# Patient Record
Sex: FEMALE | Race: WHITE | NOT HISPANIC OR LATINO | Employment: OTHER | URBAN - METROPOLITAN AREA
[De-identification: names, ages, dates, MRNs, and addresses within clinical notes are randomized per-mention and may not be internally consistent; named-entity substitution may affect disease eponyms.]

---

## 2018-02-05 ENCOUNTER — HOSPITAL ENCOUNTER (OUTPATIENT)
Dept: MAMMOGRAPHY | Facility: CLINIC | Age: 68
Discharge: HOME | End: 2018-02-05
Attending: INTERNAL MEDICINE | Admitting: INTERNAL MEDICINE
Payer: MEDICARE

## 2018-02-05 DIAGNOSIS — Z12.31 VISIT FOR SCREENING MAMMOGRAM: ICD-10-CM

## 2018-02-05 PROCEDURE — 77067 SCR MAMMO BI INCL CAD: CPT

## 2018-04-30 NOTE — PROGRESS NOTES
SUBJECTIVE:   Paloma Fitch is a 68 year old female who presents for Preventive Visit.    The patient feels well.  She has not had any asthma issues.  She needs a follow-up colonoscopy.    She has had a lot of stress recently with her 's health issues.  In the last week or so she has had a very faint discomfort in the left breast area.  No central discomfort or shortness of breath or coughs.  No fevers.  She can work out and has done so without difficulty.    The patient's blood pressure is high as noted.  However, she checks it regularly at home and it is very good then.               Past Medical History:      Past Medical History:   Diagnosis Date     Colonic polyp 2012    adenomatous, fu 5 years     Gross hematuria 2016    ct and cysto via Dr. 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               Review of Systems:   The 10 point Review of Systems is negative other than noted in the HPI           Physical Exam:   Blood pressure (!) 156/92,  "pulse 100, temperature 98.1  F (36.7  C), temperature source Oral, height 5' 8\" (1.727 m), weight 153 lb (69.4 kg), SpO2 99 %, not currently breastfeeding.    Exam:  Constitutional: healthy appearing, alert and in no distress  Heent: Normocephalic. Head without obvious masses or lesions. PERRLDC, EOMI. Mouth exam within normal limits: tongue, mucous membranes, posterior pharynx all normal, no lesions or abnormalities seen.  Tm's and canals within normal limits bilaterally. Neck supple, no nuchal rigidity or masses. No supraclavicular, or cervical adenopathy. Thyroid symmetric, no masses.  Cardiovascular: Regular rate and rhythm, no murmer, rub or gallops.  JVP not elevated, no edema.  Carotids within normal limits bilaterally, no bruits.  Respiratory: Normal respiratory effort.  Lungs clear, normal flow, no wheezing or crackles.  Breasts: Normal bilaterally.  No masses or lesions.  Nipples within normal limits.  No axillary lesions or nodes.  My M.A. Was present during this part of the examination.  Gastrointestinal: Normal active bowel sounds.   Soft, not tender, no masses, guarding or rebound.  No hepatosplenomegaly.   Musculoskeletal: extremities normal, no gross deformities noted.  Skin: no suspicious lesions or rashes   Neurologic: Mental status within normal limits.  Speech fluent.  No gross motor abnormalities and gait intact.  Psychiatric: mentation appears normal and affect normal.         Data:   Labs sent        Assessment:   1. Normal complete physical exam  2. Colon polyp, follow up ordered  3. Asthma, no issues  4. Left breast discomfort, suspect msk, doubt cv, tumor, pulmonary embolis.  If worsens or not gone soon to call  5. hcm         Plan:   shingrx at pharmacy  Colonoscopy  Letter with labs  Exercise  Call if problems      Jonn Downing M.D.              Are you in the first 12 months of your Medicare Part B coverage?  No    Healthy Habits:    Do you get at least three servings of calcium " containing foods daily (dairy, green leafy vegetables, etc.)? yes    Amount of exercise or daily activities, outside of work: 6 day(s) per week    Problems taking medications regularly No    Medication side effects: No    Have you had an eye exam in the past two years? yes    Do you see a dentist twice per year? yes    Do you have sleep apnea, excessive snoring or daytime drowsiness?no      Ability to successfully perform activities of daily living: Yes, no assistance needed    Home safety:  none identified     Hearing impairment: No    Fall risk:           COGNITIVE SCREEN  1) Repeat 3 items (Banana, Sunrise, Chair)    2) Clock draw:   3) 3 item recall:   Results: 3 items recalled: COGNITIVE IMPAIRMENT LESS LIKELY    Mini-CogTM Copyright S Mely. Licensed by the author for use in [facility]; reprinted with permission ([email or web address]). All rights reserved.                Reviewed and updated as needed this visit by clinical staff         Reviewed and updated as needed this visit by Provider        Social History   Substance Use Topics     Smoking status: Never Smoker     Smokeless tobacco: Never Used     Alcohol use 0.0 oz/week     0 Standard drinks or equivalent per week      Comment: occ       If you drink alcohol do you typically have >3 drinks per day or >7 drinks per week? No                        Today's PHQ-2 Score:   PHQ-2 ( 1999 Pfizer) 10/13/2016 2/11/2013   Q1: Little interest or pleasure in doing things 0 0   Q2: Feeling down, depressed or hopeless 0 0   PHQ-2 Score 0 0       Do you feel safe in your environment - Yes    Do you have a Health Care Directive?: Yes: Patient states has Advance Directive and will bring in a copy to clinic.    Current providers sharing in care for this patient include:   Patient Care Team:  Jonn Downing MD as PCP - General (Internal Medicine)    The following health maintenance items are reviewed in Epic and correct as of today:  Health Maintenance  "  Topic Date Due     HEPATITIS C SCREENING  01/26/1967     LIPID SCREEN Q5 YR FEMALE (SYSTEM ASSIGNED)  01/26/1994     ADVANCE DIRECTIVE PLANNING Q5 YRS  01/26/2004     FALL RISK ASSESSMENT  01/26/2014     DEXA SCAN SCREENING (SYSTEM ASSIGNED)  01/26/2014     PNEUMOCOCCAL (1 of 2 - PCV13) 01/26/2014     ASTHMA ACTION PLAN Q1 YR  02/11/2014     ASTHMA CONTROL TEST Q6 MOS  04/13/2017     INFLUENZA VACCINE (1) 09/01/2017     TETANUS IMMUNIZATION (SYSTEM ASSIGNED)  11/01/2017     MAMMO SCREEN Q2 YR (SYSTEM ASSIGNED)  02/05/2020     COLON CANCER SCREEN (SYSTEM ASSIGNED)  11/29/2022       End of Life Planning:  Patient currently has an advanced directive: no, I rec it    COUNSELING:  Reviewed preventive health counseling, as reflected in patient instructions       Regular exercise       Healthy diet/nutrition        Estimated body mass index is 24.12 kg/(m^2) as calculated from the following:    Height as of 10/14/16: 5' 8\" (1.727 m).    Weight as of 10/14/16: 158 lb 9.6 oz (71.9 kg).       reports that she has never smoked. She has never used smokeless tobacco.      Appropriate preventive services were discussed with this patient, including applicable screening as appropriate for cardiovascular disease, diabetes, osteopenia/osteoporosis, and glaucoma.  As appropriate for age/gender, discussed screening for colorectal cancer, prostate cancer, breast cancer, and cervical cancer. Checklist reviewing preventive services available has been given to the patient.    Reviewed patients plan of care and provided an AVS. The Basic Care Plan (routine screening as documented in Health Maintenance) for Paloma meets the Care Plan requirement. This Care Plan has been established and reviewed with the Patient.    Counseling Resources:  ATP IV Guidelines  Pooled Cohorts Equation Calculator  Breast Cancer Risk Calculator  FRAX Risk Assessment  ICSI Preventive Guidelines  Dietary Guidelines for Americans, 2010  USDA's MyPlate  ASA " Prophylaxis  Lung CA Screening    Jonn Downing MD  [facility]

## 2018-05-01 ENCOUNTER — OFFICE VISIT (OUTPATIENT)
Dept: FAMILY MEDICINE | Facility: CLINIC | Age: 68
End: 2018-05-01
Payer: COMMERCIAL

## 2018-05-01 VITALS
HEART RATE: 100 BPM | BODY MASS INDEX: 23.19 KG/M2 | DIASTOLIC BLOOD PRESSURE: 92 MMHG | HEIGHT: 68 IN | TEMPERATURE: 98.1 F | OXYGEN SATURATION: 99 % | SYSTOLIC BLOOD PRESSURE: 156 MMHG | WEIGHT: 153 LBS

## 2018-05-01 DIAGNOSIS — K63.5 POLYP OF COLON, UNSPECIFIED PART OF COLON, UNSPECIFIED TYPE: ICD-10-CM

## 2018-05-01 DIAGNOSIS — J45.20 INTERMITTENT ASTHMA, UNCOMPLICATED: ICD-10-CM

## 2018-05-01 DIAGNOSIS — Z00.00 ROUTINE GENERAL MEDICAL EXAMINATION AT A HEALTH CARE FACILITY: Primary | ICD-10-CM

## 2018-05-01 LAB
ALBUMIN SERPL-MCNC: 3.8 G/DL (ref 3.4–5)
ALP SERPL-CCNC: 47 U/L (ref 40–150)
ALT SERPL W P-5'-P-CCNC: 22 U/L (ref 0–50)
ANION GAP SERPL CALCULATED.3IONS-SCNC: 8 MMOL/L (ref 3–14)
AST SERPL W P-5'-P-CCNC: 17 U/L (ref 0–45)
BILIRUB SERPL-MCNC: 0.7 MG/DL (ref 0.2–1.3)
BUN SERPL-MCNC: 14 MG/DL (ref 7–30)
CALCIUM SERPL-MCNC: 9 MG/DL (ref 8.5–10.1)
CHLORIDE SERPL-SCNC: 104 MMOL/L (ref 94–109)
CHOLEST SERPL-MCNC: 230 MG/DL
CO2 SERPL-SCNC: 27 MMOL/L (ref 20–32)
CREAT SERPL-MCNC: 0.86 MG/DL (ref 0.52–1.04)
ERYTHROCYTE [DISTWIDTH] IN BLOOD BY AUTOMATED COUNT: 13.3 % (ref 10–15)
GFR SERPL CREATININE-BSD FRML MDRD: 65 ML/MIN/1.7M2
GLUCOSE SERPL-MCNC: 98 MG/DL (ref 70–99)
HCT VFR BLD AUTO: 41.2 % (ref 35–47)
HCV AB SERPL QL IA: NONREACTIVE
HDLC SERPL-MCNC: 93 MG/DL
HGB BLD-MCNC: 13.6 G/DL (ref 11.7–15.7)
LDLC SERPL CALC-MCNC: 126 MG/DL
MCH RBC QN AUTO: 31 PG (ref 26.5–33)
MCHC RBC AUTO-ENTMCNC: 33 G/DL (ref 31.5–36.5)
MCV RBC AUTO: 94 FL (ref 78–100)
NONHDLC SERPL-MCNC: 137 MG/DL
PLATELET # BLD AUTO: 340 10E9/L (ref 150–450)
POTASSIUM SERPL-SCNC: 4.2 MMOL/L (ref 3.4–5.3)
PROT SERPL-MCNC: 7.7 G/DL (ref 6.8–8.8)
RBC # BLD AUTO: 4.39 10E12/L (ref 3.8–5.2)
SODIUM SERPL-SCNC: 139 MMOL/L (ref 133–144)
TRIGL SERPL-MCNC: 57 MG/DL
WBC # BLD AUTO: 7.1 10E9/L (ref 4–11)

## 2018-05-01 PROCEDURE — 86803 HEPATITIS C AB TEST: CPT | Performed by: INTERNAL MEDICINE

## 2018-05-01 PROCEDURE — 80053 COMPREHEN METABOLIC PANEL: CPT | Performed by: INTERNAL MEDICINE

## 2018-05-01 PROCEDURE — G0438 PPPS, INITIAL VISIT: HCPCS | Performed by: INTERNAL MEDICINE

## 2018-05-01 PROCEDURE — 36415 COLL VENOUS BLD VENIPUNCTURE: CPT | Performed by: INTERNAL MEDICINE

## 2018-05-01 PROCEDURE — 85027 COMPLETE CBC AUTOMATED: CPT | Performed by: INTERNAL MEDICINE

## 2018-05-01 PROCEDURE — 80061 LIPID PANEL: CPT | Performed by: INTERNAL MEDICINE

## 2018-05-01 NOTE — MR AVS SNAPSHOT
After Visit Summary   5/1/2018    Paloma Fitch    MRN: [id number]           Patient Information     Date Of Birth          1950        Visit Information        Provider Department      5/1/2018 10:00 AM Jonn Downing MD [facility]        Today's Diagnoses     Routine general medical examination at a health care facility    -  1    Polyp of colon, unspecified part of colon, unspecified type        Intermittent asthma, uncomplicated          Care Instructions      Preventive Health Recommendations    Female Ages 65 +    Yearly exam:     See your health care provider every year in order to  o Review health changes.   o Discuss preventive care.    o Review your medicines if your doctor has prescribed any.      You no longer need a yearly Pap test unless you've had an abnormal Pap test in the past 10 years. If you have vaginal symptoms, such as bleeding or discharge, be sure to talk with your provider about a Pap test.      Every 1 to 2 years, have a mammogram.  If you are over 69, talk with your health care provider about whether or not you want to continue having screening mammograms.      Every 10 years, have a colonoscopy. Or, have a yearly FIT test (stool test). These exams will check for colon cancer.       Have a cholesterol test every 5 years, or more often if your doctor advises it.       Have a diabetes test (fasting glucose) every three years. If you are at risk for diabetes, you should have this test more often.       At age 65, have a bone density scan (DEXA) to check for osteoporosis (brittle bone disease).    Shots:    Get a flu shot each year.    Get a tetanus shot every 10 years.    Talk to your doctor about your pneumonia vaccines. There are now two you should receive - Pneumovax (PPSV 23) and Prevnar (PCV 13).    Talk to your doctor about the shingles vaccine.    Talk to your doctor about the hepatitis B vaccine.    Nutrition:     Eat at least 5 servings  "of fruits and vegetables each day.      Eat whole-grain bread, whole-wheat pasta and brown rice instead of white grains and rice.      Talk to your provider about Calcium and Vitamin D.     Lifestyle    Exercise at least 150 minutes a week (30 minutes a day, 5 days a week). This will help you control your weight and prevent disease.      Limit alcohol to one drink per day.      No smoking.       Wear sunscreen to prevent skin cancer.       See your dentist twice a year for an exam and cleaning.      See your eye doctor every 1 to 2 years to screen for conditions such as glaucoma, macular degeneration and cataracts.          Follow-ups after your visit        Additional Services     GASTROENTEROLOGY ADULT REF PROCEDURE ONLY Tino Fernandez [phone]; Dr. AMISH Carreon                 Who to contact     If you have questions or need follow up information about today's clinic visit or your schedule please contact [facility] directly at [phone].  Normal or non-critical lab and imaging results will be communicated to you by [facility], letter or phone within 4 business days after the clinic has received the results. If you do not hear from us within 7 days, please contact the clinic through [facility] or phone. If you have a critical or abnormal lab result, we will notify you by phone as soon as possible.  Submit refill requests through [employer] or call your pharmacy and they will forward the refill request to us. Please allow 3 business days for your refill to be completed.          Additional Information About Your Visit        [employer] Information     [employer] lets you send messages to your doctor, view your test results, renew your prescriptions, schedule appointments and more. To sign up, go to [email or web address] . Click on \"Log in\" on the left side of the screen, which will take you to the Welcome page. Then click on \"Sign up Now\" on the right side of the page.     You will be asked to enter the " "access code listed below, as well as some personal information. Please follow the directions to create your username and password.     Your access code is: [email or web address]  Expires: 2018 10:25 AM     Your access code will  in 90 days. If you need help or a new code, please call your [facility] clinic or [phone].        Care EveryWhere ID     This is your Care EveryWhere ID. This could be used by other organizations to access your [facility] medical records  [id number]        Your Vitals Were     Pulse Temperature Height Pulse Oximetry Breastfeeding? BMI (Body Mass Index)    100 98.1  F (36.7  C) (Oral) 5' 8\" (1.727 m) 99% No 23.26 kg/m2       Blood Pressure from Last 3 Encounters:   18 (!) 156/92   10/14/16 168/85   10/13/16 (!) 161/96    Weight from Last 3 Encounters:   18 153 lb (69.4 kg)   10/14/16 158 lb 9.6 oz (71.9 kg)   10/13/16 160 lb (72.6 kg)              We Performed the Following     CBC with platelets     Comprehensive metabolic panel     GASTROENTEROLOGY ADULT REF PROCEDURE ONLY Tino Fernandez [phone]; Dr. AMISH Carreon     Hepatitis C antibody     Lipid panel reflex to direct LDL Non-fasting          Today's Medication Changes          These changes are accurate as of 18 10:25 AM.  If you have any questions, ask your nurse or doctor.               Stop taking these medicines if you haven't already. Please contact your care team if you have questions.     sulfamethoxazole-trimethoprim 800-160 MG per tablet   Commonly known as:  BACTRIM DS/SEPTRA DS   Stopped by:  Jonn Downing MD                    Primary Care Provider Office Phone # Fax #    Jonn Downing [phone] [address] [address]        Equal Access to Services     [facility]: Hadii aad ku hadasho Somaria e, waaxda luqadaha, qaybta kaalmada estefany, oliva wills ah. [facility] [phone].    ATENCIÓN: Si makenzie garay, " tiene a morin disposición servicios gratuitos de asistencia lingüística. Karishma crum [phone].    We comply with applicable federal civil rights laws and [place] laws. We do not discriminate on the basis of race, color, national origin, age, disability, sex, sexual orientation, or gender identity.            Thank you!     Thank you for choosing [facility]  for your care. Our goal is always to provide you with excellent care. Hearing back from our patients is one way we can continue to improve our services. Please take a few minutes to complete the written survey that you may receive in the mail after your visit with us. Thank you!             Your Updated Medication List - Protect others around you: Learn how to safely use, store and throw away your medicines at www.disposemymeds.org.      Notice  As of 5/1/2018 10:25 AM    You have not been prescribed any medications.

## 2018-05-01 NOTE — NURSING NOTE
"Chief Complaint   Patient presents with     Medicare Visit       Initial BP (!) 156/92  Pulse 100  Temp 98.1  F (36.7  C) (Oral)  Ht 5' 8\" (1.727 m)  Wt 153 lb (69.4 kg)  SpO2 99%  Breastfeeding? No  BMI 23.26 kg/m2 Estimated body mass index is 23.26 kg/(m^2) as calculated from the following:    Height as of this encounter: 5' 8\" (1.727 m).    Weight as of this encounter: 153 lb (69.4 kg).  Medication Reconciliation: complete   Arlet Barrera CMA    .     "

## 2018-09-20 ENCOUNTER — HOSPITAL ENCOUNTER (OUTPATIENT)
Facility: CLINIC | Age: 68
Discharge: HOME | End: 2018-09-20
Attending: INTERNAL MEDICINE | Admitting: INTERNAL MEDICINE
Payer: MEDICARE

## 2018-09-20 ENCOUNTER — TRANSFERRED RECORDS (OUTPATIENT)
Dept: HEALTH INFORMATION MANAGEMENT | Facility: CLINIC | Age: 68
End: 2018-09-20

## 2018-09-20 VITALS
WEIGHT: 155 LBS | OXYGEN SATURATION: 99 % | HEIGHT: 68 IN | SYSTOLIC BLOOD PRESSURE: 130 MMHG | BODY MASS INDEX: 23.49 KG/M2 | RESPIRATION RATE: 28 BRPM | DIASTOLIC BLOOD PRESSURE: 73 MMHG

## 2018-09-20 LAB — COLONOSCOPY: NORMAL

## 2018-09-20 PROCEDURE — 88305 TISSUE EXAM BY PATHOLOGIST: CPT | Performed by: INTERNAL MEDICINE

## 2018-09-20 PROCEDURE — 88305 TISSUE EXAM BY PATHOLOGIST: CPT | Mod: 26 | Performed by: INTERNAL MEDICINE

## 2018-09-20 PROCEDURE — 25000128 H RX IP 250 OP 636: Performed by: INTERNAL MEDICINE

## 2018-09-20 PROCEDURE — 45380 COLONOSCOPY AND BIOPSY: CPT | Performed by: INTERNAL MEDICINE

## 2018-09-20 PROCEDURE — G0500 MOD SEDAT ENDO SERVICE >5YRS: HCPCS | Performed by: INTERNAL MEDICINE

## 2018-09-20 RX ORDER — LIDOCAINE 40 MG/G
CREAM TOPICAL
Status: DISCONTINUED | OUTPATIENT
Start: 2018-09-20 | End: 2018-09-20 | Stop reason: HOSPADM

## 2018-09-20 RX ORDER — ONDANSETRON 2 MG/ML
4 INJECTION INTRAMUSCULAR; INTRAVENOUS
Status: DISCONTINUED | OUTPATIENT
Start: 2018-09-20 | End: 2018-09-20 | Stop reason: HOSPADM

## 2018-09-20 RX ORDER — FENTANYL CITRATE 50 UG/ML
INJECTION, SOLUTION INTRAMUSCULAR; INTRAVENOUS PRN
Status: DISCONTINUED | OUTPATIENT
Start: 2018-09-20 | End: 2018-09-20 | Stop reason: HOSPADM

## 2018-09-21 LAB — COPATH REPORT: NORMAL

## 2018-11-29 ENCOUNTER — TELEPHONE (OUTPATIENT)
Dept: FAMILY MEDICINE | Facility: CLINIC | Age: 68
End: 2018-11-29

## 2018-11-29 NOTE — TELEPHONE ENCOUNTER
Reason for Call:  Other appointment    Detailed comments: patient is having some issues with arrhythmia and would like to get in to see Dr. Downing sometime soon if possible.    Please call patient to let her know if she can be seen.    Phone Number Patient can be reached at: Home number on file [phone] (home)    Best Time: anytime    Can we leave a detailed message on this number? YES    Call taken on 11/29/2018 at 2:34 PM by Kristy Good  .

## 2018-11-30 ENCOUNTER — OFFICE VISIT (OUTPATIENT)
Dept: FAMILY MEDICINE | Facility: CLINIC | Age: 68
End: 2018-11-30
Payer: COMMERCIAL

## 2018-11-30 VITALS
DIASTOLIC BLOOD PRESSURE: 74 MMHG | HEART RATE: 113 BPM | SYSTOLIC BLOOD PRESSURE: 156 MMHG | TEMPERATURE: 97 F | OXYGEN SATURATION: 100 % | WEIGHT: 157 LBS | BODY MASS INDEX: 23.87 KG/M2

## 2018-11-30 DIAGNOSIS — R00.2 PALPITATIONS: Primary | ICD-10-CM

## 2018-11-30 DIAGNOSIS — Z91.030 BEE STING ALLERGY: ICD-10-CM

## 2018-11-30 DIAGNOSIS — I47.10 SVT (SUPRAVENTRICULAR TACHYCARDIA) (H): ICD-10-CM

## 2018-11-30 PROCEDURE — 93010 ELECTROCARDIOGRAM REPORT: CPT | Performed by: INTERNAL MEDICINE

## 2018-11-30 PROCEDURE — 80048 BASIC METABOLIC PNL TOTAL CA: CPT | Performed by: INTERNAL MEDICINE

## 2018-11-30 PROCEDURE — 36415 COLL VENOUS BLD VENIPUNCTURE: CPT | Performed by: INTERNAL MEDICINE

## 2018-11-30 PROCEDURE — 99214 OFFICE O/P EST MOD 30 MIN: CPT | Performed by: INTERNAL MEDICINE

## 2018-11-30 PROCEDURE — 84443 ASSAY THYROID STIM HORMONE: CPT | Performed by: INTERNAL MEDICINE

## 2018-11-30 RX ORDER — EPINEPHRINE 0.3 MG/.3ML
0.3 INJECTION SUBCUTANEOUS PRN
Qty: 0.6 ML | Refills: 1 | Status: SHIPPED | OUTPATIENT
Start: 2018-11-30 | End: 2021-01-29

## 2018-11-30 NOTE — MR AVS SNAPSHOT
After Visit Summary   11/30/2018    Paloma Fitch    MRN: [id number]           Patient Information     Date Of Birth          1950        Visit Information        Provider Department      11/30/2018 1:00 PM Jonn Downing MD [facility]        Today's Diagnoses     Palpitations    -  1    SVT (supraventricular tachycardia) (H)        Bee sting allergy           Follow-ups after your visit        Future tests that were ordered for you today     Open Future Orders        Priority Expected Expires Ordered    Echocardiogram Routine  11/30/2019 11/30/2018            Who to contact     If you have questions or need follow up information about today's clinic visit or your schedule please contact [facility] directly at [phone].  Normal or non-critical lab and imaging results will be communicated to you by [employer], letter or phone within 4 business days after the clinic has received the results. If you do not hear from us within 7 days, please contact the clinic through [employer] or phone. If you have a critical or abnormal lab result, we will notify you by phone as soon as possible.  Submit refill requests through [employer] or call your pharmacy and they will forward the refill request to us. Please allow 3 business days for your refill to be completed.          Additional Information About Your Visit        MyChart Information     [employer] gives you secure access to your electronic health record. If you see a primary care provider, you can also send messages to your care team and make appointments. If you have questions, please call your primary care clinic.  If you do not have a primary care provider, please call [phone] and they will assist you.        Care EveryWhere ID     This is your Care EveryWhere ID. This could be used by other organizations to access your [place] medical records  [id number]        Your Vitals Were     Pulse Temperature Pulse  Oximetry BMI (Body Mass Index)          113 97  F (36.1  C) (Oral) 100% 23.87 kg/m2         Blood Pressure from Last 3 Encounters:   11/30/18 156/74   09/20/18 130/73   05/01/18 (!) 156/92    Weight from Last 3 Encounters:   11/30/18 157 lb (71.2 kg)   09/20/18 155 lb (70.3 kg)   05/01/18 153 lb (69.4 kg)              We Performed the Following     Basic metabolic panel     EKG 12-LEAD CLINIC READ     TSH with free T4 reflex          Today's Medication Changes          These changes are accurate as of 11/30/18  1:32 PM.  If you have any questions, ask your nurse or doctor.               Start taking these medicines.        Dose/Directions    EPINEPHrine 0.3 MG/0.3ML injection 2-pack   Commonly known as:  EPIPEN/ADRENACLICK/or ANY BX GENERIC EQUIV   Used for:  Bee sting allergy   Started by:  Jonn Downing MD        Dose:  0.3 mg   Inject 0.3 mLs (0.3 mg) into the muscle as needed for anaphylaxis   Quantity:  0.6 mL   Refills:  1            Where to get your medicines      Some of these will need a paper prescription and others can be bought over the counter.  Ask your nurse if you have questions.     Bring a paper prescription for each of these medications     EPINEPHrine 0.3 MG/0.3ML injection 2-pack                Primary Care Provider Office Phone # Fax #    Jonn Downing [phone] [address]  [place] [address]        Equal Access to Services     [facility]: Hadii aad ku hadasho Soomaali, waaxda luqadaha, qaybta kaalmada adeegyada, waxay idiin haynasrin wills . So [facility] [phone].    ATENCIÓN: Si habla español, tiene a morin disposición servicios gratuitos de asistencia lingüística. Llame al [phone].    We comply with applicable federal civil rights laws and [place] laws. We do not discriminate on the basis of race, color, national origin, age, disability, sex, sexual orientation, or gender identity.            Thank you!     Thank you for  choosing [facility]  for your care. Our goal is always to provide you with excellent care. Hearing back from our patients is one way we can continue to improve our services. Please take a few minutes to complete the written survey that you may receive in the mail after your visit with us. Thank you!             Your Updated Medication List - Protect others around you: Learn how to safely use, store and throw away your medicines at www.disposemymeds.org.          This list is accurate as of 11/30/18  1:32 PM.  Always use your most recent med list.                   Brand Name Dispense Instructions for use Diagnosis    EPINEPHrine 0.3 MG/0.3ML injection 2-pack    EPIPEN/ADRENACLICK/or ANY BX GENERIC EQUIV    0.6 mL    Inject 0.3 mLs (0.3 mg) into the muscle as needed for anaphylaxis    Bee sting allergy

## 2018-11-30 NOTE — PROGRESS NOTES
The patient is here as an urgent work in for palpitations.  She has had them before but have increased in the last 6 weeks.  She also has had more stress.  Caffeine is minimal and no history of thyroid issues.  She is not having tremors or diarrhea.  No fevers, night sweats or weight loss.  No drug use.  Alcohol is 1 a day.  She does not smoke.    The patient notes palpitations that come and go.  They are not fast and seemed to just be one beat at a time.  She can do a treadmill for 45 minutes and have no problems there and it does not make the palpitations worse.  She has not been having chest pain, shortness of breath, PND.  No fevers or night sweats.  No GI symptoms.  She is otherwise been feeling fairly well.  She wants up EpiPen due to her history of bee sting allergy for her upcoming trip to [place].    Past Medical History:   Diagnosis Date     Colonic polyp 2012    adenomatous, fu 5 years; fu 9/18 one hyper polyp     Gross hematuria 2016    ct and cysto via Dr. Watt     Intermittent asthma      SVT (supraventricular tachycardia) (H) 1994    successful catheter ablation done u of m     Past Surgical History:   Procedure Laterality Date     CARDIAC SURGERY      ablation for SVT/WPW     COLONOSCOPY N/A 9/20/2018    Procedure: COMBINED COLONOSCOPY, SINGLE OR MULTIPLE BIOPSY/POLYPECTOMY BY BIOPSY;  colonoscopy;  Surgeon: Tre Carreon MD;  Location:  GI     ENT SURGERY      wisdom teeth, mandibular mass excision     wpw ablation surgery  41     Social History     Social History     Marital status:      Spouse name: N/A     Number of children: 2     Years of education: N/A     Occupational History     physical therapist, 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[email or web address]]     Social History Main Topics     Smoking status: Never Smoker     Smokeless tobacco: Never Used     Alcohol use 4.2 oz/week     7 Standard drinks or equivalent per week      Comment: occ     Drug use: No     Sexual activity: Yes     Partners:  Male     Other Topics Concern     Not on file     Social History Narrative     Current Outpatient Prescriptions   Medication Sig Dispense Refill     EPINEPHrine (EPIPEN/ADRENACLICK/OR ANY BX GENERIC EQUIV) 0.3 MG/0.3ML injection 2-pack Inject 0.3 mLs (0.3 mg) into the muscle as needed for anaphylaxis 0.6 mL 1     Allergies   Allergen Reactions     Iodine-131      Shellfish Allergy Hives     FAMILY HISTORY NOTED AND REVIEWED    REVIEW OF SYSTEMS: above    PHYSICAL EXAM    /74  Pulse 113  Temp 97  F (36.1  C) (Oral)  Wt 157 lb (71.2 kg)  SpO2 100%  BMI 23.87 kg/m2    Patient appears non toxic  Mouth - tongue midline and within normal limits, mucous membranes and posterior pharynx within normal limits, no lesions seen.  Neck - no masses, lesions or tenderness  Nodes - no supraclavicular, cervical or axially adenopathy .  Lungs - clear, normal flow  Cardiovascular - regular rate and rhythm, no murmer, rub or gallop, no jvp or edema, carotids within normal limits, no bruits.  Abdomen - normal active bowel sounds, soft, non tender, no masses, guarding or rebound, no hepatosplenomegaly      Labs ekg - nsr, rbbb, otherwise within normal limits, labs sent    ASSESSMENT:  1. Palp, suspect pac's or pavc's, none now, exam normal, doubt thyroid ischemia, tumor, pheo, etc, doubt malig rhythm or afib  2. Prior svt, ablated, doubt this  3. Bee sting allergy    PLAN:  Labs  Echo  If persist or worsens call and do ziopatch  Epi pen    Jonn Downing M.D.        Jonn Downing M.D.

## 2018-12-01 LAB
ANION GAP SERPL CALCULATED.3IONS-SCNC: 7 MMOL/L (ref 3–14)
BUN SERPL-MCNC: 17 MG/DL (ref 7–30)
CALCIUM SERPL-MCNC: 9 MG/DL (ref 8.5–10.1)
CHLORIDE SERPL-SCNC: 102 MMOL/L (ref 94–109)
CO2 SERPL-SCNC: 26 MMOL/L (ref 20–32)
CREAT SERPL-MCNC: 0.83 MG/DL (ref 0.52–1.04)
GFR SERPL CREATININE-BSD FRML MDRD: 68 ML/MIN/1.7M2
GLUCOSE SERPL-MCNC: 108 MG/DL (ref 70–99)
POTASSIUM SERPL-SCNC: 3.6 MMOL/L (ref 3.4–5.3)
SODIUM SERPL-SCNC: 135 MMOL/L (ref 133–144)
TSH SERPL DL<=0.005 MIU/L-ACNC: 2.95 MU/L (ref 0.4–4)

## 2018-12-01 NOTE — PROGRESS NOTES
It was a please seeing you.  You should be able to view your labs.    Your labs are normal.  Please get the echo test when you can.    Jonn Downing M.D.

## 2018-12-10 ENCOUNTER — HOSPITAL ENCOUNTER (OUTPATIENT)
Dept: CARDIOLOGY | Facility: CLINIC | Age: 68
Discharge: HOME | End: 2018-12-10
Attending: INTERNAL MEDICINE | Admitting: INTERNAL MEDICINE
Payer: MEDICARE

## 2018-12-10 DIAGNOSIS — R00.2 PALPITATIONS: ICD-10-CM

## 2018-12-10 PROCEDURE — 93306 TTE W/DOPPLER COMPLETE: CPT | Mod: 26 | Performed by: INTERNAL MEDICINE

## 2018-12-10 PROCEDURE — 93306 TTE W/DOPPLER COMPLETE: CPT

## 2018-12-11 NOTE — RESULT ENCOUNTER NOTE
Great news, your echo looks super.  Let me know if your palpitations persist.    Jonn Downing M.D.

## 2018-12-15 ENCOUNTER — TELEPHONE (OUTPATIENT)
Dept: FAMILY MEDICINE | Facility: CLINIC | Age: 68
End: 2018-12-15

## 2018-12-15 NOTE — TELEPHONE ENCOUNTER
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

## 2018-12-18 NOTE — TELEPHONE ENCOUNTER
Central Prior Authorization Team   Phone: [phone]      PA Initiation    Medication: Epinephrine 0.3 mg-Initiated  Insurance Company: Other (see comments)Comment:  [place] [phone]  Pharmacy Filling the Rx: [facility] [id number] [place] [address] [address] [place] [facility] [place] [facility] [address]  Filling Pharmacy Phone: [phone]  Filling Pharmacy Fax:    Start Date: 12/18/2018

## 2018-12-18 NOTE — TELEPHONE ENCOUNTER
Prior Authorization Approval    Authorization Effective Date:    Authorization Expiration Date:    Medication: Epinephrine 0.3 mg-APPROVED  Approved Dose/Quantity:   Reference #:     Insurance Company: Other (see comments)Comment:  Close [phone]  Expected CoPay:       CoPay Card Available:      Foundation Assistance Needed:    Which Pharmacy is filling the prescription (Not needed for infusion/clinic administered): [facility] [address] [place] [address] [address] [place] [facility] [address] [facility]  Pharmacy Notified: Yes  Patient Notified: No    PA is not needed since the generic is on formulary.  Called pharmacy and they did not need a PA. Patient still has a deductible to meet, and the copay is high.

## 2019-02-26 ENCOUNTER — TRANSFERRED RECORDS (OUTPATIENT)
Dept: HEALTH INFORMATION MANAGEMENT | Facility: CLINIC | Age: 69
End: 2019-02-26

## 2019-04-02 ENCOUNTER — HOSPITAL ENCOUNTER (OUTPATIENT)
Dept: MAMMOGRAPHY | Facility: CLINIC | Age: 69
Discharge: HOME | End: 2019-04-02
Attending: INTERNAL MEDICINE | Admitting: INTERNAL MEDICINE
Payer: MEDICARE

## 2019-04-02 DIAGNOSIS — Z12.31 VISIT FOR SCREENING MAMMOGRAM: ICD-10-CM

## 2019-04-02 PROCEDURE — 77063 BREAST TOMOSYNTHESIS BI: CPT

## 2019-09-28 ENCOUNTER — OFFICE VISIT (OUTPATIENT)
Dept: URGENT CARE | Facility: URGENT CARE | Age: 69
End: 2019-09-28
Payer: MEDICARE

## 2019-09-28 ENCOUNTER — NURSE TRIAGE (OUTPATIENT)
Dept: NURSING | Facility: CLINIC | Age: 69
End: 2019-09-28

## 2019-09-28 VITALS
DIASTOLIC BLOOD PRESSURE: 84 MMHG | OXYGEN SATURATION: 100 % | SYSTOLIC BLOOD PRESSURE: 174 MMHG | BODY MASS INDEX: 23.26 KG/M2 | TEMPERATURE: 97.6 F | HEART RATE: 111 BPM | WEIGHT: 153 LBS

## 2019-09-28 DIAGNOSIS — R35.0 URINE FREQUENCY: Primary | ICD-10-CM

## 2019-09-28 DIAGNOSIS — N30.01 ACUTE CYSTITIS WITH HEMATURIA: ICD-10-CM

## 2019-09-28 LAB
RBC #/AREA URNS AUTO: >100 /HPF
WBC #/AREA URNS AUTO: ABNORMAL /HPF

## 2019-09-28 PROCEDURE — 87086 URINE CULTURE/COLONY COUNT: CPT | Performed by: NURSE PRACTITIONER

## 2019-09-28 PROCEDURE — 99213 OFFICE O/P EST LOW 20 MIN: CPT

## 2019-09-28 PROCEDURE — 81015 MICROSCOPIC EXAM OF URINE: CPT | Performed by: NURSE PRACTITIONER

## 2019-09-28 RX ORDER — SULFAMETHOXAZOLE/TRIMETHOPRIM 800-160 MG
1 TABLET ORAL 2 TIMES DAILY
Qty: 14 TABLET | Refills: 0 | Status: SHIPPED | OUTPATIENT
Start: 2019-09-28 | End: 2019-10-07

## 2019-09-28 ASSESSMENT — ENCOUNTER SYMPTOMS
HEMATURIA: 1
VOMITING: 0
CHILLS: 0
DYSURIA: 1
FEVER: 0
CARDIOVASCULAR NEGATIVE: 1
ABDOMINAL PAIN: 0
FREQUENCY: 1
NAUSEA: 0
RESPIRATORY NEGATIVE: 1
FLANK PAIN: 0

## 2019-09-28 NOTE — TELEPHONE ENCOUNTER
FNA triage call :   Presenting problem :  Gross hematuria and urgency and frequent started today at 230am .  3 years ago had UTI and had  CT and followed by  Dr REYES  [facility] . Currently : no fever or injury or vomiting or flank or back pain .   Guideline used : urine - blood  in A  .   Disposition and recommendations : see provider today and will go into [facility] now .   Caller verbalizes understanding and denies further questions and will call back if further symptoms to triage or questions  . Liz Marvin RN  - [facility] Nurse Advisor     Reason for Disposition    Blood in urine  (Exception: could be normal menstrual bleeding)    Additional Information    Negative: Shock suspected (e.g., cold/pale/clammy skin, too weak to stand, low BP, rapid pulse)    Negative: Sounds like a life-threatening emergency to the triager    Negative: Urinary catheter, questions about    Negative: Recent back or abdominal injury    Negative: Recent genital injury    Negative: [1] Unable to urinate (or only a few drops) > 4 hours AND [2] bladder feels very full (e.g., palpable bladder or strong urge to urinate)    Negative: Passing pure blood or large blood clots (i.e., size > a dime) (Exception: fernanda or small strands)    Negative: Fever > 100.5 F (38.1 C)    Negative: Patient sounds very sick or weak to the triager    Negative: Known sickle cell disease    Negative: Taking Coumadin (warfarin) or other strong blood thinner, or known bleeding disorder (e.g., thrombocytopenia)    Negative: Side (flank) or back pain present    Negative: Pain or burning with passing urine    Negative: [1] Pink or red-colored urine and likely from food (beets, rhubarb, red food dye) AND [2] lasts > 24 hours after stopping food    Protocols used: URINE - BLOOD IN-A-

## 2019-09-28 NOTE — PATIENT INSTRUCTIONS
"  Patient Education     Bladder Infection, Female (Adult)    Urine is normally doesn't have any bacteria in it. But bacteria can get into the urinary tract from the skin around the rectum. Or they can travel in the blood from elsewhere in the body. Once they are in your urinary tract, they can cause infection in the urethra (urethritis), the bladder (cystitis), or the kidneys (pyelonephritis).  The most common place for an infection is in the bladder. This is called a bladder infection. This is one of the most common infections in women. Most bladder infections are easily treated. They are not serious unless the infection spreads to the kidney.  The phrases \"bladder infection,\" \"UTI,\" and \"cystitis\" are often used to describe the same thing. But they are not always the same. Cystitis is an inflammation of the bladder. The most common cause of cystitis is an infection.  Symptoms  The infection causes inflammation in the urethra and bladder. This causes many of the symptoms. The most common symptoms of a bladder infection are:    Pain or burning when urinating    Having to urinate more often than usual    Urgent need to urinate    Only a small amount of urine comes out    Blood in urine    Abdominal discomfort. This is usually in the lower abdomen above the pubic bone.    Cloudy urine    Strong- or bad-smelling urine    Unable to urinate (urinary retention)    Unable to hold urine in (urinary incontinence)    Fever    Loss of appetite    Confusion (in older adults)  Causes  Bladder infections are not contagious. You can't get one from someone else, from a toilet seat, or from sharing a bath.  The most common cause of bladder infections is bacteria from the bowels. The bacteria get onto the skin around the opening of the urethra. From there, they can get into the urine and travel up to the bladder, causing inflammation and infection. This usually happens because of:    Wiping improperly after urinating. Always wipe " from front to back.    Bowel incontinence    Pregnancy    Procedures such as having a catheter inserted    Older age    Not emptying your bladder. This can allow bacteria a chance to grow in your urine.    Dehydration    Constipation    Sex    Use of a diaphragm for birth control   Treatment  Bladder infections are diagnosed by a urine test. They are treated with antibiotics and usually clear up quickly without complications. Treatment helps prevent a more serious kidney infection.  Medicines  Medicines can help in the treatment of a bladder infection:    Take antibiotics until they are used up, even if you feel better. It is important to finish them to make sure the infection has cleared.    You can use acetaminophen or ibuprofen for pain, fever, or discomfort, unless another medicine was prescribed. If you have chronic liver or kidney disease, talk with your healthcare provider before using these medicines. Also talk with your provider if you've ever had a stomach ulcer or gastrointestinal bleeding, or are taking blood-thinner medicines.    If you are given phenazopydridine to reduce burning with urination, it will cause your urine to become a bright orange color. This can stain clothing.  Care and prevention  These self-care steps can help prevent future infections:    Drink plenty of fluids to prevent dehydration and flush out your bladder. Do this unless you must restrict fluids for other health reasons, or your doctor told you not to.    Proper cleaning after going to the bathroom is important. Wipe from front to back after using the toilet to prevent the spread of bacteria.    Urinate more often. Don't try to hold urine in for a long time.    Wear loose-fitting clothes and cotton underwear. Avoid tight-fitting pants.    Improve your diet and prevent constipation. Eat more fresh fruit and vegetables, and fiber, and less junk and fatty foods.    Avoid sex until your symptoms are gone.    Avoid caffeine,  alcohol, and spicy foods. These can irritate your bladder.    Urinate right after intercourse to flush out your bladder.    If you use birth control pills and have frequent bladder infections, discuss it with your doctor.  Follow-up care  Call your healthcare provider if all symptoms are not gone after 3 days of treatment. This is especially important if you have repeat infections.  If a culture was done, you will be told if your treatment needs to be changed. If directed, you can call to find out the results.  If X-rays were done, you will be told if the results will affect your treatment.  Call 911  Call 911 if any of the following occur:    Trouble breathing    Hard to wake up or confusion    Fainting or loss of consciousness    Rapid heart rate  When to seek medical advice  Call your healthcare provider right away if any of these occur:    Fever of 100.4 F (38.0 C) or higher, or as directed by your healthcare provider    Symptoms are not better by the third day of treatment    Back or belly (abdominal) pain that gets worse    Repeated vomiting, or unable to keep medicine down    Weakness or dizziness    Vaginal discharge    Pain, redness, or swelling in the outer vaginal area (labia)  Date Last Reviewed: 10/1/2016    1613-8437 [employer]. [address] [place] [address], [place] [address]. All rights reserved. This information is not intended as a substitute for professional medical care. Always follow your healthcare professional's instructions.

## 2019-09-28 NOTE — PROGRESS NOTES
HPI  Paloma Fitch 69 year old presents to the urgent care with 24-hour history of urgency, dysuria, and frequency.  She noted this morning that she had hematuria.  She did take Septra DS this morning as she has had urinary tract infections in the past with similar presentation and is followed by urology.  She did not have enough medication for a full course of antibiotic therapy.  She denies any nausea, vomiting, diarrhea, fever, chills, or flank pain.    Past Medical History:   Diagnosis Date     Colonic polyp 2012    adenomatous, fu 5 years; fu 9/18 one hyper polyp     Gross hematuria 2016    ct and cysto via Dr. Watt     Intermittent asthma      Palpitations 12/2018    echo nl     SVT (supraventricular tachycardia) (H) 1994    successful catheter ablation done [facility]     Patient Active Problem List   Diagnosis     Colonic polyp     SVT (supraventricular tachycardia) (H)     Intermittent asthma, uncomplicated     Past Surgical History:   Procedure Laterality Date     CARDIAC SURGERY      ablation for SVT/WPW     COLONOSCOPY N/A 9/20/2018    Procedure: COMBINED COLONOSCOPY, SINGLE OR MULTIPLE BIOPSY/POLYPECTOMY BY BIOPSY;  colonoscopy;  Surgeon: Tre Carreon MD;  Location:  GI     ENT SURGERY      wisdom teeth, mandibular mass excision     wpw ablation surgery  41     Allergies   Allergen Reactions     Iodine-131      Shellfish Allergy Hives     Current Outpatient Medications   Medication     EPINEPHrine (EPIPEN/ADRENACLICK/OR ANY BX GENERIC EQUIV) 0.3 MG/0.3ML injection 2-pack     sulfamethoxazole-trimethoprim (BACTRIM DS/SEPTRA DS) 800-160 MG tablet     No current facility-administered medications for this visit.          Review of Systems   Constitutional: Negative for chills and fever.   Respiratory: Negative.    Cardiovascular: Negative.    Gastrointestinal: Negative for abdominal pain, nausea and vomiting.   Genitourinary: Positive for dysuria, frequency, hematuria and urgency. Negative  for flank pain.   Skin: Negative.          Physical Exam  Vitals signs and nursing note reviewed.   Constitutional:       Appearance: She is not ill-appearing.      Comments: BP (!) 174/84   Pulse 111   Temp 97.6  F (36.4  C) (Oral)   Wt 69.4 kg (153 lb)   SpO2 100%   BMI 23.26 kg/m       HENT:      Head: Normocephalic.   Cardiovascular:      Rate and Rhythm: Tachycardia present.      Comments: Elevated BP noted. Patient states she took it at home this week and it was 120/80.   Pulmonary:      Effort: Pulmonary effort is normal.   Abdominal:      Tenderness: There is no right CVA tenderness or left CVA tenderness.   Genitourinary:     Vagina: No vaginal discharge.      Comments: Per patient report  Skin:     General: Skin is warm and dry.      Findings: No rash.   Neurological:      Mental Status: She is alert and oriented to person, place, and time.       UA positive for nitrates and leukocyte esterase  Large amount of blood    Assessment:  1. Urine frequency    2. Acute cystitis with hematuria        Plan:  Orders Placed This Encounter     Urine Microscopic     sulfamethoxazole-trimethoprim (BACTRIM DS/SEPTRA DS) 800-160 MG tablet x 7 days   Culture pending  Instructions regarding self-care of patient/child reviewed.   Written instructions provided in after visit summary and reviewed.  Patient instructed to see primary care provider for new or persistent symptoms.   Red flag symptoms reviewed and patient has been instructed to seek emergent care  Please contact pharmacy for medication questions.  Patient instructed to take medications as directed on package.      Sailaja Delgadillo, DNP, APRN, CNP

## 2019-09-29 LAB
BACTERIA SPEC CULT: NORMAL
SPECIMEN SOURCE: NORMAL

## 2019-10-01 ENCOUNTER — MYC MEDICAL ADVICE (OUTPATIENT)
Dept: FAMILY MEDICINE | Facility: CLINIC | Age: 69
End: 2019-10-01
Payer: MEDICARE

## 2019-10-01 DIAGNOSIS — R30.0 DYSURIA: Primary | ICD-10-CM

## 2019-10-01 NOTE — TELEPHONE ENCOUNTER
Dr Downing Only,  [facility].  Thanks,  Vidhi MCKOY RN

## 2019-10-07 ENCOUNTER — APPOINTMENT (OUTPATIENT)
Dept: LAB | Facility: CLINIC | Age: 69
End: 2019-10-07
Payer: MEDICARE

## 2019-10-07 ENCOUNTER — OFFICE VISIT (OUTPATIENT)
Dept: FAMILY MEDICINE | Facility: CLINIC | Age: 69
End: 2019-10-07
Payer: MEDICARE

## 2019-10-07 VITALS
OXYGEN SATURATION: 100 % | TEMPERATURE: 96.9 F | HEIGHT: 68 IN | WEIGHT: 151 LBS | HEART RATE: 94 BPM | BODY MASS INDEX: 22.88 KG/M2 | DIASTOLIC BLOOD PRESSURE: 95 MMHG | SYSTOLIC BLOOD PRESSURE: 162 MMHG

## 2019-10-07 DIAGNOSIS — R31.0 GROSS HEMATURIA: Primary | ICD-10-CM

## 2019-10-07 LAB
ALBUMIN UR-MCNC: NEGATIVE MG/DL
APPEARANCE UR: CLEAR
BACTERIA #/AREA URNS HPF: ABNORMAL /HPF
BILIRUB UR QL STRIP: ABNORMAL
COLOR UR AUTO: YELLOW
GLUCOSE UR STRIP-MCNC: NEGATIVE MG/DL
HGB UR QL STRIP: NEGATIVE
KETONES UR STRIP-MCNC: ABNORMAL MG/DL
LEUKOCYTE ESTERASE UR QL STRIP: NEGATIVE
NITRATE UR QL: NEGATIVE
PH UR STRIP: 6 PH (ref 5–7)
RBC #/AREA URNS AUTO: ABNORMAL /HPF
SOURCE: ABNORMAL
SP GR UR STRIP: 1.02 (ref 1–1.03)
UROBILINOGEN UR STRIP-ACNC: 0.2 EU/DL (ref 0.2–1)
WBC #/AREA URNS AUTO: ABNORMAL /HPF

## 2019-10-07 PROCEDURE — 81001 URINALYSIS AUTO W/SCOPE: CPT | Performed by: INTERNAL MEDICINE

## 2019-10-07 PROCEDURE — 87086 URINE CULTURE/COLONY COUNT: CPT | Performed by: INTERNAL MEDICINE

## 2019-10-07 PROCEDURE — 99213 OFFICE O/P EST LOW 20 MIN: CPT | Performed by: INTERNAL MEDICINE

## 2019-10-07 ASSESSMENT — MIFFLIN-ST. JEOR: SCORE: 1253.43

## 2019-10-07 NOTE — PATIENT INSTRUCTIONS
Let Dr. Watt know the recent events with the bleeding.  See if he needs to see you again.    Let me know if the symptoms are not gone in the next few days    Jonn Downing M.D.

## 2019-10-08 LAB
BACTERIA SPEC CULT: NO GROWTH
SPECIMEN SOURCE: NORMAL

## 2019-10-08 ASSESSMENT — ASTHMA QUESTIONNAIRES: ACT_TOTALSCORE: 25

## 2019-10-10 NOTE — RESULT ENCOUNTER NOTE
Your urine culture is negative.  Let me know if you have more problems and please see if urology needs to see you again.    If you have any questions please call me.    Jonn Downing M.D.

## 2019-10-29 ENCOUNTER — HEALTH MAINTENANCE LETTER (OUTPATIENT)
Age: 69
End: 2019-10-29

## 2019-12-15 ENCOUNTER — HEALTH MAINTENANCE LETTER (OUTPATIENT)
Age: 69
End: 2019-12-15

## 2020-05-07 ENCOUNTER — HOSPITAL ENCOUNTER (OUTPATIENT)
Facility: CLINIC | Age: 70
Setting detail: OBSERVATION
Discharge: HOME | End: 2020-05-08
Attending: EMERGENCY MEDICINE | Admitting: INTERNAL MEDICINE
Payer: MEDICARE

## 2020-05-07 ENCOUNTER — APPOINTMENT (OUTPATIENT)
Dept: CT IMAGING | Facility: CLINIC | Age: 70
End: 2020-05-07
Attending: EMERGENCY MEDICINE
Payer: MEDICARE

## 2020-05-07 DIAGNOSIS — R03.0 ELEVATED BLOOD PRESSURE READING WITHOUT DIAGNOSIS OF HYPERTENSION: ICD-10-CM

## 2020-05-07 DIAGNOSIS — R00.0 SINUS TACHYCARDIA: ICD-10-CM

## 2020-05-07 DIAGNOSIS — G45.9 TIA (TRANSIENT ISCHEMIC ATTACK): ICD-10-CM

## 2020-05-07 PROBLEM — R42 DIZZINESS: Status: ACTIVE | Noted: 2020-05-07

## 2020-05-07 LAB
ALBUMIN SERPL-MCNC: 3.9 G/DL (ref 3.4–5)
ALP SERPL-CCNC: 48 U/L (ref 40–150)
ALT SERPL W P-5'-P-CCNC: 26 U/L (ref 0–50)
ANION GAP SERPL CALCULATED.3IONS-SCNC: 7 MMOL/L (ref 3–14)
AST SERPL W P-5'-P-CCNC: 22 U/L (ref 0–45)
BASOPHILS # BLD AUTO: 0 10E9/L (ref 0–0.2)
BASOPHILS NFR BLD AUTO: 0.4 %
BILIRUB SERPL-MCNC: 0.4 MG/DL (ref 0.2–1.3)
BUN SERPL-MCNC: 15 MG/DL (ref 7–30)
CALCIUM SERPL-MCNC: 8.9 MG/DL (ref 8.5–10.1)
CHLORIDE SERPL-SCNC: 104 MMOL/L (ref 94–109)
CO2 SERPL-SCNC: 25 MMOL/L (ref 20–32)
CREAT SERPL-MCNC: 0.82 MG/DL (ref 0.52–1.04)
DIFFERENTIAL METHOD BLD: NORMAL
EOSINOPHIL # BLD AUTO: 0.2 10E9/L (ref 0–0.7)
EOSINOPHIL NFR BLD AUTO: 2.5 %
ERYTHROCYTE [DISTWIDTH] IN BLOOD BY AUTOMATED COUNT: 13.1 % (ref 10–15)
GFR SERPL CREATININE-BSD FRML MDRD: 72 ML/MIN/{1.73_M2}
GLUCOSE SERPL-MCNC: 134 MG/DL (ref 70–99)
HCT VFR BLD AUTO: 40.6 % (ref 35–47)
HGB BLD-MCNC: 13.8 G/DL (ref 11.7–15.7)
IMM GRANULOCYTES # BLD: 0 10E9/L (ref 0–0.4)
IMM GRANULOCYTES NFR BLD: 0.3 %
INTERPRETATION ECG - MUSE: NORMAL
LYMPHOCYTES # BLD AUTO: 2.4 10E9/L (ref 0.8–5.3)
LYMPHOCYTES NFR BLD AUTO: 31.5 %
MCH RBC QN AUTO: 31.2 PG (ref 26.5–33)
MCHC RBC AUTO-ENTMCNC: 34 G/DL (ref 31.5–36.5)
MCV RBC AUTO: 92 FL (ref 78–100)
MONOCYTES # BLD AUTO: 0.7 10E9/L (ref 0–1.3)
MONOCYTES NFR BLD AUTO: 8.5 %
NEUTROPHILS # BLD AUTO: 4.4 10E9/L (ref 1.6–8.3)
NEUTROPHILS NFR BLD AUTO: 56.8 %
NRBC # BLD AUTO: 0 10*3/UL
NRBC BLD AUTO-RTO: 0 /100
PLATELET # BLD AUTO: 379 10E9/L (ref 150–450)
POTASSIUM SERPL-SCNC: 3.1 MMOL/L (ref 3.4–5.3)
PROT SERPL-MCNC: 7.8 G/DL (ref 6.8–8.8)
RBC # BLD AUTO: 4.42 10E12/L (ref 3.8–5.2)
SODIUM SERPL-SCNC: 136 MMOL/L (ref 133–144)
T4 FREE SERPL-MCNC: 1.37 NG/DL (ref 0.76–1.46)
TROPONIN I SERPL-MCNC: <0.015 UG/L (ref 0–0.04)
TSH SERPL DL<=0.005 MIU/L-ACNC: 6.71 MU/L (ref 0.4–4)
WBC # BLD AUTO: 7.7 10E9/L (ref 4–11)

## 2020-05-07 PROCEDURE — 25800030 ZZH RX IP 258 OP 636: Performed by: EMERGENCY MEDICINE

## 2020-05-07 PROCEDURE — G0378 HOSPITAL OBSERVATION PER HR: HCPCS

## 2020-05-07 PROCEDURE — 85025 COMPLETE CBC W/AUTO DIFF WBC: CPT | Performed by: EMERGENCY MEDICINE

## 2020-05-07 PROCEDURE — 96360 HYDRATION IV INFUSION INIT: CPT

## 2020-05-07 PROCEDURE — 84443 ASSAY THYROID STIM HORMONE: CPT | Performed by: EMERGENCY MEDICINE

## 2020-05-07 PROCEDURE — 25000132 ZZH RX MED GY IP 250 OP 250 PS 637: Mod: GY | Performed by: EMERGENCY MEDICINE

## 2020-05-07 PROCEDURE — 25000128 H RX IP 250 OP 636: Performed by: INTERNAL MEDICINE

## 2020-05-07 PROCEDURE — 99220 ZZC INITIAL OBSERVATION CARE,LEVL III: CPT | Performed by: INTERNAL MEDICINE

## 2020-05-07 PROCEDURE — 80053 COMPREHEN METABOLIC PANEL: CPT | Performed by: EMERGENCY MEDICINE

## 2020-05-07 PROCEDURE — 84484 ASSAY OF TROPONIN QUANT: CPT | Performed by: EMERGENCY MEDICINE

## 2020-05-07 PROCEDURE — 99285 EMERGENCY DEPT VISIT HI MDM: CPT | Mod: 25

## 2020-05-07 PROCEDURE — 93005 ELECTROCARDIOGRAM TRACING: CPT

## 2020-05-07 PROCEDURE — 70450 CT HEAD/BRAIN W/O DYE: CPT

## 2020-05-07 PROCEDURE — 84439 ASSAY OF FREE THYROXINE: CPT | Performed by: EMERGENCY MEDICINE

## 2020-05-07 PROCEDURE — 96361 HYDRATE IV INFUSION ADD-ON: CPT

## 2020-05-07 RX ORDER — NALOXONE HYDROCHLORIDE 0.4 MG/ML
.1-.4 INJECTION, SOLUTION INTRAMUSCULAR; INTRAVENOUS; SUBCUTANEOUS
Status: DISCONTINUED | OUTPATIENT
Start: 2020-05-07 | End: 2020-05-08 | Stop reason: HOSPADM

## 2020-05-07 RX ORDER — SODIUM CHLORIDE AND POTASSIUM CHLORIDE 150; 900 MG/100ML; MG/100ML
INJECTION, SOLUTION INTRAVENOUS CONTINUOUS
Status: DISCONTINUED | OUTPATIENT
Start: 2020-05-07 | End: 2020-05-08 | Stop reason: HOSPADM

## 2020-05-07 RX ORDER — PROCHLORPERAZINE 25 MG
12.5 SUPPOSITORY, RECTAL RECTAL EVERY 12 HOURS PRN
Status: DISCONTINUED | OUTPATIENT
Start: 2020-05-07 | End: 2020-05-08 | Stop reason: HOSPADM

## 2020-05-07 RX ORDER — ASPIRIN 325 MG
325 TABLET ORAL ONCE
Status: COMPLETED | OUTPATIENT
Start: 2020-05-07 | End: 2020-05-07

## 2020-05-07 RX ORDER — POLYETHYLENE GLYCOL 3350 17 G/17G
17 POWDER, FOR SOLUTION ORAL DAILY PRN
Status: DISCONTINUED | OUTPATIENT
Start: 2020-05-07 | End: 2020-05-08 | Stop reason: HOSPADM

## 2020-05-07 RX ORDER — ACETAMINOPHEN 325 MG/1
650 TABLET ORAL EVERY 4 HOURS PRN
Status: DISCONTINUED | OUTPATIENT
Start: 2020-05-07 | End: 2020-05-08 | Stop reason: HOSPADM

## 2020-05-07 RX ORDER — LABETALOL HYDROCHLORIDE 5 MG/ML
10-40 INJECTION, SOLUTION INTRAVENOUS EVERY 10 MIN PRN
Status: DISCONTINUED | OUTPATIENT
Start: 2020-05-07 | End: 2020-05-08 | Stop reason: HOSPADM

## 2020-05-07 RX ORDER — ONDANSETRON 2 MG/ML
4 INJECTION INTRAMUSCULAR; INTRAVENOUS EVERY 6 HOURS PRN
Status: DISCONTINUED | OUTPATIENT
Start: 2020-05-07 | End: 2020-05-08 | Stop reason: HOSPADM

## 2020-05-07 RX ORDER — ASPIRIN 300 MG/1
300 SUPPOSITORY RECTAL DAILY
Status: DISCONTINUED | OUTPATIENT
Start: 2020-05-08 | End: 2020-05-08 | Stop reason: HOSPADM

## 2020-05-07 RX ORDER — ACETAMINOPHEN 650 MG/1
650 SUPPOSITORY RECTAL EVERY 4 HOURS PRN
Status: DISCONTINUED | OUTPATIENT
Start: 2020-05-07 | End: 2020-05-08 | Stop reason: HOSPADM

## 2020-05-07 RX ORDER — ONDANSETRON 4 MG/1
4 TABLET, ORALLY DISINTEGRATING ORAL EVERY 6 HOURS PRN
Status: DISCONTINUED | OUTPATIENT
Start: 2020-05-07 | End: 2020-05-08 | Stop reason: HOSPADM

## 2020-05-07 RX ORDER — AMOXICILLIN 250 MG
1 CAPSULE ORAL 2 TIMES DAILY PRN
Status: DISCONTINUED | OUTPATIENT
Start: 2020-05-07 | End: 2020-05-08 | Stop reason: HOSPADM

## 2020-05-07 RX ORDER — BISACODYL 10 MG
10 SUPPOSITORY, RECTAL RECTAL DAILY PRN
Status: DISCONTINUED | OUTPATIENT
Start: 2020-05-07 | End: 2020-05-08 | Stop reason: HOSPADM

## 2020-05-07 RX ORDER — AMOXICILLIN 250 MG
2 CAPSULE ORAL 2 TIMES DAILY PRN
Status: DISCONTINUED | OUTPATIENT
Start: 2020-05-07 | End: 2020-05-08 | Stop reason: HOSPADM

## 2020-05-07 RX ORDER — PROCHLORPERAZINE MALEATE 5 MG
5 TABLET ORAL EVERY 6 HOURS PRN
Status: DISCONTINUED | OUTPATIENT
Start: 2020-05-07 | End: 2020-05-08 | Stop reason: HOSPADM

## 2020-05-07 RX ADMIN — SODIUM CHLORIDE 1000 ML: 9 INJECTION, SOLUTION INTRAVENOUS at 21:07

## 2020-05-07 RX ADMIN — ASPIRIN 325 MG ORAL TABLET 325 MG: 325 PILL ORAL at 21:55

## 2020-05-07 RX ADMIN — POTASSIUM CHLORIDE AND SODIUM CHLORIDE: 900; 150 INJECTION, SOLUTION INTRAVENOUS at 22:15

## 2020-05-07 ASSESSMENT — ENCOUNTER SYMPTOMS
WEAKNESS: 1
DIZZINESS: 1
NUMBNESS: 0

## 2020-05-07 ASSESSMENT — MIFFLIN-ST. JEOR: SCORE: 1257.5

## 2020-05-07 NOTE — ED PROVIDER NOTES
History     Chief Complaint:  Dizziness    HPI   Paloma Fitch is a 70 year old female who presents to the emergency department for evaluation of dizziness. The patient reports she was on a walk around 1700 when she had sudden onset of dizziness and difficulty ambulating secondary to her dizziness. She further reports some accompanied generalized weakness and mild vision changes. The patient denies any numbness or focal weakness. She notes she recorded her blood pressure at home after this incident and noted it to be elevated. She presents to the ED out of concern for this. The patient denies any history of smoking, stroke, alcohol abuse. She also denies any chest pain, shortness of breath or fever.   No history of cancer, DVT/PE, hemoptysis, smoking, hormone use, lower extremity symptoms, recent immobilizations.    Allergies:  Iodine-131  Shellfish Allergy     Medications:    The patient is currently on no regular medications.    Past Medical History:    Colonic polyp  Asthma  Palpitations  Supraventricular tachycardia  WPW syndrome status post ablation      Past Surgical History:    Cardiac surgery  [place] teeth surgery, mandibular mass excision  WPW ablation surgery     Family History:    No past pertinent family history.    Social History:  Presents alone.  Never smoker.  Positive for alcohol use.   Marital Status:   [2]     Review of Systems   Eyes: Positive for visual disturbance.   Cardiovascular: Negative for chest pain.   Musculoskeletal: Positive for gait problem.   Neurological: Positive for dizziness and weakness (generalized). Negative for numbness.   All other systems reviewed and are negative.      Physical Exam     Patient Vitals for the past 24 hrs:   BP Temp Temp src Pulse Resp SpO2 Height Weight   05/07/20 1900 (!) 168/96 -- -- 101 -- 99 % -- --   05/07/20 1845 (!) 173/89 -- -- 102 -- 98 % -- --   05/07/20 1830 (!) 172/94 -- -- 97 -- 100 % -- --   05/07/20 1815 (!) 150/100 -- --  "103 -- 100 % -- --   05/07/20 1800 (!) 192/101 -- -- 105 -- 100 % -- --   05/07/20 1745 (!) 182/95 97.8  F (36.6  C) Oral 110 18 99 % 1.727 m (5' 8\") 69.4 kg (153 lb)     Physical Exam  General: Well-nourished, appears to be resting comfortably when I enter the room  Eyes: PERRL, conjunctivae pink no scleral icterus or conjunctival injection  ENT:  Moist mucus membranes, posterior oropharynx clear without erythema or exudates  Respiratory:  Lungs clear to auscultation bilaterally, no crackles/rubs/wheezes.  Good air movement  CV: Mildly tachycardic rate and regular rhythm, no murmurs/rubs/gallops  GI:  Abdomen soft and non-distended.  Normoactive BS.  No tenderness, guarding or rebound  Skin: Warm, dry.  No rashes or petechiae  Musculoskeletal: No peripheral edema or calf tenderness  Neuro: Alert and oriented to person/place/time. PERRL, EOMI no nystagmus, no aphasia/facial droop/dysarthria, tongue midline, symmetric palatal elevation, normal strength at SCM/trapezius/BUE/BLE, normal coordination to FNF at BUE, gait deferred, negative romberg, sensation intact to LT over face/BUE/BLE  Psychiatric: Normal affect      Emergency Department Course   ECG:  Time: 1817  Vent. Rate 97 bpm. SD interval 158. QRS duration 102. QT/QTc 378/480. P-R-T axis 64 91 26.  Sinus rhythm with premature atrial complexes.  Rightward axis.  Incomplete RBBB.  Nonspecific ST abnormality.  Abnormal ECG.  No significant change compared to EKG dated 11/30/18.  Read time: 1823    Imaging:  Radiology findings were communicated with the patient who voiced understanding of the findings.    CT Head without contrast:   No evidence of acute intracranial hemorrhage, mass, or  herniation.  As per radiology.    Laboratory:  Laboratory findings were communicated with the patient who voiced understanding of the findings.    CBC: WBC: 7.7, HGB: 13.8, PLT: 379  CMP: Glucose 134 (H), Potassium 3.1 (L), o/w WNL (Creatinine: 0.82)    TSH: 6.71 (H)    T4 free: " 1.37    1805 Troponin I: <0.015    Interventions:  2107 NS 1L IV Bolus    Emergency Department Course:  Past medical records, nursing notes, and vitals reviewed.    1804 I performed an exam of the patient as documented above.     EKG obtained in the ED, see results above.     IV was inserted and blood was drawn for laboratory testing, results above.    The patient was sent for a CT Head while in the emergency department, results above.     2030 I spoke with Dr. Beebe, hospitalist, who agreed to admit the patient.    2040 I rechecked the patient and discussed the results of her workup thus far.     Findings and plan explained to the Patient who consents to admission. Discussed the patient with Dr. Beebe, who will admit the patient to an obs bed for further monitoring, evaluation, and treatment.    I personally reviewed the laboratory results with the Patient and answered all related questions prior to admission.    Impression & Plan     Medical Decision Making:  Paloma Fitch is a 70 year old female comes today with symptoms of balance and gait disturbance that resolved, consistent with a TIA.  The patient was treated with an aspirin after head CT showed no hemorrhage.  Given that the symptoms were rapidly improving, treatment with thrombolytics is not indicated.  However, given an ABCD2 score of greater than or equal to 3, the patient should be admitted for further workup. The patient had a total score of 3 and received points in the following areas of the TIA Lancet scoring system:  Age > 60,  BP elevated and duration 10-59 min.  She is mildly tachycardic but EKG shows a sinus tachycardia. A TSH is pending.  I spoke with the admitting physician on the hospitalist service who graciously agreed to admit the patient to telemetry for further workup and treatment.    Diagnosis:    ICD-10-CM   1. TIA (transient ischemic attack)  G45.9   2. Elevated blood pressure reading without diagnosis of hypertension   R03.0   3. Sinus tachycardia  R00.0       Disposition:  Admitted to Dr. Beebe.    Scribe Disclosure:  I, Andrew Cameron, am serving as a scribe at 6:00 PM on 5/7/2020 to document services personally performed by Shonna Reed MD based on my observations and the provider's statements to me.      EMERGENCY DEPARTMENT     Shonna Reed MD  05/07/20 [id number]

## 2020-05-08 ENCOUNTER — APPOINTMENT (OUTPATIENT)
Dept: MRI IMAGING | Facility: CLINIC | Age: 70
End: 2020-05-08
Attending: PHYSICIAN ASSISTANT
Payer: MEDICARE

## 2020-05-08 ENCOUNTER — APPOINTMENT (OUTPATIENT)
Dept: PHYSICAL THERAPY | Facility: CLINIC | Age: 70
End: 2020-05-08
Attending: INTERNAL MEDICINE
Payer: MEDICARE

## 2020-05-08 ENCOUNTER — APPOINTMENT (OUTPATIENT)
Dept: CARDIOLOGY | Facility: CLINIC | Age: 70
End: 2020-05-08
Attending: PHYSICIAN ASSISTANT
Payer: MEDICARE

## 2020-05-08 ENCOUNTER — APPOINTMENT (OUTPATIENT)
Dept: MRI IMAGING | Facility: CLINIC | Age: 70
End: 2020-05-08
Attending: INTERNAL MEDICINE
Payer: MEDICARE

## 2020-05-08 VITALS
HEART RATE: 90 BPM | SYSTOLIC BLOOD PRESSURE: 155 MMHG | HEIGHT: 68 IN | DIASTOLIC BLOOD PRESSURE: 95 MMHG | RESPIRATION RATE: 16 BRPM | WEIGHT: 153 LBS | TEMPERATURE: 98.6 F | OXYGEN SATURATION: 98 % | BODY MASS INDEX: 23.19 KG/M2

## 2020-05-08 LAB
CHOLEST SERPL-MCNC: 200 MG/DL
GLUCOSE BLDC GLUCOMTR-MCNC: 113 MG/DL (ref 70–99)
GLUCOSE BLDC GLUCOMTR-MCNC: 154 MG/DL (ref 70–99)
HBA1C MFR BLD: 5.4 % (ref 0–5.6)
HDLC SERPL-MCNC: 103 MG/DL
LDLC SERPL CALC-MCNC: 89 MG/DL
MAGNESIUM SERPL-MCNC: 2.1 MG/DL (ref 1.6–2.3)
NONHDLC SERPL-MCNC: 97 MG/DL
POTASSIUM SERPL-SCNC: 3.8 MMOL/L (ref 3.4–5.3)
TRIGL SERPL-MCNC: 41 MG/DL
TROPONIN I SERPL-MCNC: 0.05 UG/L (ref 0–0.04)
TROPONIN I SERPL-MCNC: 0.13 UG/L (ref 0–0.04)

## 2020-05-08 PROCEDURE — 84132 ASSAY OF SERUM POTASSIUM: CPT | Performed by: INTERNAL MEDICINE

## 2020-05-08 PROCEDURE — 84484 ASSAY OF TROPONIN QUANT: CPT | Mod: 91 | Performed by: INTERNAL MEDICINE

## 2020-05-08 PROCEDURE — G0378 HOSPITAL OBSERVATION PER HR: HCPCS

## 2020-05-08 PROCEDURE — G0426 INPT/ED TELECONSULT50: HCPCS | Mod: G0 | Performed by: PHYSICIAN ASSISTANT

## 2020-05-08 PROCEDURE — 80061 LIPID PANEL: CPT | Performed by: INTERNAL MEDICINE

## 2020-05-08 PROCEDURE — 97161 PT EVAL LOW COMPLEX 20 MIN: CPT | Mod: GP

## 2020-05-08 PROCEDURE — A9585 GADOBUTROL INJECTION: HCPCS | Performed by: INTERNAL MEDICINE

## 2020-05-08 PROCEDURE — 70549 MR ANGIOGRAPH NECK W/O&W/DYE: CPT

## 2020-05-08 PROCEDURE — 25500064 ZZH RX 255 OP 636: Performed by: INTERNAL MEDICINE

## 2020-05-08 PROCEDURE — 25000132 ZZH RX MED GY IP 250 OP 250 PS 637: Mod: GY | Performed by: INTERNAL MEDICINE

## 2020-05-08 PROCEDURE — 83036 HEMOGLOBIN GLYCOSYLATED A1C: CPT | Performed by: INTERNAL MEDICINE

## 2020-05-08 PROCEDURE — 96361 HYDRATE IV INFUSION ADD-ON: CPT

## 2020-05-08 PROCEDURE — 00000146 ZZHCL STATISTIC GLUCOSE BY METER IP

## 2020-05-08 PROCEDURE — 36415 COLL VENOUS BLD VENIPUNCTURE: CPT | Performed by: INTERNAL MEDICINE

## 2020-05-08 PROCEDURE — 70544 MR ANGIOGRAPHY HEAD W/O DYE: CPT

## 2020-05-08 PROCEDURE — 83735 ASSAY OF MAGNESIUM: CPT | Performed by: INTERNAL MEDICINE

## 2020-05-08 PROCEDURE — 93306 TTE W/DOPPLER COMPLETE: CPT

## 2020-05-08 PROCEDURE — 99217 ZZC OBSERVATION CARE DISCHARGE: CPT | Performed by: HOSPITALIST

## 2020-05-08 PROCEDURE — 25000128 H RX IP 250 OP 636: Performed by: INTERNAL MEDICINE

## 2020-05-08 PROCEDURE — 70553 MRI BRAIN STEM W/O & W/DYE: CPT

## 2020-05-08 PROCEDURE — 93306 TTE W/DOPPLER COMPLETE: CPT | Mod: 26 | Performed by: INTERNAL MEDICINE

## 2020-05-08 RX ORDER — POTASSIUM CHLORIDE 29.8 MG/ML
20 INJECTION INTRAVENOUS
Status: DISCONTINUED | OUTPATIENT
Start: 2020-05-08 | End: 2020-05-08

## 2020-05-08 RX ORDER — GADOBUTROL 604.72 MG/ML
10 INJECTION INTRAVENOUS ONCE
Status: COMPLETED | OUTPATIENT
Start: 2020-05-08 | End: 2020-05-08

## 2020-05-08 RX ORDER — ASPIRIN 81 MG/1
81 TABLET, CHEWABLE ORAL DAILY
Qty: 30 TABLET | Refills: 0 | Status: SHIPPED | OUTPATIENT
Start: 2020-05-08 | End: 2021-11-27

## 2020-05-08 RX ORDER — POTASSIUM CHLORIDE 1500 MG/1
20-40 TABLET, EXTENDED RELEASE ORAL
Status: DISCONTINUED | OUTPATIENT
Start: 2020-05-08 | End: 2020-05-08 | Stop reason: HOSPADM

## 2020-05-08 RX ORDER — MAGNESIUM SULFATE HEPTAHYDRATE 40 MG/ML
4 INJECTION, SOLUTION INTRAVENOUS EVERY 4 HOURS PRN
Status: DISCONTINUED | OUTPATIENT
Start: 2020-05-08 | End: 2020-05-08 | Stop reason: HOSPADM

## 2020-05-08 RX ORDER — POTASSIUM CHLORIDE 1.5 G/1.58G
20-40 POWDER, FOR SOLUTION ORAL
Status: DISCONTINUED | OUTPATIENT
Start: 2020-05-08 | End: 2020-05-08 | Stop reason: HOSPADM

## 2020-05-08 RX ORDER — POTASSIUM CHLORIDE 7.45 MG/ML
10 INJECTION INTRAVENOUS
Status: DISCONTINUED | OUTPATIENT
Start: 2020-05-08 | End: 2020-05-08 | Stop reason: HOSPADM

## 2020-05-08 RX ORDER — POTASSIUM CL/LIDO/0.9 % NACL 10MEQ/0.1L
10 INTRAVENOUS SOLUTION, PIGGYBACK (ML) INTRAVENOUS
Status: DISCONTINUED | OUTPATIENT
Start: 2020-05-08 | End: 2020-05-08 | Stop reason: HOSPADM

## 2020-05-08 RX ADMIN — ASPIRIN 325 MG: 325 TABLET, COATED ORAL at 10:01

## 2020-05-08 RX ADMIN — GADOBUTROL 10 ML: 604.72 INJECTION INTRAVENOUS at 14:02

## 2020-05-08 RX ADMIN — POTASSIUM CHLORIDE AND SODIUM CHLORIDE: 900; 150 INJECTION, SOLUTION INTRAVENOUS at 07:29

## 2020-05-08 NOTE — H&P
Admitted:     05/07/2020      PRIMARY CARE PHYSICIAN:  Jonn Downing MD       CODE STATUS:  Full code.      CHIEF COMPLAINT:  Dizziness.      HISTORY OF PRESENT ILLNESS:  Ms. Herbie Fitch is a 70-year-old female with a past medical history significant for SVT, status post Amee-Parkinson-White ablation approximately 30 years ago, who presents to the Emergency Department with the above concern.  History is obtained through discussion with the ED physician and the patient.  The patient notes that she went out for a walk this evening and while she was trying to walk down the hill.  She noted that she felt off balance, not necessarily like she had vertigo or was dizzy, but just that she could not keep her balance.  She tried to keep walking, but eventually had to sit down with concern enough that she called her  to bring her into the ED.  She has never before had anything like this and did not have any associated nausea, shortness of breath, chest pain, palpitations.  Leading up to this, she has felt perfectly normal without any new or concerning symptoms.  She does have a history of Amee-Parkinson-White syndrome and underwent ablation in her 40s and has not had issues with this on a regular basis.  She occasionally will feel a premature beat, but otherwise has not had any palpitations recently.  She has started taking a few new over-the-counter herbal supplements but most of which she has taken in the past.  She otherwise does not take any prescription medications.      In the ED, the patient had basic labs that were unremarkable, but was noted to be tachycardic and slightly hypertensive up into the 180s.  Head CT was negative, but was unable to be completed with contrast given her IODINE CONTRAST ALLERGY.  She at this point is feeling back to normal.      PAST MEDICAL HISTORY:   1.  Supraventricular tachycardia, status post Amee-Parkinson-White ablation in her 40s.   2.  Intermittent asthma, but has not  been treated since her 50s.   3.  Adenomatous colon polyps.      MEDICATIONS:   1.  Epinephrine as needed.   2.  Fish oil.   3.  Echinaea.   4.  Vitamin C.      SOCIAL HISTORY:  The patient has roughly 1 glass of wine per day.  Does not use any tobacco.      FAMILY HISTORY:  Father had a myocardial infarction in his 40s and a CABG in his 70s and mother had a CVA in her 80s.      ALLERGIES:  IODINE CONTRAST AND SHELLFISH.      REVIEW OF SYSTEMS:  The complete review of systems reviewed and negative, save for the pertinent positives, which are recorded in the HPI.      PHYSICAL EXAMINATION:   VITAL SIGNS:  Show blood pressure of 169/102, heart rate 102, respirations 18, satting 98% on room air, temperature 97.8 degrees Fahrenheit.   GENERAL:  The patient is sitting in bed and appears comfortable.   HEENT:  Pupils equal, round, reactive to light, extraocular muscle function intact.  No scleral icterus.  Oropharynx is clear.   NECK:  No lymphadenopathy or thyromegaly.   CARDIOVASCULAR:  Mildly tachycardic without any appreciable murmur, rub or gallop.   PULMONARY:  Lungs are clear to auscultation bilaterally without wheeze or rhonchi.   GASTROINTESTINAL:  Positive bowel sounds, soft, nontender and nondistended.   SKIN:  No rashes or lesions.   LYMPHATICS:  No peripheral edema.   PSYCHIATRIC:  Alert and oriented x 3, normal affect.   NEUROLOGIC:  Cranial nerves II-XII are grossly intact without any focal deficits.  Muscle strength is intact to both upper and lower extremities.  No pronator drift.  Finger-nose-finger testing is normal.      LABORATORY DATA:  CBC, BMP, and LFTs unremarkable.  TSH is slightly elevated at 6.71 with a free T4 of 1.37.  Troponin is undetectable.  A CT of the head is negative.      I personally viewed her EKG which shows sinus rhythm with an incomplete right bundle branch block and some PVCs and possibly some minimal ST depression in lead V5.      ASSESSMENT AND PLAN:   1.  Ms. Herbie Fitch  is a 70-year-old female with a past medical history significant for SVT, status post Amee-Parkinson-White ablation who presents to the Emergency Department with dizziness, now resolved.   2.  Sudden onset of balance issues.  This is now resolved, but possibilities of this could include some sort of arrhythmia versus TIA.  She does not have any infectious symptoms and otherwise appears to be euvolemic with a slightly elevated blood sugar.  At this point, I am going to monitor on telemetry to look for arrhythmias given her history of SVT.  I am also going to obtain an MRI of the brain to look for any evidence of stroke and will continue for now down the TIA stroke protocol.  I am going to defer getting an echocardiogram given that she just had one in 2018 with a preserved EF, otherwise unremarkable.  It is unlikely that any of her supplements are causing symptoms.   3.  History of supraventricular tachycardia, status post Amee-Parkinson-White ablation:  This has not been an issue for her for multiple decades.  We will monitor on telemetry as above.   4.  Disposition:  Brought under observation status and maybe will discharge home as early tomorrow once a Neurology workup is complete.         DAVID ROSENTHAL DO             D: 2020   T: 2020   MT: BLAISE      Name:     DARIO MARTEL   MRN:      [id number]        Account:      [id number]   :      1950        Admitted:     2020                   Document: [id number]       cc: Jonn Downing MD

## 2020-05-08 NOTE — PLAN OF CARE
Discharge Planner SLP   Patient plan for discharge: Did not meet with pt  Current status: SLP: ST orders received and chart reviewed. Pt passed nursing screen. No documented change in swallow/speech/language function.  Barriers to return to prior living situation: None from SLP  Recommendations for discharge: No IP SLP services  Rationale for recommendations: Will complete orders       Entered by: Meli Rockwell 05/08/2020 10:08 AM

## 2020-05-08 NOTE — PHARMACY-ADMISSION MEDICATION HISTORY
Pharmacy Medication History  Admission medication history interview status for the 5/7/2020  admission is complete. See EPIC admission navigator for prior to admission medications     Medication history sources: Patient and Surescripts  Medication history source reliability: Good  Adherence assessment: n/a    Significant changes made to the medication list:  none      Additional medication history information:   none    Medication reconciliation completed by provider prior to medication history? No    Time spent in this activity: 5 minutes      Prior to Admission medications    Medication Sig Last Dose Taking? Auth Provider   EPINEPHrine (EPIPEN/ADRENACLICK/OR ANY BX GENERIC EQUIV) 0.3 MG/0.3ML injection 2-pack Inject 0.3 mLs (0.3 mg) into the muscle as needed for anaphylaxis nilon  Jonn Downing MD

## 2020-05-08 NOTE — PROGRESS NOTES
05/08/20 1440   Quick Adds   Type of Visit Initial PT Evaluation   Living Environment   Lives With child(cheri), adult;spouse   Living Arrangements house   Home Accessibility stairs to enter home;stairs within home   Number of Stairs, Main Entrance other (see comments)  (15)   Stair Railings, Main Entrance railings safe and in good condition   Number of Stairs, Within Home, Primary other (see comments)  (12 between floors)   Stair Railings, Within Home, Primary railings safe and in good condition   Transportation Anticipated family or friend will provide   Living Environment Comment Pt lives with spouse and daughter in a 3Sh with 15 steps to enter and a flight of stairs between each floors   Self-Care   Usual Activity Tolerance good   Current Activity Tolerance good   Regular Exercise No   Equipment Currently Used at Home none   Activity/Exercise/Self-Care Comment Pt was ind with all ADL   Functional Level Prior   Ambulation 0-->independent   Transferring 0-->independent   Toileting 0-->independent   Bathing 0-->independent   Communication 0-->understands/communicates without difficulty   Swallowing 0-->swallows foods/liquids without difficulty   Cognition 0 - no cognition issues reported   Fall history within last six months no   Which of the above functional risks had a recent onset or change? none   Prior Functional Level Comment Pt was ind with ambulation    General Information   Onset of Illness/Injury or Date of Surgery - Date 05/07/20   Referring Physician Kamron Beebe DO    Patient/Family Goals Statement Go  home   Pertinent History of Current Problem (include personal factors and/or comorbidities that impact the POC)  Pt is a 70 yr old female admitted under OBS status for dizziness. MRI negative for acute events.    Precautions/Limitations fall precautions   Weight-Bearing Status - LLE full weight-bearing   Weight-Bearing Status - RLE full weight-bearing   General Observations Pleasant and cooperative  "  General Info Comments Activity: Ambulate with assist.    Cognitive Status Examination   Orientation orientation to person, place and time   Level of Consciousness alert   Follows Commands and Answers Questions 100% of the time   Personal Safety and Judgment intact   Memory intact   Pain Assessment   Patient Currently in Pain No   Range of Motion (ROM)   ROM Comment BLE: WFL   Strength   Strength Comments BLE: grossly 4+/5   Bed Mobility   Bed Mobility Comments Supine<>sit: ind   Transfer Skills   Transfer Comments STS at independent   Gait   Gait Comments 300 ft without AD's, ind   Balance   Balance Comments Sitting: good   Sensory Examination   Sensory Perception no deficits were identified   Coordination   Coordination no deficits were identified   Muscle Tone   Muscle Tone no deficits were identified   General Therapy Interventions   Intervention Comments Pt is at baseline for fucntional mobility and no need for PT intervnetions identified.    Clinical Impression   Criteria for Skilled Therapeutic Intervention evaluation only;no problems identified which require skilled intervention   Functional limitations due to impairments None identified   Clinical Presentation Stable/Uncomplicated   Clinical Presentation Rationale clinical judgement   Clinical Decision Making (Complexity) Low complexity   Anticipated Discharge Disposition Home  (with family)   Patient, Family & other staff in agreement with plan of care Yes   Clinical Impression Comments Pt is near usual fucntional baseline and no need for skilled PT interventions are identified at this time.     [facility] AM-PAC  \"6 Clicks\" V.2 Basic Mobility Inpatient Short Form   1. Turning from your back to your side while in a flat bed without using bedrails? 4 - None   2. Moving from lying on your back to sitting on the side of a flat bed without using bedrails? 4 - None   3. Moving to and from a bed to a chair (including a wheelchair)? 4 - None   4. Standing " up from a chair using your arms (e.g., wheelchair, or bedside chair)? 4 - None   5. To walk in hospital room? 4 - None   6. Climbing 3-5 steps with a railing? 4 - None   Basic Mobility Raw Score (Score out of 24.Lower scores equate to lower levels of function) 24   Total Evaluation Time   Total Evaluation Time (Minutes) 15

## 2020-05-08 NOTE — PROGRESS NOTES
RECEIVING UNIT ED HANDOFF REVIEW    ED Nurse Handoff Report was reviewed by: Anna Florentino RN on May 7, 2020 at 9:44 PM

## 2020-05-08 NOTE — ED NOTES
[facility]  ED Nurse Handoff Report    ED Chief complaint: Dizziness and Hypertension      ED Diagnosis:   Final diagnoses:   TIA (transient ischemic attack)   Elevated blood pressure reading without diagnosis of hypertension   Sinus tachycardia       Code Status: Full Code    Allergies:   Allergies   Allergen Reactions     Iodine-131      Shellfish Allergy Hives       Patient Story: dizziness  Focused Assessment:  Paloma Fitch is a 70 year old female who presents to the emergency department for evaluation of dizziness. The patient reports she was on a walk around 1700 when she had sudden onset of dizziness and difficulty ambulating secondary to her dizziness. She further reports some accompanied generalized weakness and mild vision changes. The patient denies any numbness or focal weakness. She notes she recorded her blood pressure at home after this incident and noted it to be elevated. She presents to the ED out of concern for this. The patient denies any history of smoking, stroke, alcohol abuse. She also denies any chest pain.    Treatments and/or interventions provided: labs head CT  Patient's response to treatments and/or interventions: tolerated well. Calm and cooperative    To be done/followed up on inpatient unit:  TBD    Does this patient have any cognitive concerns?: a and o x 4    Activity level - Baseline/Home:  Independent  Activity Level - Current:   Stand with Assist    Patient's Preferred language: English   Needed?: No    Isolation: None  Infection: Not Applicable  Bariatric?: No    Vital Signs:   Vitals:    05/07/20 1815 05/07/20 1830 05/07/20 1845 05/07/20 1900   BP: (!) 150/100 (!) 172/94 (!) 173/89 (!) 168/96   Pulse: 103 97 102 101   Resp:       Temp:       TempSrc:       SpO2: 100% 100% 98% 99%   Weight:       Height:           Cardiac Rhythm:     Was the PSS-3 completed:   Yes  What interventions are required if any?               Family Comments: no  family is present, pt has updated her spouse via phone  OBS brochure/video discussed/provided to patient/family: Yes              Name of person given brochure if not patient: na              Relationship to patient: na    For the majority of the shift this patient's behavior was Green.   Behavioral interventions performed were na.    ED NURSE PHONE NUMBER: *[phone]

## 2020-05-08 NOTE — DISCHARGE SUMMARY
[facility]  Hospitalist Discharge Summary      Date of Admission:  5/7/2020  Date of Discharge:  5/8/2020  6:08 PM  Discharging Provider: Kamron Nicole MD      Discharge Diagnoses    1. Episode of dizziness, confusion, and hypertension with concern of TIA   2. History of SVT   3. Mild hypokalemia  4. Hypertension, possibly white coat / anxiety   5. Type 2 NSTEMI, likely demand ischemia    Follow-ups Needed After Discharge   Follow-up Appointments     Follow-up and recommended labs and tests       Contact your PCP next week for hospital follow up and ongoing BP   management             Unresulted Labs Ordered in the Past 30 Days of this Admission     No orders found for last 31 day(s).      These results will be followed up by NA    Discharge Disposition   Discharged to home  Condition at discharge: Stable    Hospital Course    Summary:  Dizziness and confusion episode had resolved at time of admission and did not return. She was found to have mild HTN that persisted, however. She does report some degree of anxiety. She will revisit this with her PCP and review her BP log. Neurology completed MR imaging brain and MRA head and neck which showed no abnormality. Echo unremarkable as well. Neurology and hospitalist concur that this is unlikely to represent TIA but that we cannot be certain. Patient opted to have daily baby aspirin with PCP follow up. She declined to start anti-HTN medication and will discuss with PCP.     She had mild bump in troponin likely due to HTN and/or episode of tachycardia. She never had any chest pain and was comfortable with the plan to follow up with PCP.  _________________________________________________________________________________________________________  Ms. Herbie Fitch is a 70-year-old female with a past medical history significant for SVT, status post Amee-Parkinson-White ablation who presents to the Emergency Department with dizziness, now resolved.      Sudden onset of balance issues.  This is now resolved, but possibilities of this could include some sort of arrhythmia versus TIA.  She does not have any infectious symptoms and otherwise appears to be euvolemic with a slightly elevated blood sugar.  At this point, I am going to monitor on telemetry to look for arrhythmias given her history of SVT.  I am also going to obtain an MRI of the brain to look for any evidence of stroke and will continue for now down the TIA stroke protocol.  I am going to defer getting an echocardiogram given that she just had one in 12/2018 with a preserved EF, otherwise unremarkable.  It is unlikely that any of her supplements are causing symptoms.     History of supraventricular tachycardia, status post Amee-Parkinson-White ablation:  This has not been an issue for her for multiple decades.  We will monitor on telemetry as above.     Consultations This Hospital Stay   NEUROLOGY IP CONSULT  PHYSICAL THERAPY ADULT IP CONSULT  OCCUPATIONAL THERAPY ADULT IP CONSULT  SPEECH LANGUAGE PATH ADULT IP CONSULT  SWALLOW EVAL SPEECH PATH AT BEDSIDE IP CONSULT  SMOKING CESSATION PROGRAM IP CONSULT    Code Status   Prior    Time Spent on this Encounter   I, Kamron Nicole MD, personally saw the patient today and spent greater than 30 minutes discharging this patient.       Kamron Nicole MD  [facility]  ______________________________________________________________________    Physical Exam   Vital Signs: Temp: 98.6  F (37  C) Temp src: Oral BP: (!) 155/95 Pulse: 90   Resp: 16 SpO2: 98 % O2 Device: None (Room air)    Weight: 153 lbs 0 oz    Gen: NAD, pleasant  HEENT: Normocephalic, EOMI, MMM  Resp: no crackles,  no wheezes, no increased work of resp  CV: S1S2 heard, reg rhythm, reg rate, no pedal edema  Abdo: soft, nontender, nondistended, bowel sounds present  Ext: calves nontender, well perfused  Neuro: AAOx3, CN grossly intact, no facial asymmetry         Primary Care  Physician   Jonn Downing    Discharge Orders      Reason for your hospital stay    Confusion, dizziness, HTN     Follow-up and recommended labs and tests     Contact your PCP next week for hospital follow up and ongoing BP management     Activity    Your activity upon discharge: activity as tolerated     Discharge Instructions    As you discussed with Neurology, take a baby aspirin daily for the next month.  Also, monitor your blood pressure at least once a day and record the levels in a log. Relay this info to your regular doctor.     Diet    Follow this diet upon discharge: Orders Placed This Encounter      Regular Diet Adult       Significant Results and Procedures   Most Recent 3 CBC's:  Recent Labs   Lab Test 05/07/20  1805 05/01/18  1033   WBC 7.7 7.1   HGB 13.8 13.6   MCV 92 94    340     Most Recent 3 BMP's:  Recent Labs   Lab Test 05/08/20  1111 05/07/20  1805 11/30/18  1336 05/01/18  1033   NA  --  136 135 139   POTASSIUM 3.8 3.1* 3.6 4.2   CHLORIDE  --  104 102 104   CO2  --  25 26 27   BUN  --  15 17 14   CR  --  0.82 0.83 0.86   ANIONGAP  --  7 7 8   RAFIA  --  8.9 9.0 9.0   GLC  --  134* 108* 98     Most Recent 3 Troponin's:  Recent Labs   Lab Test 05/08/20  1111 05/08/20  0415 05/07/20  1805   TROPI 0.052* 0.131* <0.015   ,   Results for orders placed or performed during the hospital encounter of 05/07/20   CT Head w/o Contrast    Narrative    CT SCAN OF THE HEAD WITHOUT CONTRAST   5/7/2020 7:54 PM     HISTORY: Off balance, resolved.    TECHNIQUE: Axial images of the head and coronal reformations without  IV contrast material. Radiation dose for this scan was reduced using  automated exposure control, adjustment of the mA and/or kV according  to patient size, or iterative reconstruction technique.    COMPARISON: None.    FINDINGS: There is no evidence of intracranial hemorrhage, mass, acute  infarct or anomaly. The ventricles are normal in size, shape and  configuration. The brain parenchyma and  subarachnoid spaces are  normal.     The visualized portions of the sinuses and mastoids appear normal. The  bony calvarium and bones of the skull base appear intact.       Impression    IMPRESSION: No evidence of acute intracranial hemorrhage, mass, or  herniation.      CHI CAAL MD   MRI Brain w & w/o contrast    Narrative    MRI BRAIN WITHOUT AND WITH CONTRAST  5/8/2020 2:01 PM     HISTORY:  Transient ischemic attack, initial exam.     TECHNIQUE:  Multiplanar, multisequence MRI of the brain without and  with 10 mL Gadavist.    COMPARISON: Head CT 5/7/2020.     FINDINGS: There is no evidence of acute infarct, hemorrhage, mass, or  herniation. The brain parenchyma, ventricles and subarachnoid spaces  appear normal.     There is no abnormal intracranial enhancement.     The facial structures appear normal.       Impression    IMPRESSION:  Unremarkable brain MRI without evidence of acute infarct,  mass, hemorrhage, or herniation.    CHI CAAL MD   MRA Brain ([place]) wo Contrast    Narrative    MR ANGIOGRAM OF THE HEAD WITHOUT CONTRAST   5/8/2020 1:25 PM     HISTORY: Stroke, follow up.    TECHNIQUE:  3D time-of-flight MR angiogram of the head without  contrast.    COMPARISON: Head CT 5/7/2020.    FINDINGS: The major intracranial arteries including the proximal  branches of the anterior cerebral, middle cerebral, and posterior  cerebral arteries appear patent without vascular cutoff. No aneurysm  identified. No significant stenosis.     The P1 segment of the right posterior cerebral artery is not  visualized and is likely hypoplastic or congenitally absent. The right  posterior cerebral artery is supplied by the patent right posterior  communicating artery.       Impression    IMPRESSION:  Normal MR angiogram of the head.    CHI CAAL MD   MRA Neck (Carotids) wo & w Contrast    Narrative    MRA NECK WITHOUT AND WITH CONTRAST  5/8/2020 2:03 PM     HISTORY: Stroke, follow up.     TECHNIQUE: 2D  time-of-flight MR angiogram of the neck without contrast  and 3D MR angiogram of the neck with  10 mL Gadavist. Estimates of  carotid stenoses are made relative to the distal internal carotid  artery diameters except as noted.     COMPARISON: None.     FINDINGS:    Normal origin of the great vessels from the aortic arch.     Right carotid artery: The right common and internal carotid arteries  are patent. No significant stenosis.     Left carotid artery: The left common and internal carotid arteries are  patent. No significant stenosis.     Vertebral arteries: Vertebral arteries appear patent without evidence  of dissection. No significant stenosis.       Impression    IMPRESSION:  Normal MR angiogram of the neck.     CHI CAAL MD   Echocardiogram Complete    Narrative    [id number]  [id number]  [id number]  [id number]^^MICHELLE^LAUREN           [facility]  Echocardiography Laboratory  [address] [place] [address]        Name: DARIO MARTEL  MRN: [id number]  : 1950  Study Date: 2020 11:27 AM  Age: 70 yrs  Gender: Female  Patient Location: [facility]  Reason For Study: CVA  Ordering Physician: MICHELLE LOYA  Referring Physician: Jonn Downing  Performed By: Grover Sequeira     BSA: 1.8 m2  Height: 68 in  Weight: 153 lb  HR: 90  BP: 152/94 mmHg  _____________________________________________________________________________  __        Procedure  Complete Portable Echo Adult.  _____________________________________________________________________________  __        Interpretation Summary     1. The left ventricle is normal in structure, function and size. The visual  ejection fraction is estimated at 60%.  2. The right ventricle is normal in structure, function and size.  3. No valve disease.     No changes from echo 2018.  _____________________________________________________________________________  __        Left Ventricle  The left ventricle is normal in structure, function  and size. There is normal  left ventricular wall thickness. The visual ejection fraction is estimated at  60%. Left ventricular diastolic function is normal. Normal left ventricular  wall motion.     Right Ventricle  The right ventricle is normal in structure, function and size.     Atria  Normal left atrial size. Right atrial size is normal. There is no atrial shunt  seen.     Mitral Valve  The mitral valve is normal in structure and function.        Tricuspid Valve  There is trace to mild tricuspid regurgitation. The right ventricular systolic  pressure is approximated at 35.3 mmHg plus the right atrial pressure.     Aortic Valve  There is mild trileaflet aortic sclerosis.     Pulmonic Valve  The pulmonic valve is normal in structure and function.     Vessels  Normal ascending, transverse (arch), and descending aorta. The inferior vena  cava was normal in size with preserved respiratory variability.     Pericardium  There is no pericardial effusion.        Rhythm  Sinus rhythm was noted.  _____________________________________________________________________________  __  MMode/2D Measurements & Calculations  IVSd: 0.80 cm     LVIDd: 4.9 cm  LVIDs: 4.1 cm  LVPWd: 1.0 cm  FS: 17.2 %  LV mass(C)d: 155.3 grams  LV mass(C)dI: 85.2 grams/m2  Ao root diam: 3.6 cm  asc Aorta Diam: 3.4 cm  LVOT diam: 2.1 cm  LVOT area: 3.4 cm2  LA Volume (BP): 61.2 ml  LA Volume Index (BP): 33.6 ml/m2  RWT: 0.42           Doppler Measurements & Calculations  MV E max agustín: 81.5 cm/sec  MV A max agustín: 100.4 cm/sec  MV E/A: 0.81  MV dec slope: 350.7 cm/sec2  MV dec time: 0.23 sec  PA acc time: 0.06 sec  TR max agustín: 297.3 cm/sec  TR max P.3 mmHg  Pulm Sys Agustín: 78.1 cm/sec  Pulm Bush Agustín: 66.9 cm/sec  Pulm S/D: 1.2  E/E' avg: 10.9  Lateral E/e': 8.3  Medial E/e': 13.4           _____________________________________________________________________________  __           Report approved by: Darryl Yao 2020 12:38 PM             Discharge Medications   Discharge Medication List as of 5/8/2020  5:42 PM      START taking these medications    Details   aspirin (ASPIRIN 81) 81 MG chewable tablet Take 1 tablet (81 mg) by mouth daily, Disp-30 tablet,R-0, E-Prescribe         CONTINUE these medications which have NOT CHANGED    Details   EPINEPHrine (EPIPEN/ADRENACLICK/OR ANY BX GENERIC EQUIV) 0.3 MG/0.3ML injection 2-pack Inject 0.3 mLs (0.3 mg) into the muscle as needed for anaphylaxis, Disp-0.6 mL, R-1, Local Print           Allergies   Allergies   Allergen Reactions     Iodine-131      Shellfish Allergy Hives

## 2020-05-08 NOTE — PROVIDER NOTIFICATION
Dr. Nicole notified regarding pt's troponin at 0415 (0.131) and low K+ (5/7).     Addendum: 0824: Order received for potassium/magnesium protocol. No new orders regarding trop ( pt has repeat trop at 12 pm. Currently has no chest pain).

## 2020-05-08 NOTE — CONSULTS
"  [facility]    Stroke Consult Note    Reason for Consult:  TIA? 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 She reports she had a normal day yesterday leading up to this, did gardening for several hours. In the afternoon she went for a walk and while walking downhill felt unsteady, wasn't sure if it was just due to the grade. However it didn't get better, and then she started to feel a fullness in her head \"like when your blood pressure is up\". She called a family member who thought she seemed mentally foggy. She took her BP at home and it was 180s systolic which is very atypical for her. She reports she has a home cuff because of her 's HTN and checks it intermittently and it's always 120-130 systolic. /95 on arrival to the ED, hanging around 160 since. She felt fairly normal once she got to the ED and feels back to normal today as well. She has never had any problem like this before and has no neurologic history    TIA Evaluation Summarized  MRI and/or Head CT: MRI brain normal  Intracranial Vascular Imaging: MRA head normal  Cervical Carotid and Vertebral Artery Vascular Imaging: MRA neck normal  Echocardiogram: echo normal  EKG/Telemetry: SR with PACs, [facility]  LDL: 89  A1c: 5.4  Other testing: Not Applicable    ABCD2 Patients Score   Age ? 60 years 1 point 1   Blood Pressure     -SBP ? 140 or DBP ?  90    1 point 1   Clinical Features    -Unilateral weakness   -Speech disturbance w/o weakness    -Other    2 points  1 point    0 points 0   Duration of symptoms    ?60 minutes    10-59 minutes    <10 minutes   2 points  1 point  0 points 1   Diabetes  1 point 0   Patient s ABCD2 Score (0-7) = 3           Impression  Episode of " "dizziness/off balance sensation with some confusion, not especially convincing for TIA given the lack of focality.    She has mild lab abnormalities including K+ of 3.1, TSH 6.71, trop 0.131    Recommendations  - Given lack of stroke risk factors and atypical presentation, would not treat as TIA. Discussed with the patient however that just because her symptoms are atypical does not rule out a cerebrovascular event entirely. She has remained hypertensive throughout her admission, and sustained auto-hypertension is seen in stroke and TIA. I gave her the option to be treated with DAPT as if this was TIA, and she prefers to take aspirin 81mg daily for a month then stop. I think this is reasonable, suspicion is low.   - Continue to check BP daily at home and keep log for PCP  - No further stroke workup needed, ok for discharge from neuro perspective  - Encouraged her to return to ER with any other symptoms    Patient Follow-up    - in the next 1 week(s) with PCP    Thank you for this consult. No further stroke evaluation is recommended, so we will sign off. Please contact us with any additional questions.    Tiara Toro PA-C  Neurology  05/08/2020 4:15 PM  To page me or covering stroke neurology team member, click here: AMCOM  Choose \"On Call\" tab at top, then search dropdown box for \"Neurology Adult\" & press Enter, look for Neuro ICU/Stroke    _____________________________________________________    Past Medical History   Past Medical History:   Diagnosis Date     Colonic polyp 2012    adenomatous, fu 5 years; fu 9/18 one hyper polyp     Gross hematuria 2016    ct and cysto via Dr. Watt     Intermittent asthma      Palpitations 12/2018    echo nl     SVT (supraventricular tachycardia) (H) 1994    successful catheter ablation done [facility]     Past Surgical History   Past Surgical History:   Procedure Laterality Date     CARDIAC SURGERY      ablation for SVT/WPW     COLONOSCOPY N/A 9/20/2018    Procedure: COMBINED " COLONOSCOPY, SINGLE OR MULTIPLE BIOPSY/POLYPECTOMY BY BIOPSY;  colonoscopy;  Surgeon: Tre Carreon MD;  Location:  GI     ENT SURGERY      wisdom teeth, mandibular mass excision     wpw ablation surgery  41     Medications   Home Meds  Prior to Admission medications    Medication Sig Start Date End Date Taking? Authorizing Provider   EPINEPHrine (EPIPEN/ADRENACLICK/OR ANY BX GENERIC EQUIV) 0.3 MG/0.3ML injection 2-pack Inject 0.3 mLs (0.3 mg) into the muscle as needed for anaphylaxis 11/30/18   Jonn Downing MD       Scheduled Meds    aspirin  325 mg Oral Daily    Or     aspirin  300 mg Rectal Daily       Infusion Meds    0.9% sodium chloride + KCl 20 mEq/L 100 mL/hr at 05/08/20 1430     - MEDICATION INSTRUCTIONS -         PRN Meds  acetaminophen, acetaminophen, bisacodyl, labetalol, magnesium sulfate, - MEDICATION INSTRUCTIONS -, melatonin, naloxone, ondansetron **OR** ondansetron, polyethylene glycol, potassium chloride, potassium chloride with lidocaine, potassium chloride, potassium chloride, prochlorperazine **OR** prochlorperazine **OR** prochlorperazine, senna-docusate **OR** senna-docusate    Allergies   Allergies   Allergen Reactions     Iodine-131      Shellfish Allergy Hives     Family History   No family history on file.  Social History   Social History     Tobacco Use     Smoking status: Never Smoker     Smokeless tobacco: Never Used   Substance Use Topics     Alcohol use: Yes     Alcohol/week: 7.0 standard drinks     Types: 7 Standard drinks or equivalent per week     Comment: occ     Drug use: No       Review of Systems   The 10 point Review of Systems is negative other than noted in the HPI or here.        PHYSICAL EXAMINATION   Temp:  [97.8  F (36.6  C)-98.7  F (37.1  C)] 98.6  F (37  C)  Pulse:  [] 90  Resp:  [16-18] 16  BP: (150-192)/() 155/95  SpO2:  [96 %-100 %] 98 %    General:  patient lying in bed without any acute distress    HEENT:   normocephalic/atraumatic  Pulmonary:  no respiratory distress    Neurologic  Mental Status:  alert, oriented x 3, follows commands, speech clear and fluent,   Cranial Nerves:  visual fields intact (tested by patient herself), EOMI with normal smooth pursuit, facial sensation intact and symmetric (tested by nurse), facial movements symmetric, hearing not formally tested but intact to conversation, no dysarthria, shoulder shrug equal bilaterally, tongue protrusion midline  Motor:  no abnormal movements, able to move all limbs antigravity spontaneously with no signs of hemiparesis observed, no pronator drift  Reflexes:  unable to test (telestroke)  Sensory:  light touch sensation intact and symmetric throughout upper and lower extremities (assessed by nurse), no extinction on double simultaneous stimulation (assessed by nurse)  Coordination:  normal finger-to-nose and heel-to-shin bilaterally without dysmetria, rapid alternating movements symmetric  Station/Gait:  unable to test due to telestroke      Dysphagia Screen  Per Nursing    Stroke Scales    NIHSS  Interval    Interval Comments     1a. Level of Consciousness 0-->Alert, keenly responsive   1b. LOC Questions 0-->Answers both questions correctly   1c. LOC Commands 0-->Performs both tasks correctly   2.   Best Gaze 0-->Normal   3.   Visual 0-->No visual loss   4.   Facial Palsy 0-->Normal symmetrical movements   5a. Motor Arm, Left 0-->No drift, limb holds 90 (or 45) degrees for full 10 secs   5b. Motor Arm, Right 0-->No drift, limb holds 90 (or 45) degrees for full 10 secs   6a. Motor Leg, Left 0-->No drift, leg holds 30 degree position for full 5 secs   6b. Motor Leg, right 0-->No drift, leg holds 30 degree position for full 5 secs   7.   Limb Ataxia 0-->Absent   8.   Sensory 0-->Normal, no sensory loss   9.   Best Language 0-->No aphasia, normal   10. Dysarthria 0-->Normal   11. Extinction and Inattention  0-->No abnormality   Total 0 (05/08/20 9882)        Imaging  I personally reviewed all imaging; relevant findings per HPI.    Labs Data   CBC  Recent Labs   Lab 05/07/20  1805   WBC 7.7   RBC 4.42   HGB 13.8   HCT 40.6        Basic Metabolic Panel   Recent Labs   Lab 05/08/20  1111 05/07/20  1805   NA  --  136   POTASSIUM 3.8 3.1*   CHLORIDE  --  104   CO2  --  25   BUN  --  15   CR  --  0.82   GLC  --  134*   RAFIA  --  8.9     Liver Panel  Recent Labs   Lab Test 05/07/20  1805 05/01/18  1033   PROTTOTAL 7.8 7.7   ALBUMIN 3.9 3.8   BILITOTAL 0.4 0.7   ALKPHOS 48 47   AST 22 17   ALT 26 22     INRNo lab results found.   Lipid Profile  Recent Labs   Lab Test 05/08/20  0415 05/01/18  1033   CHOL 200* 230*    93   LDL 89 126*   TRIG 41 57     A1C  Recent Labs   Lab Test 05/08/20  1111   A1C 5.4     Troponin I  Recent Labs   Lab 05/08/20  1111 05/08/20  0415 05/07/20  1805   TROPI 0.052* 0.131* <0.015          Stroke Code / Stroke Consult Data Data     Telestroke Service Details  (for non-emergent stroke consult with tele)  Video start time 05/08/20   1537   Video end time 05/08/20   1609   Type of service telemedicine diagnostic assessment of acute neurological changes   Reason telemedicine is appropriate patient requires assessment with a specialist for diagnosis and treatment of neurological symptoms   Mode of transmission secure interactive audio and video communication per Danielle   Originating site (patient location) [facility]    Distant site (provider location) Provider Home Office   Telemedicine used today to minimize in-person interactions and PPE use due to COVID-19.

## 2020-05-08 NOTE — PLAN OF CARE
Discharge Planner OT   Patient plan for discharge: Home  Current status: OT orders received, chart reviewed, spoke with PT who evaluated pt. PT reports pt moving well for discharge to home and has no OT needs. Family able to assist if needed but pt has been (I) with basic mobility and ADL this hospital stay.  Barriers to return to prior living situation: Defer to PT  Recommendations for discharge: Defer to PT  Rationale for recommendations: Defer to PT       Entered by: Victoria Dasilva 05/08/2020 3:31 PM

## 2020-05-08 NOTE — PLAN OF CARE
Discharge Planner PT   Patient plan for discharge: Home  Current status: PT eval completed and discharged. Pt is a 70 yr old female admitted under OBS status for dizziness. MRI negative for acute events.   Pt lives with spouse and daughter in a [address] house with 15 SONY. Pt was ind at baseline.  Pt was received supine in bed and is ind with all aspects of bed mobility, transfers and gait. Pt went up/down 15 steps using R rail support and mod I. Denied any dizziness during mobility.  At rest (before mobility) /99 HR 97 After mobility: /99    Barriers to return to prior living situation: None anticipated.   Recommendations for discharge: Home with family   Rationale for recommendations: Pt is at usual functional baseline and not need for skilled PT interventions are identified at this time. PT will discharge orders this date.        Entered by: Flakito Jessica 05/08/2020 3:02 PM

## 2020-05-09 NOTE — PLAN OF CARE
Pt A&Ox4; VSS except for elevated BP. Neuros  intact. No c/o dizziness. Denies chest pain. Tolerated PO. Up with SBA. Echo, brain imaging done. Neuro consulted. Discharge orders received/ instructions reviewed with pt. All questions answered. A copy of AVS given. Belongings returned. Pt discharged from floor via w/c accompanied by NA. Family providing transportation to home.

## 2020-05-11 ENCOUNTER — TELEPHONE (OUTPATIENT)
Dept: FAMILY MEDICINE | Facility: CLINIC | Age: 70
End: 2020-05-11

## 2020-05-11 NOTE — TELEPHONE ENCOUNTER
Chief Complaint: Tia (Transient Ischemic Attack),  FRI 08-MAY-2020  1 / 0    [phone] (home)

## 2020-05-11 NOTE — TELEPHONE ENCOUNTER
ED / Discharge Outreach Protocol    Patient Contact    Attempt # 1    Was call answered?  No.  Left message on voicemail with information to call me back.  Lexii Jefferson RN

## 2020-05-11 NOTE — TELEPHONE ENCOUNTER
"ED / Discharge Outreach Protocol    Patient Contact    Attempt # 2    Pt returned call         ED for acute condition Discharge Protocol    \"Hi, my name is Sarah Rmairez RN, a registered nurse, and I am calling from [facility].  I am calling to follow up and see how things are going for you after your recent emergency visit.\"    Tell me how you are doing now that you are home?\"     Monitoring BP   130/90    Slightly weak, but no return of symptoms.               Discharge Instructions    \"Let's review your discharge instructions.  What is/are the follow-up recommendations?  Pt. Response: F/U     \"Has an appointment with your primary care provider been scheduled?\"  Yes. (confirm and remind to bring meds)    Medications    \"Tell me what changed about your medicines when you discharged?\"    Started on baby aspirin     \"What questions do you have about your medications?\"   None        Call Summary    \"What questions or concerns do you have about your recent visit and your follow-up care?\"     none    \"If you have questions or things don't continue to improve, we encourage you contact us through the main clinic number (give number).  Even if the clinic is not open, triage nurses are available 24/7 to help you.     We would like you to know that our clinic has extended hours (provide information).  We also have urgent care (provide details on closest location and hours/contact info)\"    \"Thank you for your time and take care!\"                "

## 2020-05-14 ENCOUNTER — VIRTUAL VISIT (OUTPATIENT)
Dept: FAMILY MEDICINE | Facility: CLINIC | Age: 70
End: 2020-05-14
Payer: MEDICARE

## 2020-05-14 DIAGNOSIS — I21.4 NON-STEMI (NON-ST ELEVATED MYOCARDIAL INFARCTION) (H): ICD-10-CM

## 2020-05-14 DIAGNOSIS — R42 LIGHT HEADED: Primary | ICD-10-CM

## 2020-05-14 DIAGNOSIS — R73.9 ELEVATED BLOOD SUGAR: ICD-10-CM

## 2020-05-14 DIAGNOSIS — R79.89 ELEVATED TROPONIN: ICD-10-CM

## 2020-05-14 DIAGNOSIS — R79.89 ELEVATED TSH: ICD-10-CM

## 2020-05-14 PROCEDURE — 99214 OFFICE O/P EST MOD 30 MIN: CPT | Mod: 95 | Performed by: INTERNAL MEDICINE

## 2020-05-14 NOTE — PROGRESS NOTES
"Paloma Fitch is a 70 year old female who is being evaluated via a billable video visit.      The patient has been notified of following:     \"This video visit will be conducted via a call between you and your physician/provider. We have found that certain health care needs can be provided without the need for an in-person physical exam.  This service lets us provide the care you need with a video conversation.  If a prescription is necessary we can send it directly to your pharmacy.  If lab work is needed we can place an order for that and you can then stop by our lab to have the test done at a later time.    Video visits are billed at different rates depending on your insurance coverage.  Please reach out to your insurance provider with any questions.    If during the course of the call the physician/provider feels a video visit is not appropriate, you will not be charged for this service.\"    Patient has given verbal consent for Video visit? Yes    How would you like to obtain your [place]? [place]    Patient would like the video invitation sent by: Text to cell phone: [phone]    Will anyone else be joining your video visit? No      Subjective     Paloma Fitch is a 70 year old female who presents today via video visit for the following health issues:      Video Start Time: 11:23 AM    Follow up for hosp stay 5/7/20, out for walk and then at 40 minutes felt l.h. and did not feel steady.  Felt a bit foggy and a few skips of the heart.  Family took her to emergency room, blood pressure was high before and at the hospital.  Had been normal 2 days prior.  Family felt she was a little confused.  Hospitalized overnight    Since out has been fine and blood pressure has been fine, highest since home 136/87.  Today 122/85, then 110/76.  Prior to this event at home blood pressure checked often and has been good.  She feels almost back to normal.  ?some anxiety due to events.  She feels has had some " anxiety for long time.  Since out not confused, foggy or dizzy.  No motor or gait or speech changes.  No chest pain or shortness of breath.  No pnd edema.  No fever or coughs or colds.  No gi or genitourinary issues.    Past Medical History:   Diagnosis Date     Colonic polyp 2012    adenomatous, fu 5 years; fu 9/18 one hyper polyp     Gross hematuria 2016    ct and cysto via Dr. Watt     Intermittent asthma      Palpitations 12/2018    echo nl     SVT (supraventricular tachycardia) (H) 1994    successful catheter ablation done u of m     Past Surgical History:   Procedure Laterality Date     CARDIAC SURGERY      ablation for SVT/WPW     COLONOSCOPY N/A 9/20/2018    Procedure: COMBINED COLONOSCOPY, SINGLE OR MULTIPLE BIOPSY/POLYPECTOMY BY BIOPSY;  colonoscopy;  Surgeon: Tre Carreon MD;  Location:  GI     ENT SURGERY      wisdom teeth, mandibular mass excision     wpw ablation surgery  41     Social History     Socioeconomic History     Marital status:      Spouse name: Not on file     Number of children: 2     Years of education: Not on file     Highest education level: Not on file   Occupational History     Occupation: physical therapist, works admin     Employer: [employer]   Social Needs     Financial resource strain: Not on file     Food insecurity     Worry: Not on file     Inability: Not on file     Transportation needs     Medical: Not on file     Non-medical: Not on file   Tobacco Use     Smoking status: Never Smoker     Smokeless tobacco: Never Used   Substance and Sexual Activity     Alcohol use: Yes     Alcohol/week: 7.0 standard drinks     Types: 7 Standard drinks or equivalent per week     Comment: occ     Drug use: No     Sexual activity: Yes     Partners: Male   Lifestyle     Physical activity     Days per week: Not on file     Minutes per session: Not on file     Stress: Not on file   Relationships     Social connections     Talks on phone: Not on file     Gets together:  "Not on file     Attends [employer] service: Not on file     Active member of club or organization: Not on file     Attends meetings of clubs or organizations: Not on file     Relationship status: Not on file     Intimate partner violence     Fear of current or ex partner: Not on file     Emotionally abused: Not on file     Physically abused: Not on file     Forced sexual activity: Not on file   Other Topics Concern     Parent/sibling w/ CABG, MI or angioplasty before 65F 55M? Not Asked   Social History Narrative     Not on file     Current Outpatient Medications   Medication Sig Dispense Refill     aspirin (ASPIRIN 81) 81 MG chewable tablet Take 1 tablet (81 mg) by mouth daily 30 tablet 0     EPINEPHrine (EPIPEN/ADRENACLICK/OR ANY BX GENERIC EQUIV) 0.3 MG/0.3ML injection 2-pack Inject 0.3 mLs (0.3 mg) into the muscle as needed for anaphylaxis 0.6 mL 1     Allergies   Allergen Reactions     Iodine-131      Shellfish Allergy Hives     FAMILY HISTORY NOTED AND REVIEWED    REVIEW OF SYSTEMS: above    PHYSICAL EXAM    There were no vitals taken for this visit.    Patient appears non toxic    ASSESSMENT:  1. Episode of light headed and confusion with completely normal mri and other imaging.  I doubt tia or other neuro event, ?due to anxiety  2. Elevated trop with some subtle ekg changes but no chest pain or shortness of breath.  Will get est echo but told to go to emergency room if chest pain or shortness of breath  3. Elevated blood pressure, fine at home, suspect white coat, monitor it  4. Elevated tsh, sugar, to follow up 2 months    PLAN:  Est echo  to emergency room if chest pain or shortness of breath  Follow up 2 months  Monitor blood pressure    Jonn Downing M.D.                           Objective    There were no vitals taken for this visit.  Estimated body mass index is 23.26 kg/m  as calculated from the following:    Height as of 5/7/20: 1.727 m (5' 8\").    Weight as of 5/7/20: 69.4 kg (153 lb).  Physical Exam "     GENERAL: Healthy, alert and no distress  EYES: Eyes grossly normal to inspection.  No discharge or erythema, or obvious scleral/conjunctival abnormalities.  RESP: No audible wheeze, cough, or visible cyanosis.  No visible retractions or increased work of breathing.    SKIN: Visible skin clear. No significant rash, abnormal pigmentation or lesions.  NEURO: Cranial nerves grossly intact.  Mentation and speech appropriate for age.  PSYCH: Mentation appears normal, affect normal/bright, judgement and insight intact, normal speech and appearance well-groomed.            Video-Visit Details    Type of service:  Video Visit    Video End Time:11:41 AM    Originating Location (pt. Location): Home    Distant Location (provider location):  [facility]     Platform used for Video Visit: [facility]    No follow-ups on file.       Jonn Downing MD

## 2020-05-21 ENCOUNTER — TELEPHONE (OUTPATIENT)
Dept: CARDIOLOGY | Facility: CLINIC | Age: 70
End: 2020-05-21

## 2020-05-22 ENCOUNTER — HOSPITAL ENCOUNTER (OUTPATIENT)
Dept: CARDIOLOGY | Facility: CLINIC | Age: 70
Discharge: HOME | End: 2020-05-22
Attending: INTERNAL MEDICINE | Admitting: INTERNAL MEDICINE
Payer: MEDICARE

## 2020-05-22 DIAGNOSIS — I21.4 NON-STEMI (NON-ST ELEVATED MYOCARDIAL INFARCTION) (H): ICD-10-CM

## 2020-05-22 DIAGNOSIS — R79.89 ELEVATED TROPONIN: ICD-10-CM

## 2020-05-22 PROCEDURE — 93017 CV STRESS TEST TRACING ONLY: CPT

## 2020-05-22 PROCEDURE — 93018 CV STRESS TEST I&R ONLY: CPT | Performed by: INTERNAL MEDICINE

## 2020-05-22 PROCEDURE — 93350 STRESS TTE ONLY: CPT | Mod: 26 | Performed by: INTERNAL MEDICINE

## 2020-05-22 PROCEDURE — 93321 DOPPLER ECHO F-UP/LMTD STD: CPT | Mod: 26 | Performed by: INTERNAL MEDICINE

## 2020-05-22 PROCEDURE — 93325 DOPPLER ECHO COLOR FLOW MAPG: CPT | Mod: 26 | Performed by: INTERNAL MEDICINE

## 2020-05-22 PROCEDURE — 93016 CV STRESS TEST SUPVJ ONLY: CPT | Performed by: INTERNAL MEDICINE

## 2020-05-23 NOTE — RESULT ENCOUNTER NOTE
Your stress test is normal, great news.  Your blood pressure was high.  Please monitor this and let me know if it is not staying below 140/90.    If you have any questions please call me.    Jonn Downing M.D.

## 2020-09-15 ENCOUNTER — HOSPITAL ENCOUNTER (OUTPATIENT)
Dept: MAMMOGRAPHY | Facility: CLINIC | Age: 70
Discharge: HOME | End: 2020-09-15
Attending: INTERNAL MEDICINE | Admitting: INTERNAL MEDICINE
Payer: MEDICARE

## 2020-09-15 DIAGNOSIS — Z12.31 SCREENING MAMMOGRAM, ENCOUNTER FOR: ICD-10-CM

## 2020-09-15 PROCEDURE — 77063 BREAST TOMOSYNTHESIS BI: CPT

## 2021-01-15 ENCOUNTER — HEALTH MAINTENANCE LETTER (OUTPATIENT)
Age: 71
End: 2021-01-15

## 2021-01-29 ENCOUNTER — OFFICE VISIT (OUTPATIENT)
Dept: FAMILY MEDICINE | Facility: CLINIC | Age: 71
End: 2021-01-29
Payer: MEDICARE

## 2021-01-29 ENCOUNTER — NURSE TRIAGE (OUTPATIENT)
Dept: FAMILY MEDICINE | Facility: CLINIC | Age: 71
End: 2021-01-29

## 2021-01-29 VITALS
DIASTOLIC BLOOD PRESSURE: 100 MMHG | HEART RATE: 113 BPM | OXYGEN SATURATION: 99 % | HEIGHT: 68 IN | SYSTOLIC BLOOD PRESSURE: 172 MMHG | TEMPERATURE: 97.1 F | BODY MASS INDEX: 23.19 KG/M2 | WEIGHT: 153 LBS

## 2021-01-29 DIAGNOSIS — K62.5 RECTAL BLEEDING: Primary | ICD-10-CM

## 2021-01-29 DIAGNOSIS — Z91.030 BEE STING ALLERGY: ICD-10-CM

## 2021-01-29 DIAGNOSIS — R19.7 DIARRHEA, UNSPECIFIED TYPE: ICD-10-CM

## 2021-01-29 PROCEDURE — U0003 INFECTIOUS AGENT DETECTION BY NUCLEIC ACID (DNA OR RNA); SEVERE ACUTE RESPIRATORY SYNDROME CORONAVIRUS 2 (SARS-COV-2) (CORONAVIRUS DISEASE [COVID-19]), AMPLIFIED PROBE TECHNIQUE, MAKING USE OF HIGH THROUGHPUT TECHNOLOGIES AS DESCRIBED BY CMS-2020-01-R: HCPCS | Performed by: NURSE PRACTITIONER

## 2021-01-29 PROCEDURE — 99214 OFFICE O/P EST MOD 30 MIN: CPT | Performed by: NURSE PRACTITIONER

## 2021-01-29 PROCEDURE — U0005 INFEC AGEN DETEC AMPLI PROBE: HCPCS | Performed by: NURSE PRACTITIONER

## 2021-01-29 RX ORDER — EPINEPHRINE 0.3 MG/.3ML
0.3 INJECTION SUBCUTANEOUS PRN
Qty: 0.6 ML | Refills: 1 | Status: SHIPPED | OUTPATIENT
Start: 2021-01-29 | End: 2022-02-01

## 2021-01-29 ASSESSMENT — MIFFLIN-ST. JEOR: SCORE: 1252.5

## 2021-01-29 NOTE — TELEPHONE ENCOUNTER
Reason for call:  Patient reporting a symptom    Symptom or request: Diarrhea, Abdominal Cramping and bleeding from rectal but she thinks this maybe from Hemorrhoids   No other symptoms    Duration (how long have symptoms been present): from 2:30 this morning    Have you been treated for this before? No    Additional comments: The patient did a saliva swab this morning but no results   Patient also wants to get a nasal swab for covid-19   She does not believe she has been exposed      Phone Number patient can be reached at:  Home number on file [phone] (home)    Best Time:  anytime    Can we leave a detailed message on this number:  YES    Call taken on 1/29/2021 at 12:01 PM by Michelle Hammond

## 2021-01-29 NOTE — TELEPHONE ENCOUNTER
"TO PCP    Call from patient    Acute onset of cramping pain/diarrhea last night   Every 6 month or so has \"some GI stuff\"   Still having lower abdominal cramping today   Whenever she has a BM now is noticing just a bit of blood -thinks it's related to hemorrhoids and is a \"little mucousy\"     Had colonoscopy in past - has hemorrhoids diagnosed - father had colon cancer  Blood is separate from stools - bright red   Not really pain, more like cramping with diarrhea - comes in waves   Stools were loose until late last night   No more stools now     Had cramping 30 mins ago, previously was 1 hour and a little bit of bleeding     Per protocol, recommended ER for loose/blood stools with abdominal pain. Patient states she is not worried, does not want to go to the ER, plans to be seen if it persists/worsens     States her main concern for calling was that she wants to get a COVID19 test to rule that out as she read diarrhea could be a side effect of that. [place] - but couldn't get enough collected     Pt asking for a PCR test for COVID19 (did discuss that generally COVID request is done through an e-visit and she really should be seen for the symptoms she is experiencing but pt asks that request be forwarded to PCP for a COVID19 test)     Please advise   Franchesca WELLINGTON RN       Reason for Disposition    Bloody, black, or tarry bowel movements    Additional Information    Negative: Shock suspected (e.g., cold/pale/clammy skin, too weak to stand, low BP, rapid pulse)    Negative: Difficult to awaken or acting confused (e.g., disoriented, slurred speech)    Negative: Sounds like a life-threatening emergency to the triager    Protocols used: DIARRHEA-A-OH      "

## 2021-01-29 NOTE — TELEPHONE ENCOUNTER
Called patient     Next 5 appointments (look out 90 days)    Jan 29, 2021  4:30 PM  Office Visit with JOBY Guerrero CNP  [facility] ([facility]) [address] [facility] [id number] [phone]        She agreed to team visit this afternoon    Franchesca WELLINGTON RN

## 2021-01-29 NOTE — PROGRESS NOTES
Assessment & Plan   Problem List Items Addressed This Visit     None      Visit Diagnoses     Rectal bleeding    -  Primary    Relevant Orders    COLORECTAL SURGERY REFERRAL    Bee sting allergy        Relevant Medications    EPINEPHrine (ANY BX GENERIC EQUIV) 0.3 MG/0.3ML injection 2-pack    Diarrhea, unspecified type        Relevant Orders    Symptomatic COVID-19 Virus (Coronavirus) by PCR           Suspect rectal bleeding is internal hemorrhoid, but will send to colorectal as the bleeding seems to be a fair amt.   BP elevated today but she brought all of her reading from the past month and they've been within normal range for the most part. She should continue monitoring at home and follow-up if it remains elevated  Her anxiety has also been a little elevated. She has tried some herbs. Recommended passionflower which is great for settling the mind and nonsedating. If anxiety continues then would recommend follow-up with pcp to discuss longterm treatment and possible therapy.   Refilled epi pen       JOBY Guerrero CNP  M [facility] DOLORES Dow is a 71 year old who presents to clinic today for the following health issues     HPI     Rectal bleeding,   Had several severe episodes of diarrhea last night and had noticed the bleeding around 230 am  10pm last night had significant abd pain   Had diarrhea for several hours initially without blood   Went to bed and continues to have cramping   This morning she had blood with BM   Noticed a hemorrhoid a few days ago with scant amt of blood   Is getting a little cramping, has to run to the bathrrom then will notice blood in the toilet and a couple times in her underwear   Colonoscopy 3 years ago showed hemorrhoids and 1 polyp   Pain is so much better currently   No fever or vomiting     BP at home has been mid 130s/upper 80s. She brought her records from the last month or so.     Has angular chelitis. Derm did biopsy and she was  "started on mupirocin and desonide then added fungal cream. These didn't help. Bleach wash didn't help.      Stress/anxiety has been elevated lately. One of her closest friends was just admitted to [place]. Other things in life had added some stress as well.       Past Medical History:   Diagnosis Date     Colonic polyp 2012    adenomatous, fu 5 years; fu 9/18 one hyper polyp     Dizziness 05/2020    est echo nl     Gross hematuria 2016    ct and cysto via Dr. Watt     Intermittent asthma      Palpitations 12/2018    echo nl     SVT (supraventricular tachycardia) (H) 1994    successful catheter ablation done u of m        Review of Systems   Detailed as above         Objective    BP (!) 172/100 (BP Location: Left arm, Patient Position: Sitting, Cuff Size: Adult Regular)   Pulse 113   Temp 97.1  F (36.2  C) (Temporal)   Ht 1.727 m (5' 8\")   Wt 69.4 kg (153 lb)   SpO2 99%   BMI 23.26 kg/m    There is no height or weight on file to calculate BMI.  Physical Exam  Constitutional:       Appearance: Normal appearance.   Pulmonary:      Effort: Pulmonary effort is normal.   Neurological:      Mental Status: She is alert.   Psychiatric:         Mood and Affect: Mood normal.              "

## 2021-01-31 LAB
LABORATORY COMMENT REPORT: NORMAL
SARS-COV-2 RNA RESP QL NAA+PROBE: NEGATIVE
SARS-COV-2 RNA RESP QL NAA+PROBE: NORMAL
SPECIMEN SOURCE: NORMAL
SPECIMEN SOURCE: NORMAL

## 2021-02-10 ENCOUNTER — TRANSFERRED RECORDS (OUTPATIENT)
Dept: HEALTH INFORMATION MANAGEMENT | Facility: CLINIC | Age: 71
End: 2021-02-10

## 2021-09-04 ENCOUNTER — HEALTH MAINTENANCE LETTER (OUTPATIENT)
Age: 71
End: 2021-09-04

## 2021-10-19 ENCOUNTER — HOSPITAL ENCOUNTER (OUTPATIENT)
Dept: MAMMOGRAPHY | Facility: CLINIC | Age: 71
Discharge: HOME | End: 2021-10-19
Attending: INTERNAL MEDICINE | Admitting: INTERNAL MEDICINE
Payer: MEDICARE

## 2021-10-19 DIAGNOSIS — Z12.31 VISIT FOR SCREENING MAMMOGRAM: ICD-10-CM

## 2021-10-19 PROCEDURE — 77063 BREAST TOMOSYNTHESIS BI: CPT

## 2021-11-27 ENCOUNTER — E-VISIT (OUTPATIENT)
Dept: URGENT CARE | Facility: CLINIC | Age: 71
End: 2021-11-27
Payer: MEDICARE

## 2021-11-27 DIAGNOSIS — N39.0 ACUTE UTI (URINARY TRACT INFECTION): Primary | ICD-10-CM

## 2021-11-27 PROCEDURE — 99421 OL DIG E/M SVC 5-10 MIN: CPT | Performed by: EMERGENCY MEDICINE

## 2021-11-27 RX ORDER — NITROFURANTOIN 25; 75 MG/1; MG/1
100 CAPSULE ORAL 2 TIMES DAILY
Qty: 10 CAPSULE | Refills: 0 | Status: SHIPPED | OUTPATIENT
Start: 2021-11-27 | End: 2021-12-02

## 2021-11-27 NOTE — PATIENT INSTRUCTIONS
Dear Paloma Dow,    Because of the blood, be sure your symptoms and the bleeding stop with the antibiotics. If any further bleeding, contact your PCP. Sometimes other tests are needed.      After reviewing your responses, I've been able to diagnose you with a urinary tract infection, which is a common infection of the bladder with bacteria.  This is not a sexually transmitted infection, though urinating immediately after intercourse can help prevent infections.  Drinking lots of fluids is also helpful to clear your current infection and prevent the next one.      I have sent a prescription for antibiotics to your pharmacy to treat this infection.    It is important that you take all of your prescribed medication even if your symptoms are improving after a few doses.  Taking all of your medicine helps prevent the symptoms from returning.     If your symptoms worsen, you develop pain in your back or stomach, develop fevers, or are not improving in 5 days, please contact your primary care provider for an appointment or visit any of our convenient Walk-in or Urgent Care Centers to be seen, which can be found on our website here.    Thanks again for choosing us as your health care partner,    Miguel A Cardona MD

## 2022-02-19 ENCOUNTER — HEALTH MAINTENANCE LETTER (OUTPATIENT)
Age: 72
End: 2022-02-19

## 2022-03-14 ENCOUNTER — E-VISIT (OUTPATIENT)
Dept: URGENT CARE | Facility: URGENT CARE | Age: 72
End: 2022-03-14
Payer: MEDICARE

## 2022-03-14 DIAGNOSIS — N39.0 ACUTE UTI (URINARY TRACT INFECTION): Primary | ICD-10-CM

## 2022-03-14 PROCEDURE — 99421 OL DIG E/M SVC 5-10 MIN: CPT | Performed by: PHYSICIAN ASSISTANT

## 2022-03-14 RX ORDER — CEFDINIR 300 MG/1
300 CAPSULE ORAL 2 TIMES DAILY
Qty: 14 CAPSULE | Refills: 0 | Status: SHIPPED | OUTPATIENT
Start: 2022-03-14 | End: 2022-03-21

## 2022-03-14 NOTE — PATIENT INSTRUCTIONS
Dear Paloma Fitch    After reviewing your responses, I've been able to diagnose you with a urinary tract infection, which is a common infection of the bladder with bacteria.  This is not a sexually transmitted infection, though urinating immediately after intercourse can help prevent infections.  Drinking lots of fluids is also helpful to clear your current infection and prevent the next one.      I have sent a prescription for antibiotics to your pharmacy to treat this infection.    It is important that you take all of your prescribed medication even if your symptoms are improving after a few doses.  Taking all of your medicine helps prevent the symptoms from returning.     If your symptoms worsen, you develop pain in your back or stomach, develop fevers, or are not improving in 5 days, please contact your primary care provider for an appointment or visit any of our convenient Walk-in or Urgent Care Centers to be seen, which can be found on our website here.    Thanks again for choosing us as your health care partner,    Ольга Hurley PA-C    Urinary Tract Infections in Women  Urinary tract infections (UTIs) are most often caused by bacteria. These bacteria enter the urinary tract. The bacteria may come from inside the body. Or they may travel from the skin outside the rectum or vagina into the urethra. Female anatomy makes it easy for bacteria from the bowel to enter a woman s urinary tract, which is the most common source of UTI. This means women develop UTIs more often than men. Pain in or around the urinary tract is a common UTI symptom. But the only way to know for sure if you have a UTI for the healthcare provider to test your urine. The two tests that may be done are the urinalysis and urine culture.     Types of UTIs    Cystitis. A bladder infection (cystitis) is the most common UTI in women. You may have urgent or frequent need to pee. You may also have pain, burning when you pee, and bloody  urine.    Urethritis. This is an inflamed urethra, which is the tube that carries urine from the bladder to outside the body. You may have lower stomach or back pain. You may also have urgent or frequent need to pee.    Pyelonephritis. This is a kidney infection. If not treated, it can be serious and damage your kidneys. In severe cases, you may need to stay in the hospital. You may have a fever and lower back pain.    Medicines to treat a UTI  Most UTIs are treated with antibiotics. These kill the bacteria. The length of time you need to take them depends on the type of infection. It may be as short as 3 days. If you have repeated UTIs, you may need a low-dose antibiotic for several months. Take antibiotics exactly as directed. Don t stop taking them until all of the medicine is gone. If you stop taking the antibiotic too soon, the infection may not go away. You may also develop a resistance to the antibiotic. This can make it much harder to treat.   Lifestyle changes to treat and prevent UTIs   The lifestyle changes below will help get rid of your UTI. They may also help prevent future UTIs.     Drink plenty of fluids. This includes water, juice, or other caffeine-free drinks. Fluids help flush bacteria out of your body.    Empty your bladder. Always empty your bladder when you feel the urge to pee. And always pee before going to sleep. Urine that stays in your bladder can lead to infection. Try to pee before and after sex as well.    Practice good personal hygiene. Wipe yourself from front to back after using the toilet. This helps keep bacteria from getting into the urethra.    Use condoms during sex. These help prevent UTIs caused by sexually transmitted bacteria. Also don't use spermicides during sex. These can increase the risk for UTIs. Choose other forms of birth control instead. For women who tend to get UTIs after sex, a low-dose of a preventive antibiotic may be used. Be sure to discuss this option with  your healthcare provider.    Follow up with your healthcare provider as directed. He or she may test to make sure the infection has cleared. If needed, more treatment may be started.  [employer] last reviewed this educational content on 7/1/2019 2000-2021 [employer]. All rights reserved. This information is not intended as a substitute for professional medical care. Always follow your healthcare professional's instructions.

## 2022-05-23 ENCOUNTER — E-VISIT (OUTPATIENT)
Dept: URGENT CARE | Facility: CLINIC | Age: 72
End: 2022-05-23
Payer: MEDICARE

## 2022-05-23 DIAGNOSIS — N39.0 ACUTE UTI (URINARY TRACT INFECTION): Primary | ICD-10-CM

## 2022-05-23 PROCEDURE — 99207 PR NO BILLABLE SERVICE THIS VISIT: CPT | Performed by: FAMILY MEDICINE

## 2022-05-23 RX ORDER — SULFAMETHOXAZOLE/TRIMETHOPRIM 800-160 MG
1 TABLET ORAL 2 TIMES DAILY
Qty: 6 TABLET | Refills: 0 | Status: SHIPPED | OUTPATIENT
Start: 2022-05-23 | End: 2022-05-26

## 2022-05-23 NOTE — PATIENT INSTRUCTIONS
Dear Paloma Fitch    After reviewing your responses, I've been able to diagnose you with a urinary tract infection, which is a common infection of the bladder with bacteria.  This is not a sexually transmitted infection, though urinating immediately after intercourse can help prevent infections.  Drinking lots of fluids is also helpful to clear your current infection and prevent the next one.      I have sent a prescription for antibiotics to your pharmacy to treat this infection.    It is important that you take all of your prescribed medication even if your symptoms are improving after a few doses.  Taking all of your medicine helps prevent the symptoms from returning.     If your symptoms worsen, you develop pain in your back or stomach, develop fevers, or are not improving in 5 days, please contact your primary care provider for an appointment or visit any of our convenient Walk-in or Urgent Care Centers to be seen, which can be found on our website here.    Thanks again for choosing us as your health care partner,    Tiara Contreras MD

## 2022-07-11 ENCOUNTER — APPOINTMENT (OUTPATIENT)
Dept: GENERAL RADIOLOGY | Facility: CLINIC | Age: 72
End: 2022-07-11
Attending: EMERGENCY MEDICINE
Payer: MEDICARE

## 2022-07-11 ENCOUNTER — HOSPITAL ENCOUNTER (EMERGENCY)
Facility: CLINIC | Age: 72
Discharge: HOME | End: 2022-07-11
Attending: EMERGENCY MEDICINE | Admitting: EMERGENCY MEDICINE
Payer: MEDICARE

## 2022-07-11 ENCOUNTER — NURSE TRIAGE (OUTPATIENT)
Dept: FAMILY MEDICINE | Facility: CLINIC | Age: 72
End: 2022-07-11

## 2022-07-11 VITALS
BODY MASS INDEX: 22.73 KG/M2 | OXYGEN SATURATION: 98 % | HEIGHT: 68 IN | DIASTOLIC BLOOD PRESSURE: 83 MMHG | RESPIRATION RATE: 14 BRPM | TEMPERATURE: 98 F | HEART RATE: 97 BPM | SYSTOLIC BLOOD PRESSURE: 168 MMHG | WEIGHT: 150 LBS

## 2022-07-11 DIAGNOSIS — F41.9 ANXIETY: ICD-10-CM

## 2022-07-11 DIAGNOSIS — I10 HYPERTENSION, UNSPECIFIED TYPE: ICD-10-CM

## 2022-07-11 LAB
ANION GAP SERPL CALCULATED.3IONS-SCNC: 8 MMOL/L (ref 3–14)
ATRIAL RATE - MUSE: 94 BPM
BASOPHILS # BLD AUTO: 0 10E3/UL (ref 0–0.2)
BASOPHILS NFR BLD AUTO: 0 %
BUN SERPL-MCNC: 15 MG/DL (ref 7–30)
CALCIUM SERPL-MCNC: 8.8 MG/DL (ref 8.5–10.1)
CHLORIDE BLD-SCNC: 97 MMOL/L (ref 94–109)
CO2 SERPL-SCNC: 24 MMOL/L (ref 20–32)
CREAT SERPL-MCNC: 0.81 MG/DL (ref 0.52–1.04)
DIASTOLIC BLOOD PRESSURE - MUSE: NORMAL MMHG
EOSINOPHIL # BLD AUTO: 0 10E3/UL (ref 0–0.7)
EOSINOPHIL NFR BLD AUTO: 0 %
ERYTHROCYTE [DISTWIDTH] IN BLOOD BY AUTOMATED COUNT: 13.1 % (ref 10–15)
GFR SERPL CREATININE-BSD FRML MDRD: 76 ML/MIN/1.73M2
GLUCOSE BLD-MCNC: 116 MG/DL (ref 70–99)
HCT VFR BLD AUTO: 39.5 % (ref 35–47)
HGB BLD-MCNC: 13.2 G/DL (ref 11.7–15.7)
IMM GRANULOCYTES # BLD: 0 10E3/UL
IMM GRANULOCYTES NFR BLD: 0 %
INTERPRETATION ECG - MUSE: NORMAL
LYMPHOCYTES # BLD AUTO: 1.4 10E3/UL (ref 0.8–5.3)
LYMPHOCYTES NFR BLD AUTO: 12 %
MCH RBC QN AUTO: 30.8 PG (ref 26.5–33)
MCHC RBC AUTO-ENTMCNC: 33.4 G/DL (ref 31.5–36.5)
MCV RBC AUTO: 92 FL (ref 78–100)
MONOCYTES # BLD AUTO: 0.8 10E3/UL (ref 0–1.3)
MONOCYTES NFR BLD AUTO: 7 %
NEUTROPHILS # BLD AUTO: 9.1 10E3/UL (ref 1.6–8.3)
NEUTROPHILS NFR BLD AUTO: 81 %
NRBC # BLD AUTO: 0 10E3/UL
NRBC BLD AUTO-RTO: 0 /100
P AXIS - MUSE: 72 DEGREES
PLATELET # BLD AUTO: 352 10E3/UL (ref 150–450)
POTASSIUM BLD-SCNC: 3.1 MMOL/L (ref 3.4–5.3)
PR INTERVAL - MUSE: 150 MS
QRS DURATION - MUSE: 98 MS
QT - MUSE: 362 MS
QTC - MUSE: 452 MS
R AXIS - MUSE: 101 DEGREES
RBC # BLD AUTO: 4.29 10E6/UL (ref 3.8–5.2)
SODIUM SERPL-SCNC: 129 MMOL/L (ref 133–144)
SYSTOLIC BLOOD PRESSURE - MUSE: NORMAL MMHG
T AXIS - MUSE: 47 DEGREES
TROPONIN I SERPL HS-MCNC: 16 NG/L
VENTRICULAR RATE- MUSE: 94 BPM
WBC # BLD AUTO: 11.4 10E3/UL (ref 4–11)

## 2022-07-11 PROCEDURE — 71046 X-RAY EXAM CHEST 2 VIEWS: CPT

## 2022-07-11 PROCEDURE — 85025 COMPLETE CBC W/AUTO DIFF WBC: CPT | Performed by: EMERGENCY MEDICINE

## 2022-07-11 PROCEDURE — 84484 ASSAY OF TROPONIN QUANT: CPT | Performed by: EMERGENCY MEDICINE

## 2022-07-11 PROCEDURE — 250N000013 HC RX MED GY IP 250 OP 250 PS 637: Performed by: EMERGENCY MEDICINE

## 2022-07-11 PROCEDURE — 80048 BASIC METABOLIC PNL TOTAL CA: CPT | Performed by: EMERGENCY MEDICINE

## 2022-07-11 PROCEDURE — 99285 EMERGENCY DEPT VISIT HI MDM: CPT | Mod: 25

## 2022-07-11 PROCEDURE — 36415 COLL VENOUS BLD VENIPUNCTURE: CPT | Performed by: EMERGENCY MEDICINE

## 2022-07-11 PROCEDURE — 93005 ELECTROCARDIOGRAM TRACING: CPT

## 2022-07-11 RX ORDER — LORAZEPAM 1 MG/1
1 TABLET ORAL EVERY 8 HOURS PRN
Qty: 6 TABLET | Refills: 0 | Status: SHIPPED | OUTPATIENT
Start: 2022-07-11 | End: 2022-07-13

## 2022-07-11 RX ORDER — LORAZEPAM 0.5 MG/1
1 TABLET ORAL ONCE
Status: COMPLETED | OUTPATIENT
Start: 2022-07-11 | End: 2022-07-11

## 2022-07-11 RX ADMIN — LORAZEPAM 1 MG: 0.5 TABLET ORAL at 22:31

## 2022-07-11 ASSESSMENT — ENCOUNTER SYMPTOMS
HEADACHES: 0
COUGH: 0
PALPITATIONS: 1
APPETITE CHANGE: 0
DIARRHEA: 0
ABDOMINAL PAIN: 0

## 2022-07-11 NOTE — TELEPHONE ENCOUNTER
"Nurse Triage SBAR    Is this a 2nd Level Triage? YES, LICENSED PRACTITIONER REVIEW IS REQUIRED    Situation: Pts daughter called the clinic stating this same thing happened 2 years ago and is concerned about pts BP. Daughter stated pt had an arrhythmia 2 yrs ago.       Background: This same thing happened 2 years ago per pts daughter. Per pts daughter- she wants her to go to the ED and is trying to make pt go to the ED to be evaluated. Pt looking to know what is recommended. Daughter gave pt 2 asprins today.       Assessment: This morning BP was 138/94 pt was hot and sat down and took it a few more times and its been \"higher\". Per pt BP has gone up to 165/94 today. Pt stated her daughter is very anxious which then makes pt anxious. Per pt the last time this happened they said it could've been NSTEMI. At 1300 BP was 167/97. Then it was 147/97. BP is 180/110 and  at 1415. Around 1419 BP was 179/96 P 113. Pt took BP and it was 190/110. No CP, blurred vision, difficulty breathing. Daughter stated pt is a little confused. Pt did not appear to be confused while on the phone with RN. After assessment and protocol were complete- pt took BP at 1428- it was 190/110. Advised pt to go to ED. Pt asked message be sent to Dr. Downing and a call back with recommendations.       Protocol Recommended Disposition:   See Within 3 Days In Office - however because pts BP went up even higher later on in the phone call- advised pt to go to ED. If the most recent BP is used (190/110)- protocol disposition would be \"see in office today\". Pt stated she wanted to hear what Dr. Downing's recommendations were.     Please call pt back with Dr. Downing's recommendations.     Can we leave a detailed message on this number? YES  Phone number patient can be reached at: [phone]       No C2C on file for pts daughter- informed daughter that I couldn't give any info out.     Nuha Armenta, RN  [facility] Triage    Routed to " "provider    Does the patient meet one of the following criteria for ADS visit consideration? 16+ years old, with an [facility] PCP     TIP  Providers, please consider if this condition is appropriate for management at one of our Acute and Diagnostic Services sites.     If patient is a good candidate, please use dotphrase <dot>triageresponse and select Refer to ADS to document.      Talked with pt- pt stated she has been monitoring her BP for 2 years. If it was been 140/90 consistently to call back. Pt stated it hasn't been. Its been 135/83-85. On 4/17//85 4/24/22 140/85. 6/27//88.     This morning 138/94 and pt was hot and sat down and took it a few more times and its been \"higher\". Per pt BP has gone up to 165/94 today. Pt stated her daughter is very anxious which then makes pt anxious. Per pt the last time this happened they said it could've been NSTEMI.       180/110 .- now @ 1412    Pt is wondering if she can continue to monitor or if she needs to go in?   1. BLOOD PRESSURE: \"What is the blood pressure?\" \"Did you take at least two measurements 5 minutes apart?\"      Most current is 157/97 and 147/97   2. ONSET: \"When did you take your blood pressure?\"      Around 1230  3. HOW: \"How did you obtain the blood pressure?\" (e.g., visiting nurse, automatic home BP monitor)      Electronic   4. HISTORY: \"Do you have a history of high blood pressure?\"      Yes  5. MEDICATIONS: \"Are you taking any medications for blood pressure?\" \"Have you missed any doses recently?\"      No  6. OTHER SYMPTOMS: \"Do you have any symptoms?\" (e.g., headache, chest pain, blurred vision, difficulty breathing, weakness)      A little weakness, unsteady gait (pt can walk) a little confused.       Reason for Disposition    Systolic BP >= 160 OR Diastolic >= 100    Additional Information    Negative: Sounds like a life-threatening emergency to the triager    Negative: Pregnant > 20 weeks or postpartum (< 6 weeks after delivery) and " new hand or face swelling    Negative: Pregnant > 20 weeks and BP > 140/90    Negative: Systolic BP >= 160 OR Diastolic >= 100, and any cardiac or neurologic symptoms (e.g., chest pain, difficulty breathing, unsteady gait, blurred vision)    Negative: Patient sounds very sick or weak to the triager    Negative: BP Systolic BP >= 140 OR Diastolic >= 90 and postpartum (from 0 to 6 weeks after delivery)    Negative: Systolic BP >= 180 OR Diastolic >= 110, and missed most recent dose of blood pressure medication    Negative: Systolic BP >= 180 OR Diastolic >= 110    Negative: Patient wants to be seen    Negative: Ran out of BP medications    Negative: Taking BP medications and feels is having side effects (e.g., impotence, cough, dizziness)    Protocols used: HIGH BLOOD PRESSURE-A-OH

## 2022-07-11 NOTE — TELEPHONE ENCOUNTER
I sent a team's message to Ranulfo asking that she call the patient and have her go right to the emergency room.  Please let me know if the patient refuses.    Thanks    Jonn Downing M.D.

## 2022-07-11 NOTE — TELEPHONE ENCOUNTER
"Called pt to relay Dr. Younger messaged of \"go the ED now\". Called pt. Pt stated \"can you stay on the phone with me while I check my BP one more time... I feel fine\". RN stayed on the phone with her and informed her again that Dr. Downing is recommending she go to the ED now. Pt stated she went up 2 flights of stairs and showered since she took it last. When BP was done reading, RN asked pt what the reading was. Pt responded with \"I dont want to tell you- its high\". Informed pt that we are suggesting she go to the ED now. Pt responded with \"ok\" and was appreciative of the follow up.   "

## 2022-07-11 NOTE — ED TRIAGE NOTES
HTN, slightly dizzy     Triage Assessment     Row Name 07/11/22 0025       Triage Assessment (Adult)    Airway WDL WDL       Respiratory WDL    Respiratory WDL WDL       Skin Circulation/Temperature WDL    Skin Circulation/Temperature WDL WDL       Cardiac WDL    Cardiac Rhythm tachycardic       Peripheral/Neurovascular WDL    Peripheral Neurovascular WDL WDL       Cognitive/Neuro/Behavioral WDL    Cognitive/Neuro/Behavioral WDL WDL

## 2022-07-12 ENCOUNTER — PATIENT OUTREACH (OUTPATIENT)
Dept: CARE COORDINATION | Facility: CLINIC | Age: 72
End: 2022-07-12

## 2022-07-12 ENCOUNTER — TELEPHONE (OUTPATIENT)
Dept: FAMILY MEDICINE | Facility: CLINIC | Age: 72
End: 2022-07-12

## 2022-07-12 DIAGNOSIS — F41.9 ANXIETY: Primary | ICD-10-CM

## 2022-07-12 DIAGNOSIS — Z71.89 OTHER SPECIFIED COUNSELING: ICD-10-CM

## 2022-07-12 NOTE — ED PROVIDER NOTES
History   Chief Complaint:  Hypertension       The history is provided by the patient.      Paloma Fitch is a 72 year old female who presents with hypertension concern. Earlier today, the patient walked up her stairs and she began experiencing heart palpitations. She has a history of PAC's and states her palpitations felt similar and describes it as having skipped beats. She does not currently follow with cardiology. She checks her blood pressure periodically at home and it is usually 130-140 systolic. Today after walking up the stairs, she checked her blood pressure and found it to be elevated. After speaking with her PCP, they advised to come to the ED for further evaluation. While in the ED, her symptoms have mostly resolved. The patient also endorses that she consumes 6-7 16 oz bottles of straight water per day. Additionally, she does note she has been having increased life stressors recently. She denies any increased caffeine use, supplement use, or recent travel. She also denies recent appetite change, headache, chest pain, abdominal pain, or diarrhea. The patient also denies any cough and is fully vaccinated against COVID.       Review of Systems   Constitutional: Negative for appetite change.   Respiratory: Negative for cough.    Cardiovascular: Positive for palpitations (resolved). Negative for chest pain.   Gastrointestinal: Negative for abdominal pain and diarrhea.   Neurological: Negative for headaches.   All other systems reviewed and are negative.      Allergies:  Iodine-131  Shellfish Allergy    Medications:  The patient is currently on no regular medications.    Past Medical History:     Colonic polyp  SVT  Intermittent asthma  PAC's     Past Surgical History:    Ablation for SVT/WPW  Colonoscopy  Snowflake teeth extraction   Mandibular mass excision      Family History:    The patient has no known family history.    Social History:  The patient presents to the ED with her daughter.   PCP:  "Dr. Downing    Physical Exam     Patient Vitals for the past 24 hrs:   BP Temp Temp src Pulse Resp SpO2 Height Weight   07/11/22 2330 (!) 168/83 -- -- 97 -- 98 % -- --   07/11/22 2315 (!) 160/97 -- -- 99 -- 97 % -- --   07/11/22 2300 (!) 181/92 -- -- 95 -- 98 % -- --   07/11/22 2245 (!) 181/88 -- -- 85 14 99 % -- --   07/11/22 2230 (!) 171/86 -- -- 96 15 100 % -- --   07/11/22 2215 (!) 176/95 -- -- 92 15 100 % -- --   07/11/22 2212 (!) 184/101 -- -- 98 18 99 % -- --   07/11/22 2210 (!) 184/101 -- -- 105 18 100 % -- --   07/11/22 2205 (!) 186/103 -- -- -- -- -- -- --   07/11/22 1602 (!) 186/77 98  F (36.7  C) -- 104 16 -- -- --   07/11/22 1554 (!) 186/77 97.3  F (36.3  C) Temporal 95 14 97 % 1.727 m (5' 8\") 68 kg (150 lb)       Physical Exam    Physical Exam   Constitutional:  Patient is oriented to person, place, and time. They appear well-developed and well-nourished. Mild distress secondary to anxiety   HENT:   Mouth/Throat:   Oropharynx is clear and moist.   Eyes:    Conjunctivae normal and EOM are normal. Pupils are equal, round, and reactive to light.   Neck:    Normal range of motion.   Cardiovascular: Normal rate, regular rhythm and normal heart sounds.  Exam reveals no gallop and no friction rub.  No murmur heard. Occasional extra beat.   Pulmonary/Chest:  Effort normal and breath sounds normal. Patient has no wheezes. Patient has no rales.   Abdominal:   Soft. Bowel sounds are normal. Patient exhibits no mass. There is no tenderness. There is no rebound and no guarding.   Musculoskeletal:  Normal range of motion. Patient exhibits no edema.   Neurological:   Patient is alert and oriented to person, place, and time. Patient has normal strength. No cranial nerve deficit or sensory deficit. GCS 15  Skin:   Skin is warm and dry. No rash noted. No erythema.   Psychiatric:   Mildly anxious.       Emergency Department Course   ECG  ECG results from 07/11/22   EKG 12-lead, tracing only     Value    Systolic Blood " Pressure     Diastolic Blood Pressure     Ventricular Rate 94    Atrial Rate 94    VA Interval 150    QRS Duration 98        QTc 452    P Axis 72    R AXIS 101    T Axis 47    Interpretation ECG      Sinus rhythm with Premature atrial complexes  Rightward axis  Incomplete right bundle branch block  Nonspecific ST abnormality  Abnormal ECG  When compared with ECG of 07-MAY-2020 18:17,  No significant change was found  Confirmed by GENERATED REPORT, COMPUTER ([id number]),  Aasen, Bradley ([id number]) on 7/11/2022 4:37:57 PM         Imaging:  XR Chest 2 Views   Final Result   IMPRESSION: Mild hyperinflation both lungs. Slight thoracolumbar curvature with mild hypertrophic changes. No acute cardiopulmonary abnormalities.        Report per radiology    Laboratory:  Labs Ordered and Resulted from Time of ED Arrival to Time of ED Departure   BASIC METABOLIC PANEL - Abnormal       Result Value    Sodium 129 (*)     Potassium 3.1 (*)     Chloride 97      Carbon Dioxide (CO2) 24      Anion Gap 8      Urea Nitrogen 15      Creatinine 0.81      Calcium 8.8      Glucose 116 (*)     GFR Estimate 76     CBC WITH PLATELETS AND DIFFERENTIAL - Abnormal    WBC Count 11.4 (*)     RBC Count 4.29      Hemoglobin 13.2      Hematocrit 39.5      MCV 92      MCH 30.8      MCHC 33.4      RDW 13.1      Platelet Count 352      % Neutrophils 81      % Lymphocytes 12      % Monocytes 7      % Eosinophils 0      % Basophils 0      % Immature Granulocytes 0      NRBCs per 100 WBC 0      Absolute Neutrophils 9.1 (*)     Absolute Lymphocytes 1.4      Absolute Monocytes 0.8      Absolute Eosinophils 0.0      Absolute Basophils 0.0      Absolute Immature Granulocytes 0.0      Absolute NRBCs 0.0     TROPONIN I - Normal    Troponin I High Sensitivity 16            Emergency Department Course:             Reviewed:  I reviewed nursing notes, vitals and past medical history    Assessments:  2155 I obtained history and examined the patient as noted above.    2330 I rechecked the patient and explained findings.     Interventions:  2231 Ativan 1 mg PO    Disposition:  The patient was discharged to home.     Impression & Plan     CMS Diagnoses: None      Medical Decision Making:  Paloma Fitch is a 72-year-old female presenting to the emergency department with concerns for hypertension.  She states her normal blood pressure is fairly well controlled.  She has had increasing anxiety recently due to family issues.  At the time that I saw her she had no focal neurologic deficits, no headache or chest pain or shortness of breath.  EKG was performed in triage which shows occasional PACs which is what she is familiar with.  Basic blood work shows a normal troponin and renal function.  She does have some lower sodium and she does drink quite a bit of water a day so I will have her decrease her water and to have this follow-up with her primary.  Chest x-ray shows no pleural effusion.  Repeat blood pressure did show improvement.  She does have a lot of stress and anxiety going on right now so certainly this could be contributing, she also states she has an element of whitecoat syndrome.  I gave her Ativan here and I did agree to write her for a few as she requested something to help her anxiety for the next couple of days.  I think given her work-up today the improvement of the blood pressure that she is safe for discharge.  There is no signs of malignant hypertension or endorgan damage.  She will track her blood pressures daily and provide this data for her primary care doctor to further assess whether she truly has essential hypertension.  This certainly could be a high level of chronic stress and anxiety due to family issues and if this is the case then this also can be treated with her primary care doctor with antianxiety medications.  Patient feels comfortable with this plan.    Diagnosis:    ICD-10-CM    1. Hypertension, unspecified type  I10    2. Anxiety  F41.9         Discharge Medications:  Discharge Medication List as of 7/11/2022 11:47 PM      START taking these medications    Details   LORazepam (ATIVAN) 1 MG tablet Take 1 tablet (1 mg) by mouth every 8 hours as needed for anxiety, Disp-6 tablet, R-0, Local Print             Scribe Disclosure:  I, Kasey Weems, am serving as a scribe at 9:48 PM on 7/11/2022 to document services personally performed by Zuly Rea MD based on my observations and the provider's statements to me.              Zuly Rea MD  07/12/22 0022

## 2022-07-12 NOTE — PROGRESS NOTES
Clinic Care Coordination Contact  [facility]/Voicemail       Clinical Data: Care Coordinator Outreach  Outreach attempted x 1.  Left message on patient's voicemail with call back information and requested return call.  Plan: Care Coordinator will try to reach patient again in 1-2 business days.    Chaya Ch  Care Transitions Assistant  [facility]

## 2022-07-12 NOTE — TELEPHONE ENCOUNTER
Patient has upcoming post ED visit with you. This medication was prescribed in the ED.     Alaina Hoskins RN  [facility]

## 2022-07-13 RX ORDER — LORAZEPAM 1 MG/1
1 TABLET ORAL EVERY 8 HOURS PRN
Qty: 6 TABLET | Refills: 0 | Status: SHIPPED | OUTPATIENT
Start: 2022-07-13 | End: 2022-07-26

## 2022-07-13 NOTE — PROGRESS NOTES
Clinic Care Coordination Contact  [facility]/Voicemail    Clinical Data: Care Coordinator Outreach  Outreach attempted x 2. Left message on patient's voicemail with call back information and requested return call.    Plan:Care Coordinator will do no further outreaches at this time.    KATY Betancourt  [phone]  [facility] Resource [facility]

## 2022-07-26 ENCOUNTER — VIRTUAL VISIT (OUTPATIENT)
Dept: FAMILY MEDICINE | Facility: CLINIC | Age: 72
End: 2022-07-26
Payer: MEDICARE

## 2022-07-26 DIAGNOSIS — I47.10 SVT (SUPRAVENTRICULAR TACHYCARDIA) (H): ICD-10-CM

## 2022-07-26 DIAGNOSIS — F41.9 ANXIETY: ICD-10-CM

## 2022-07-26 DIAGNOSIS — R03.0 ELEVATED BP WITHOUT DIAGNOSIS OF HYPERTENSION: Primary | ICD-10-CM

## 2022-07-26 DIAGNOSIS — E87.6 HYPOKALEMIA: ICD-10-CM

## 2022-07-26 DIAGNOSIS — R79.89 LOW SERUM SODIUM: ICD-10-CM

## 2022-07-26 DIAGNOSIS — R73.9 ELEVATED BLOOD SUGAR: ICD-10-CM

## 2022-07-26 PROCEDURE — 99214 OFFICE O/P EST MOD 30 MIN: CPT | Mod: 95 | Performed by: INTERNAL MEDICINE

## 2022-07-26 RX ORDER — LORAZEPAM 1 MG/1
1 TABLET ORAL EVERY 8 HOURS PRN
Qty: 6 TABLET | Refills: 0 | Status: SHIPPED | OUTPATIENT
Start: 2022-07-26 | End: 2023-01-24

## 2022-07-26 NOTE — PROGRESS NOTES
"Paloma is a 72 year old who is being evaluated via a billable video visit.      How would you like to obtain your AVS? MyChart  If the video visit is dropped, the invitation should be resent by: Text to cell phone: [phone]  Will anyone else be joining your video visit? No  {If patient encounters technical issues they should call [phone] :052175}        {PROVIDER CHARTING PREFERENCE:451488}    Subjective   Paloma is a 72 year old{ACCOMPANIED BY STATEMENT (Optional):550620}, presenting for the following health issues:  No chief complaint on file.      HPI       Hospital Follow-up Visit:    Hospital/Nursing Home/IP Rehab Facility: {INPT AND SNF DISCHARGE:731727}  Date of Admission: ***  Date of Discharge: ***  Reason(s) for Admission: ***    Was your hospitalization related to COVID-19? {Yes add questions_No:475022}  Problems taking medications regularly:  {NONE DEFAULTED:958994::\"None\"}  Medication changes since discharge: {NONE DEFAULTED:859407::\"None\"}  Problems adhering to non-medication therapy:  {NONE DEFAULTED:900585::\"None\"}    Summary of hospitalization:  {HOSPITAL DISCHARGE SUMMARY INFO:700951::\"[facility] hospital discharge summary reviewed\"}  Diagnostic Tests/Treatments reviewed.  Follow up needed: {NONE DEFAULTED:469239::\"none\"}  Other Healthcare Providers Involved in Patient s Care:         {those currently involved after discharge:201272::\"None\"}  Update since discharge: {IMPROVED DEFAULT:648612::\"improved.\"} {TIP  Include information from family/caregivers, SNF, Care Coordination :315659}      Post Medication Reconciliation Status:        Plan of care communicated with {Communicate Plan to:892748::\"patient\"}     {Reference  Coding guidelines- Moderate Complexity F2F/Video within 7 - 14 days of discharge [id number], High Complexity F2F/Video within 7 days [id number] or [id number] days [id number] :890416}      {additonal problems for provider to add (Optional):885066}    Review of Systems   {ROS " "COMP (Optional):709135}      Objective           Vitals:  No vitals were obtained today due to virtual visit.    Physical Exam   {video visit exam brief selected:982214::\"GENERAL: Healthy, alert and no distress\",\"EYES: Eyes grossly normal to inspection.  No discharge or erythema, or obvious scleral/conjunctival abnormalities.\",\"RESP: No audible wheeze, cough, or visible cyanosis.  No visible retractions or increased work of breathing.  \",\"SKIN: Visible skin clear. No significant rash, abnormal pigmentation or lesions.\",\"NEURO: Cranial nerves grossly intact.  Mentation and speech appropriate for age.\",\"PSYCH: Mentation appears normal, affect normal/bright, judgement and insight intact, normal speech and appearance well-groomed.\"}    {Diagnostic Test Results (Optional):512421}    {AMBULATORY ATTESTATION (Optional):656749}        Video-Visit Details    Video Start Time: {video visit start/end time for provider to select:830554}    Type of service:  Video Visit    Video End Time:{video visit start/end time for provider to select:797109}    Originating Location (pt. Location): {video visit patient location:780302::\"Home\"}    Distant Location (provider location):  [facility]     Platform used for Video Visit: {Virtual Visit Platforms:054880::\"[employer]\"}    .  ..  "

## 2022-07-26 NOTE — PROGRESS NOTES
Paloma is a 72 year old who is being evaluated via a billable video visit.      How would you like to obtain your AVS? MyChart  If the video visit is dropped, the invitation should be resent by: Text to cell phone: l  Will anyone else be joining your video visit? No            Subjective   Paloma is a 72 year old, presenting for the following health issues:  No chief complaint on file.      This is a very pleasant 72-year-old who I am seeing in emergency room follow-up for her hypertension, low potassium, low sodium, and anxiety.    The patient on 7/11/22 went to the ER when she developed palpitations and then checked her blood pressure and it was elevated and she came to the ER for further evaluation.  Her symptoms had resolved in the emergency room.  It was noted by the ER physician that she drinks 6-7 bottles of straight water a day.  In the emergency room her blood pressure was elevated.  I personally reviewed her EKG which showed sinus rhythm with premature atrial contractions with an incomplete right bundle branch block and nonspecific ST-T wave changes.  It was noted by the ER doctor that she was having a fair amount of stress recently.  Prior blood pressure readings have been normal.    The patient does not have a history of hypertension.  She does have a history of SVT which was ablated in the past.  Does not smoke or drink excessively, use salt, or NSAIDs.  No known family history of hypertension.    I last saw the patient approximately 2 years ago by virtual visit.  It was noted that she was slightly hypertensive at that time and she was told to monitor it.  She also because of lightheadedness and a stress echo which was negative for myocardial ischemia with resting hypertension and hypertensive response to exercise.    The patient has had significant stressors recently.  Her daughter is a  doing video and there is been a fair amount of stress related to some of that.    As noted her sugar was  also slightly high in the emergency room          Was your hospitalization related to COVID-19? No   Problems taking medications regularly:  None  Medication changes since discharge: None  Problems adhering to non-medication therapy:  None            Vitals:  No vitals were obtained today due to virtual visit.    Physical Exam   GENERAL: Healthy, alert and no distress  EYES: Eyes grossly normal to inspection.  No discharge or erythema, or obvious scleral/conjunctival abnormalities.  RESP: No audible wheeze, cough, or visible cyanosis.  No visible retractions or increased work of breathing.    SKIN: Visible skin clear. No significant rash, abnormal pigmentation or lesions.  NEURO: Cranial nerves grossly intact.  Mentation and speech appropriate for age.  PSYCH: Mentation appears normal, affect normal/bright, judgement and insight intact, normal speech and appearance well-groomed.    ASSESSMENT:  1. Hypertension, ?real, follow up readings as noted by her [employer] message yesterday very good.  Certainly there is no rush to treat.  She has had a fair amount of stress and anxiety recently and I suspect that this is contributing.  I doubt a secondary cause of elevated blood pressure.  2.  Anxiety, related to stress from her daughter.  We discussed use of medication, an SSRI daily versus a as needed.  She did use lorazepam from the ER and it worked great and she does not feel she needs a daily medication.  I think at this point is reasonable to just continue lorazepam and monitor it but she will call if it worsens.  3.  Hypokalemia, question significant, will repeat it.  4.  Hyponatremia, question significant.  I doubt SIADH, no signs of heart failure, I doubt thyroid, I doubt adrenal insufficiency.  Suspect it could be delusional from her water use.  5. Svt, doubt significant, no more palp, follow.    PLAN:  Monitor her blood pressure and send me the readings in 4 weeks.  She will use Ativan as needed, refill sent in.  She  will come back for follow-up labs.  I will also check her sugar as it was slightly high.  She will schedule wellness visit in 3 to 4 months.      Jonn Downing M.D.  31 minutes on the day of the encounter doing chart review, history and exam, documentation and further activities as noted above.              Video-Visit Details    Video Start Time: 1:20 PM    Type of service:  Video Visit    Video End Time:1:37    Originating Location (pt. Location): Home    Distant Location (provider location):  [facility]     Platform used for Video Visit: [employer]    .  ..

## 2022-07-26 NOTE — PATIENT INSTRUCTIONS
Monitor your blood pressure 3x a week after sitting for 5 to 10 minutes and send me those in 4 weeks  Follow up for a fasting lab in the next 2 weeks.  [facility]  Let's do your wellness visit in 3 to 4 months  Use the lorazepam as needed but let me know if the anxiety worsens.        Jonn Downing M.D.

## 2022-08-02 ENCOUNTER — LAB (OUTPATIENT)
Dept: LAB | Facility: CLINIC | Age: 72
End: 2022-08-02
Payer: MEDICARE

## 2022-08-02 DIAGNOSIS — R03.0 ELEVATED BP WITHOUT DIAGNOSIS OF HYPERTENSION: ICD-10-CM

## 2022-08-02 DIAGNOSIS — E87.6 HYPOKALEMIA: ICD-10-CM

## 2022-08-02 DIAGNOSIS — R79.89 LOW SERUM SODIUM: ICD-10-CM

## 2022-08-02 DIAGNOSIS — R73.9 ELEVATED BLOOD SUGAR: ICD-10-CM

## 2022-08-02 LAB — HBA1C MFR BLD: 5.3 % (ref 0–5.6)

## 2022-08-02 PROCEDURE — 83036 HEMOGLOBIN GLYCOSYLATED A1C: CPT

## 2022-08-02 PROCEDURE — 80048 BASIC METABOLIC PNL TOTAL CA: CPT

## 2022-08-02 PROCEDURE — 36415 COLL VENOUS BLD VENIPUNCTURE: CPT

## 2022-08-03 LAB
ANION GAP SERPL CALCULATED.3IONS-SCNC: 11 MMOL/L (ref 3–14)
BUN SERPL-MCNC: 15 MG/DL (ref 7–30)
CALCIUM SERPL-MCNC: 9.1 MG/DL (ref 8.5–10.1)
CHLORIDE BLD-SCNC: 104 MMOL/L (ref 94–109)
CO2 SERPL-SCNC: 21 MMOL/L (ref 20–32)
CREAT SERPL-MCNC: 0.88 MG/DL (ref 0.52–1.04)
GFR SERPL CREATININE-BSD FRML MDRD: 69 ML/MIN/1.73M2
GLUCOSE BLD-MCNC: 117 MG/DL (ref 70–99)
POTASSIUM BLD-SCNC: 4.5 MMOL/L (ref 3.4–5.3)
SODIUM SERPL-SCNC: 136 MMOL/L (ref 133–144)

## 2022-08-03 NOTE — RESULT ENCOUNTER NOTE
Good news, the sodium and potassium are back to normal.  Your sugar is slightly high but the 3 month one, the hemoglobin a1c is fine so no worries.    Jonn Downing M.D.

## 2022-10-16 ENCOUNTER — HEALTH MAINTENANCE LETTER (OUTPATIENT)
Age: 72
End: 2022-10-16

## 2022-11-10 ENCOUNTER — HOSPITAL ENCOUNTER (OUTPATIENT)
Dept: MAMMOGRAPHY | Facility: CLINIC | Age: 72
Discharge: HOME | End: 2022-11-10
Attending: INTERNAL MEDICINE | Admitting: INTERNAL MEDICINE
Payer: MEDICARE

## 2022-11-10 DIAGNOSIS — Z12.31 VISIT FOR SCREENING MAMMOGRAM: ICD-10-CM

## 2022-11-10 PROCEDURE — 77067 SCR MAMMO BI INCL CAD: CPT

## 2022-11-15 ENCOUNTER — OFFICE VISIT (OUTPATIENT)
Dept: ALLERGY | Facility: CLINIC | Age: 72
End: 2022-11-15
Payer: MEDICARE

## 2022-11-15 ENCOUNTER — LAB (OUTPATIENT)
Dept: LAB | Facility: CLINIC | Age: 72
End: 2022-11-15
Payer: MEDICARE

## 2022-11-15 VITALS — BODY MASS INDEX: 23.49 KG/M2 | WEIGHT: 154.5 LBS | TEMPERATURE: 98.8 F | HEART RATE: 112 BPM

## 2022-11-15 DIAGNOSIS — T63.481A ALLERGIC REACTION TO HYMENOPTERA VENOM: ICD-10-CM

## 2022-11-15 DIAGNOSIS — L50.9 URTICARIA: Primary | ICD-10-CM

## 2022-11-15 DIAGNOSIS — T61.781A ALLERGIC REACTION TO SHELLFISH: ICD-10-CM

## 2022-11-15 DIAGNOSIS — L50.9 URTICARIA: ICD-10-CM

## 2022-11-15 PROCEDURE — 86003 ALLG SPEC IGE CRUDE XTRC EA: CPT | Mod: 59

## 2022-11-15 PROCEDURE — 36415 COLL VENOUS BLD VENIPUNCTURE: CPT

## 2022-11-15 PROCEDURE — 95004 PERQ TESTS W/ALRGNC XTRCS: CPT | Performed by: INTERNAL MEDICINE

## 2022-11-15 PROCEDURE — 86003 ALLG SPEC IGE CRUDE XTRC EA: CPT

## 2022-11-15 PROCEDURE — 99204 OFFICE O/P NEW MOD 45 MIN: CPT | Mod: 25 | Performed by: INTERNAL MEDICINE

## 2022-11-15 ASSESSMENT — ENCOUNTER SYMPTOMS
EYE REDNESS: 0
DIARRHEA: 0
CHEST TIGHTNESS: 0
HEADACHES: 0
JOINT SWELLING: 0
SINUS PRESSURE: 0
VOMITING: 0
NAUSEA: 0
RHINORRHEA: 0
SHORTNESS OF BREATH: 0
WHEEZING: 0
FACIAL SWELLING: 0
ACTIVITY CHANGE: 0
MYALGIAS: 0
ARTHRALGIAS: 0
EYE DISCHARGE: 0
CHILLS: 0
FEVER: 0
ADENOPATHY: 0
EYE ITCHING: 0
COUGH: 0

## 2022-11-15 NOTE — LETTER
11/15/2022         RE: Paloma Fitch  [address]  [facility] [address]        Dear Colleague,    Thank you for referring your patient, Paloma Fitch, to the [facility] [place]. Please see a copy of my visit note below.    Paloma Fitch was seen in the Allergy Clinic at [facility].    Paloma Fitch is a 72 year old female being seen today for evaluation of shellfish allergy.  30 years ago she describes how she had a reaction eating shrimp.  She was in [place] ate shrimp and then went on a run and then developed hives.  She avoids shellfish at this time but can tolerate clams and Octopus.  She describes how she received allergy shots to shellfish 30 years ago which is an atypical therapy.  She is planning to travel to [place] and [place] soon and would like to find out if she is truly allergic.  She does have an EpiPen.    Past Medical History:   Diagnosis Date     Colonic polyp 2012    adenomatous, fu 5 years; fu 9/18 one hyper polyp     Dizziness 05/2020    est echo nl     Gross hematuria 2016    ct and cysto via Dr. Watt     Intermittent asthma      Palpitations 12/2018    echo nl     SVT (supraventricular tachycardia) (H) 1994    successful catheter ablation done [facility]     No family history on file.  Past Surgical History:   Procedure Laterality Date     CARDIAC SURGERY      ablation for SVT/WPW     COLONOSCOPY N/A 9/20/2018    Procedure: COMBINED COLONOSCOPY, SINGLE OR MULTIPLE BIOPSY/POLYPECTOMY BY BIOPSY;  colonoscopy;  Surgeon: Tre Carreon MD;  Location:  GI     ENT SURGERY      wisdom teeth, mandibular mass excision     wpw ablation surgery  41       ENVIRONMENTAL HISTORY:   Pets inside the house include None.  Do you smoke cigarettes or other recreational drugs? No There is/are 0 smokers living in the house. The house does not have a damp basement.     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      Review of Systems   Constitutional: Negative for activity change, chills and fever.   HENT: Negative for congestion, dental problem, ear pain, facial swelling, nosebleeds, postnasal drip, rhinorrhea, sinus pressure and sneezing.    Eyes: Negative for discharge, redness and itching.   Respiratory: Negative for cough, chest tightness, shortness of breath and wheezing.    Cardiovascular: Negative for chest pain.   Gastrointestinal: Negative for diarrhea, nausea and vomiting.   Musculoskeletal: Negative for arthralgias, joint swelling and myalgias.   Skin: Negative for rash.   Neurological: Negative for headaches.   Hematological: Negative for adenopathy.   Psychiatric/Behavioral: Negative for behavioral problems and self-injury.         Current Outpatient Medications:      EPINEPHrine (ANY BX GENERIC EQUIV) 0.3 MG/0.3ML injection 2-pack, INJECT 0.3 MLS(0.3 MG) INTO THE MUSCLE AS NEEDED FOR ANAPHYLAXIS, Disp: 0.6 mL, Rfl: 0     LORazepam (ATIVAN) 1 MG tablet, Take 1 tablet (1 mg) by mouth every 8 hours as needed for anxiety, Disp: 6 tablet, Rfl: 0  Allergies   Allergen Reactions     Iodine-131      Shellfish Allergy Hives         EXAM:   Pulse 112   Temp 98.8  F (37.1  C)   Wt 70.1 kg (154 lb 8 oz)   BMI 23.49 kg/m      Physical Exam    Constitutional:       General: She is not in acute distress.     Appearance: Normal appearance. She is not ill-appearing.   HENT:      Head: Normocephalic and atraumatic.        Mouth/Throat:      Mouth: Mucous membranes are moist.      Pharynx: Oropharynx is clear. No posterior oropharyngeal erythema.   Eyes:      General:         Right eye: No discharge.         Left eye: No discharge.   Cardiovascular:      Rate and Rhythm: Normal rate and regular rhythm.      Heart sounds: Normal heart sounds.   Pulmonary:      Effort: Pulmonary effort is normal.      Breath sounds: Normal breath sounds. No wheezing or rhonchi.    Skin:     General: Skin is warm.      Findings: No erythema or rash.   Neurological:      General: No focal deficit present.      Mental Status: She is alert. Mental status is at baseline.   Psychiatric:         Mood and Affect: Mood normal.         Behavior: Behavior normal.     WORKUP: Skin testing with mild positive results to clam and crab    NUTS/SHELLFISH ALLERGEN PERCUTANEOUS SKIN TESTING  [employer] nuts & shellfish 11/15/2022   Consent Y   Ordering Physician Dr. Ac   Interpreting Physician Dr. Ac   Testing Technician Konrad GALE   Location Arm   Time start:  2:28 PM   Time End:  2:43 PM   Positive Control: Histatrol*ALK 1 mg/ml 5/6   Negative Control: 50% Glycerin** [place] Nicki 0   Selection: Shellfish   Shrimp 1:20 (W/F in millimeters) 0   Lobster 1:20 (W/F in millimeters) 0   Crab 1:20 (W/F in millimeters) 3/3   Clam 1:20 (W/F in millimeters) 4/4   Oyster 1:20 (W/F in millimeters) 0   Scallops 1:20 (W/F in millimeters) 0      Skin testing verbal consent was obtained  ASSESSMENT/PLAN:  Paloma Fitch is a 72 year old female seen today for shellfish allergy evaluation.  Her reaction was over 30 years ago and developed hives after exercising after eating shrimp.  Today skin testing was slightly positive to clam which she tolerates.  Minimally reactive to crab.  We will assess results with blood testing also.  Likely will be able to tolerate all forms of shellfish.    She also has a history of urticaria to a yellowjacket.  We will see if she still allergic to venoms by blood testing.    1. Will check blood tests for venoms and shellfish  2. Carry epi until results are back    Follow-up will be determined based on blood testing    Thank you for allowing me to participate in the care of Paloma Fitch.      I spent 30 minutes on the date of the encounter doing chart review, history and exam, documentation and further coordination as noted above exclusive of separately reported  interpretations    Edwardo Ac MD  Allergy/Immunology  [facility]        Again, thank you for allowing me to participate in the care of your patient.        Sincerely,        Edwardo Ac MD

## 2022-11-15 NOTE — PROGRESS NOTES
Paloma Fitch was seen in the Allergy Clinic at [facility].    Paloma Fitch is a 72 year old female being seen today for evaluation of shellfish allergy.  30 years ago she describes how she had a reaction eating shrimp.  She was in [place] ate shrimp and then went on a run and then developed hives.  She avoids shellfish at this time but can tolerate clams and Octopus.  She describes how she received allergy shots to shellfish 30 years ago which is an atypical therapy.  She is planning to travel to [place] and [place] soon and would like to find out if she is truly allergic.  She does have an EpiPen.    Past Medical History:   Diagnosis Date     Colonic polyp 2012    adenomatous, fu 5 years; fu 9/18 one hyper polyp     Dizziness 05/2020    est echo nl     Gross hematuria 2016    ct and cysto via Dr. Watt     Intermittent asthma      Palpitations 12/2018    echo nl     SVT (supraventricular tachycardia) (H) 1994    successful catheter ablation done [facility]     No family history on file.  Past Surgical History:   Procedure Laterality Date     CARDIAC SURGERY      ablation for SVT/WPW     COLONOSCOPY N/A 9/20/2018    Procedure: COMBINED COLONOSCOPY, SINGLE OR MULTIPLE BIOPSY/POLYPECTOMY BY BIOPSY;  colonoscopy;  Surgeon: Tre Carreon MD;  Location:  GI     ENT SURGERY      wisdom teeth, mandibular mass excision     wpw ablation surgery  41       ENVIRONMENTAL HISTORY:   Pets inside the house include None.  Do you smoke cigarettes or other recreational drugs? No There is/are 0 smokers living in the house. The house does not have a damp basement.     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      Review of Systems   Constitutional: Negative for activity change, chills and fever.   HENT: Negative for congestion, dental problem, ear pain, facial swelling, nosebleeds, postnasal drip, rhinorrhea, sinus pressure  and sneezing.    Eyes: Negative for discharge, redness and itching.   Respiratory: Negative for cough, chest tightness, shortness of breath and wheezing.    Cardiovascular: Negative for chest pain.   Gastrointestinal: Negative for diarrhea, nausea and vomiting.   Musculoskeletal: Negative for arthralgias, joint swelling and myalgias.   Skin: Negative for rash.   Neurological: Negative for headaches.   Hematological: Negative for adenopathy.   Psychiatric/Behavioral: Negative for behavioral problems and self-injury.         Current Outpatient Medications:      EPINEPHrine (ANY BX GENERIC EQUIV) 0.3 MG/0.3ML injection 2-pack, INJECT 0.3 MLS(0.3 MG) INTO THE MUSCLE AS NEEDED FOR ANAPHYLAXIS, Disp: 0.6 mL, Rfl: 0     LORazepam (ATIVAN) 1 MG tablet, Take 1 tablet (1 mg) by mouth every 8 hours as needed for anxiety, Disp: 6 tablet, Rfl: 0  Allergies   Allergen Reactions     Iodine-131      Shellfish Allergy Hives         EXAM:   Pulse 112   Temp 98.8  F (37.1  C)   Wt 70.1 kg (154 lb 8 oz)   BMI 23.49 kg/m      Physical Exam    Constitutional:       General: She is not in acute distress.     Appearance: Normal appearance. She is not ill-appearing.   HENT:      Head: Normocephalic and atraumatic.        Mouth/Throat:      Mouth: Mucous membranes are moist.      Pharynx: Oropharynx is clear. No posterior oropharyngeal erythema.   Eyes:      General:         Right eye: No discharge.         Left eye: No discharge.   Cardiovascular:      Rate and Rhythm: Normal rate and regular rhythm.      Heart sounds: Normal heart sounds.   Pulmonary:      Effort: Pulmonary effort is normal.      Breath sounds: Normal breath sounds. No wheezing or rhonchi.   Skin:     General: Skin is warm.      Findings: No erythema or rash.   Neurological:      General: No focal deficit present.      Mental Status: She is alert. Mental status is at baseline.   Psychiatric:         Mood and Affect: Mood normal.         Behavior: Behavior normal.      WORKUP: Skin testing with mild positive results to clam and crab    NUTS/SHELLFISH ALLERGEN PERCUTANEOUS SKIN TESTING  [employer] nuts & shellfish 11/15/2022   Consent Y   Ordering Physician Dr. Ac   Interpreting Physician Dr. Ac   Testing Technician Konrad GALE   Location Arm   Time start:  2:28 PM   Time End:  2:43 PM   Positive Control: Histatrol*ALK 1 mg/ml 5/6   Negative Control: 50% Glycerin** [place] Nicki 0   Selection: Shellfish   Shrimp 1:20 (W/F in millimeters) 0   Lobster 1:20 (W/F in millimeters) 0   Crab 1:20 (W/F in millimeters) 3/3   Clam 1:20 (W/F in millimeters) 4/4   Oyster 1:20 (W/F in millimeters) 0   Scallops 1:20 (W/F in millimeters) 0      Skin testing verbal consent was obtained  ASSESSMENT/PLAN:  Paloma Fitch is a 72 year old female seen today for shellfish allergy evaluation.  Her reaction was over 30 years ago and developed hives after exercising after eating shrimp.  Today skin testing was slightly positive to clam which she tolerates.  Minimally reactive to crab.  We will assess results with blood testing also.  Likely will be able to tolerate all forms of shellfish.    She also has a history of urticaria to a yellowjacket.  We will see if she still allergic to venoms by blood testing.    1. Will check blood tests for venoms and shellfish  2. Carry epi until results are back    Follow-up will be determined based on blood testing    Thank you for allowing me to participate in the care of Paloma Fitch.      I spent 30 minutes on the date of the encounter doing chart review, history and exam, documentation and further coordination as noted above exclusive of separately reported interpretations    Edwardo Ac MD  Allergy/Immunology  [facility]

## 2022-11-15 NOTE — PATIENT INSTRUCTIONS
Will check blood tests for venoms and shellfish  Carry epi until results are back      FOOD ALLERGEN PERCUTANEOUS SKIN TESTING  [employer]  11/15/2022   Shrimp 1:20 (W/F in millimeters) 0   Lobster 1:20 (W/F in millimeters) 0   Crab 1:20 (W/F in millimeters) 3/3   Clam 1:20 (W/F in millimeters) 4/4   Oyster 1:20 (W/F in millimeters) 0   Scallops 1:20 (W/F in millimeters) 0

## 2022-11-17 LAB
CLAM IGE QN: 0.6 KU(A)/L
CRAB IGE QN: 1.91 KU(A)/L
HONEY BEE IGE QN: <0.1 KU(A)/L
LOBSTER IGE QN: 0.45 KU(A)/L
OYSTER IGE QN: <0.1 KU(A)/L
PAPER WASP IGE QN: 3.78 KU(A)/L
SCALLOP IGE QN: 0.46 KU(A)/L
SHRIMP IGE QN: 11 KU(A)/L
WHITEFACED HORNET IGE QN: 4.68 KU(A)/L
YELLOW HORNET IGE QN: 7.18 KU(A)/L
YELLOW JACKET IGE QN: 2.8 KU(A)/L

## 2022-11-18 ENCOUNTER — MYC MEDICAL ADVICE (OUTPATIENT)
Dept: ALLERGY | Facility: CLINIC | Age: 72
End: 2022-11-18

## 2022-12-15 ENCOUNTER — OFFICE VISIT (OUTPATIENT)
Dept: ALLERGY | Facility: CLINIC | Age: 72
End: 2022-12-15
Payer: MEDICARE

## 2022-12-15 VITALS
BODY MASS INDEX: 23.5 KG/M2 | DIASTOLIC BLOOD PRESSURE: 85 MMHG | OXYGEN SATURATION: 100 % | WEIGHT: 154.54 LBS | RESPIRATION RATE: 18 BRPM | SYSTOLIC BLOOD PRESSURE: 125 MMHG | HEART RATE: 80 BPM

## 2022-12-15 DIAGNOSIS — T63.481A ALLERGIC REACTION TO HYMENOPTERA VENOM: ICD-10-CM

## 2022-12-15 DIAGNOSIS — T61.781A ALLERGIC REACTION TO SHELLFISH: Primary | ICD-10-CM

## 2022-12-15 PROCEDURE — 95076 INGEST CHALLENGE INI 120 MIN: CPT | Performed by: INTERNAL MEDICINE

## 2022-12-15 PROCEDURE — 99213 OFFICE O/P EST LOW 20 MIN: CPT | Mod: 25 | Performed by: INTERNAL MEDICINE

## 2022-12-15 NOTE — PROGRESS NOTES
12/15/22 1324   Cycle 1   Time #1 1324   Dose #1 1g  (Lobster)   Comments Start   Cycle 2   Time #2 1339   Dose #2 5g  (lobster)   Symptoms/Reaction None   Comments VS at 1355: pulse: 89, /80 SpO2 99%   Cycle 3   Time #3 1400   Dose #3 10g  (Lobster)   Symptoms/Reaction None   Comments Test continued   Cycle 4   Time #4 1415   Dose #4 20g  (Lobster)   Symptoms/Reaction None   Comments VS at 1430: pulse 78, 130/80, SpO2 98%

## 2022-12-15 NOTE — LETTER
12/15/2022         RE: Paloma Fitch  [address]  [facility] [address]        Dear Colleague,    Thank you for referring your patient, Paloma Fitch, to the [facility] SPECIALTY [facility]. Please see a copy of my visit note below.    Paloma Fitch was seen in the Allergy Clinic at [facility].    Paloma Fitch is a 72 year old female being seen today for ongoing evaluation of shellfish allergy.  30 years ago she ate shrimp and then went on a run and developed hives.  She can eat clams and Octopus.    Skin testing was only partially positive to crab and clam with small results.  Shrimp skin testing was negative.  Blood testing then took place and was positive to most shellfish.  Results pasted below.  Small reaction to lobster but more elevated to shrimp.  Also elevated to venoms.    Since the last visit the patient has been feeling fine.  She is here for a food challenge to lobster.     Latest Reference Range & Units 11/15/22 16:06   Allergen Crab <0.10 KU(A)/L 1.91 (H)   Allergen Lobster <0.10 KU(A)/L 0.45 (H)   Allergen paper wasp IgE <0.10 KU(A)/L 3.78 (H)   Allergen Scallop <0.10 KU(A)/L 0.46 (H)   Allergen Shrimp <0.10 KU(A)/L 11.00 (H)   Allergen Clam <0.10 KU(A)/L 0.60 (H)   Allergen, Common Wasp (Yellow Jacket) <0.10 KU(A)/L 2.80 (H)   Allergen, Honey Bee <0.10 KU(A)/L <0.10   Allergen Oyster <0.10 KU(A)/L <0.10   Allergen, White Faced Hornet <0.10 KU(A)/L 4.68 (H)   Allergen, Yellow Hornet <0.10 KU(A)/L 7.18 (H)   (H): Data is abnormally high    Past Medical History:   Diagnosis Date     Colonic polyp 2012    adenomatous, fu 5 years; fu 9/18 one hyper polyp     Dizziness 05/2020    est echo nl     Gross hematuria 2016    ct and cysto via Dr. Alysa     Intermittent asthma      Palpitations 12/2018    echo nl     SVT (supraventricular tachycardia) (H) 1994    successful catheter ablation done [facility] of [facility]     History reviewed. No  pertinent family history.  Past Surgical History:   Procedure Laterality Date     CARDIAC SURGERY      ablation for SVT/WPW     COLONOSCOPY N/A 9/20/2018    Procedure: COMBINED COLONOSCOPY, SINGLE OR MULTIPLE BIOPSY/POLYPECTOMY BY BIOPSY;  colonoscopy;  Surgeon: Tre Carreon MD;  Location:  GI     ENT SURGERY      wisdom teeth, mandibular mass excision     wpw ablation surgery  41         Current Outpatient Medications:      LORazepam (ATIVAN) 1 MG tablet, Take 1 tablet (1 mg) by mouth every 8 hours as needed for anxiety, Disp: 6 tablet, Rfl: 0     EPINEPHrine (ANY BX GENERIC EQUIV) 0.3 MG/0.3ML injection 2-pack, INJECT 0.3 MLS(0.3 MG) INTO THE MUSCLE AS NEEDED FOR ANAPHYLAXIS, Disp: 0.6 mL, Rfl: 0  Allergies   Allergen Reactions     Iodine-131      Shellfish Allergy Hives         EXAM:   BP (!) 168/92   Pulse 116   Wt 70.1 kg (154 lb 8.7 oz)   SpO2 100%   BMI 23.50 kg/m      Constitutional:       General: She is not in acute distress.     Appearance: Normal appearance. She is not ill-appearing.   HENT:      Head: Normocephalic and atraumatic.      Mouth/Throat:      Mouth: Mucous membranes are moist.      Pharynx: Oropharynx is clear. No posterior oropharyngeal erythema.   Eyes:      General:         Right eye: No discharge.         Left eye: No discharge.   Cardiovascular:      Rate and Rhythm: Normal rate and regular rhythm.      Heart sounds: Normal heart sounds.   Pulmonary:      Effort: Pulmonary effort is normal.      Breath sounds: Normal breath sounds. No wheezing or rhonchi.   Skin:     General: Skin is warm.      Findings: No erythema or rash.   Neurological:      General: No focal deficit present.      Mental Status: She is alert. Mental status is at baseline.   Psychiatric:         Mood and Affect: Mood normal.         Behavior: Behavior normal.     WORKUP:  Food challenge to lobster. Refer to flowsheet.  Labs was given and a 1 g, 5 g, 10 g and then 20 g amount.  Vital signs were  assessed and stable throughout.  Exam findings were stable with no changes.  Total duration of the food challenge was 120 minutes.  Negative food challenge.      ASSESSMENT/PLAN:  Paloma Fitch is a 72 year old female seen today for ongoing evaluation of shellfish allergy.  Also has a venom allergy.  Blood testing results are pasted in the HPI above.  Skin testing was only mildly positive to crab and clam.  She is here for the lobster challenge.  Considering a scallop challenge in the future also.    Recommend continued avoidance of shellfish except lobster.  Recommend to continue to carry an EpiPen due to the venom allergy and shellfish allergy.    She is comfortable trying scallops at home.  Recommended to do crab in the office if interested.    Will continue to avoid venoms as much as possible.    Follow-up in 1 month      Thank you for allowing me to participate in the care of Paloma Fitch.      I spent 25 minutes on the date of the encounter doing chart review, history and exam, documentation and further coordination as noted above exclusive of separately reported interpretations    Edwardo Ac MD  Allergy/Immunology  [facility]       12/15/22 1324   Cycle 1   Time #1 1324   Dose #1 1g  (Lobster)   Comments Start   Cycle 2   Time #2 1339   Dose #2 5g  (lobster)   Symptoms/Reaction None   Comments VS at 1355: pulse: 89, /80 SpO2 99%   Cycle 3   Time #3 1400   Dose #3 10g  (Lobster)   Symptoms/Reaction None   Comments Test continued   Cycle 4   Time #4 1415   Dose #4 20g  (Lobster)   Symptoms/Reaction None   Comments VS at 1430: pulse 78, 130/80, SpO2 98%       Again, thank you for allowing me to participate in the care of your patient.        Sincerely,        Edwardo Ac MD

## 2022-12-15 NOTE — PATIENT INSTRUCTIONS
Allergy Staff Appt Hours Shot Hours Location         Physician   Edwardo Ac MD      Support Staff   ELAINA Pacheco, RN   Kadeem KHALIL, CARLIE PICKARD LPN          Mondays  Not available until January/2023 Wednesdays  Close    Tuesdays Thursdays and Fridays:  [place] 7-5                    [place]     Tuesdays: 7:40-3:20      Fridays: 7:40-4:20                [facility]  [address]  [place] [address]  Appt Line: [phone]    Pulmonary Function Scheduling:  [place]: [phone]         Questions about cost of your care?  For questions about your cost of your visit, procedure, lab or imaging contact: [employer] Price Line [phone] or visit: [email or web address]    Important Scheduling Information  Appointments for skin testing: Appointment will last approximately 45 minutes.  Please call the appointment line for your clinic to schedule.  Discontinue oral antihistamines 7 days prior to the appointment.  Discontinue nasal and ocular antihistamines 4 days prior to appointment.    Appointments for challenges (oral food challenge, penicillin testing, aspirin desensitization) If your provider instructs you to that this additional testing is indicated, it will need to be scheduled directly through the allergy department.  Please contact them via [employer] or by calling the clinic and asking to speak with the allergy team.  They will provide additional information and instructions for the appointment.  Discontinue oral antihistamines 7 days prior to the appointment.  Discontinue nasal and ocular antihistamines 4 days prior to the appointment.    Thank you for trusting us with your care. Please feel free to contact us with any questions or concerns you may have.

## 2022-12-15 NOTE — PROGRESS NOTES
Paloma Fitch was seen in the Allergy Clinic at [facility].    Paloma Fitch is a 72 year old female being seen today for ongoing evaluation of shellfish allergy.  30 years ago she ate shrimp and then went on a run and developed hives.  She can eat clams and Octopus.    Skin testing was only partially positive to crab and clam with small results.  Shrimp skin testing was negative.  Blood testing then took place and was positive to most shellfish.  Results pasted below.  Small reaction to lobster but more elevated to shrimp.  Also elevated to venoms.    Since the last visit the patient has been feeling fine.  She is here for a food challenge to lobster.     Latest Reference Range & Units 11/15/22 16:06   Allergen Crab <0.10 KU(A)/L 1.91 (H)   Allergen Lobster <0.10 KU(A)/L 0.45 (H)   Allergen paper wasp IgE <0.10 KU(A)/L 3.78 (H)   Allergen Scallop <0.10 KU(A)/L 0.46 (H)   Allergen Shrimp <0.10 KU(A)/L 11.00 (H)   Allergen Clam <0.10 KU(A)/L 0.60 (H)   Allergen, Common Wasp (Yellow Jacket) <0.10 KU(A)/L 2.80 (H)   Allergen, Honey Bee <0.10 KU(A)/L <0.10   Allergen Oyster <0.10 KU(A)/L <0.10   Allergen, White Faced Hornet <0.10 KU(A)/L 4.68 (H)   Allergen, Yellow Hornet <0.10 KU(A)/L 7.18 (H)   (H): Data is abnormally high    Past Medical History:   Diagnosis Date     Colonic polyp 2012    adenomatous, fu 5 years; fu 9/18 one hyper polyp     Dizziness 05/2020    est echo nl     Gross hematuria 2016    ct and cysto via Dr. Watt     Intermittent asthma      Palpitations 12/2018    echo nl     SVT (supraventricular tachycardia) (H) 1994    successful catheter ablation done u of m     History reviewed. No pertinent family history.  Past Surgical History:   Procedure Laterality Date     CARDIAC SURGERY      ablation for SVT/WPW     COLONOSCOPY N/A 9/20/2018    Procedure: COMBINED COLONOSCOPY, SINGLE OR MULTIPLE BIOPSY/POLYPECTOMY BY BIOPSY;  colonoscopy;  Surgeon: Tre Carreon  MD Earline;  Location:  GI     ENT SURGERY      wisdom teeth, mandibular mass excision     wpw ablation surgery  41         Current Outpatient Medications:      LORazepam (ATIVAN) 1 MG tablet, Take 1 tablet (1 mg) by mouth every 8 hours as needed for anxiety, Disp: 6 tablet, Rfl: 0     EPINEPHrine (ANY BX GENERIC EQUIV) 0.3 MG/0.3ML injection 2-pack, INJECT 0.3 MLS(0.3 MG) INTO THE MUSCLE AS NEEDED FOR ANAPHYLAXIS, Disp: 0.6 mL, Rfl: 0  Allergies   Allergen Reactions     Iodine-131      Shellfish Allergy Hives         EXAM:   BP (!) 168/92   Pulse 116   Wt 70.1 kg (154 lb 8.7 oz)   SpO2 100%   BMI 23.50 kg/m      Constitutional:       General: She is not in acute distress.     Appearance: Normal appearance. She is not ill-appearing.   HENT:      Head: Normocephalic and atraumatic.      Mouth/Throat:      Mouth: Mucous membranes are moist.      Pharynx: Oropharynx is clear. No posterior oropharyngeal erythema.   Eyes:      General:         Right eye: No discharge.         Left eye: No discharge.   Cardiovascular:      Rate and Rhythm: Normal rate and regular rhythm.      Heart sounds: Normal heart sounds.   Pulmonary:      Effort: Pulmonary effort is normal.      Breath sounds: Normal breath sounds. No wheezing or rhonchi.   Skin:     General: Skin is warm.      Findings: No erythema or rash.   Neurological:      General: No focal deficit present.      Mental Status: She is alert. Mental status is at baseline.   Psychiatric:         Mood and Affect: Mood normal.         Behavior: Behavior normal.     WORKUP:  Food challenge to lobster. Refer to flowsheet.  Labs was given and a 1 g, 5 g, 10 g and then 20 g amount.  Vital signs were assessed and stable throughout.  Exam findings were stable with no changes.  Total duration of the food challenge was 120 minutes.  Negative food challenge.      ASSESSMENT/PLAN:  Paloma Fitch is a 72 year old female seen today for ongoing evaluation of shellfish allergy.   Also has a venom allergy.  Blood testing results are pasted in the HPI above.  Skin testing was only mildly positive to crab and clam.  She is here for the lobster challenge.  Considering a scallop challenge in the future also.    Recommend continued avoidance of shellfish except lobster.  Recommend to continue to carry an EpiPen due to the venom allergy and shellfish allergy.    She is comfortable trying scallops at home.  Recommended to do crab in the office if interested.    Will continue to avoid venoms as much as possible.    Follow-up in 1 month      Thank you for allowing me to participate in the care of Paloma DESTINEY Fitch.      I spent 25 minutes on the date of the encounter doing chart review, history and exam, documentation and further coordination as noted above exclusive of separately reported interpretations    Edwardo Ac MD  Allergy/Immunology  [facility]

## 2023-01-05 ENCOUNTER — TELEPHONE (OUTPATIENT)
Dept: FAMILY MEDICINE | Facility: CLINIC | Age: 73
End: 2023-01-05

## 2023-01-05 DIAGNOSIS — U07.1 INFECTION DUE TO 2019 NOVEL CORONAVIRUS: Primary | ICD-10-CM

## 2023-01-05 NOTE — TELEPHONE ENCOUNTER
RN COVID TREATMENT VISIT  01/05/23    Paloma Fitch  72 year old  Current weight? 150 lbs    Has the patient been seen by a primary care provider at an [facility] or [facility] within the past two years? Yes.   Have you been in close proximity to/do you have a known exposure to a person with a confirmed case of influenza? No.     Date of positive COVID test (PCR or at home)?  01/05/2023    Current COVID symptoms: cough, fatigue, headache and congestion or runny nose    Date COVID symptoms began:     Do you have any of the following conditions that place you at risk of being very sick from COVID-19? 65 years or older and chronic lung disease    Is patient eligible to continue? Yes, established patient, 12 years or older weighing at least 88.2 lbs, who has COVID symptoms that started in the past 5 days and is at risk for being very sick from COVID-19.       Have you received monoclonal antibodies or oral antiviral medications since testing positive to COVID-19? No    Are you currently hospitalized for COVID-19? No    Do you have a history of hepatitis? No    Are you currently pregnant or nursing? No    Do you have a clinically significant hypersensitivity to nirmatrelvir, ritonavir, or molnupiravir? No    Do you have any history of severe renal impairment (eGFR < 30mL/min)? No    Do you have any history of hepatic impairment or abnormalities (e.g. hepatic panel, ALT, AST, ALK Phos, bilirubin)? No    Have you had a coronary stent placed in the previous 6 months? No    Is patient eligible to continue?   Yes, patient meets all eligibility requirements for the RN COVID treatment (as denoted by all no responses above).     Current Outpatient Medications   Medication Sig Dispense Refill     EPINEPHrine (ANY BX GENERIC EQUIV) 0.3 MG/0.3ML injection 2-pack INJECT 0.3 MLS(0.3 MG) INTO THE MUSCLE AS NEEDED FOR ANAPHYLAXIS 0.6 mL 0     LORazepam (ATIVAN) 1 MG tablet Take 1 tablet (1 mg) by  mouth every 8 hours as needed for anxiety 6 tablet 0       Medications from List 1 of the standing order (on medications that exclude the use of Paxlovid) that patient is taking: NONE. Is patient taking Meg's Wort? No  Is patient taking Meg's Wort or any meds from List 1? No.   Medications from List 2 of the standing order (on meds that provider needs to adjust) that patient is taking: NONE. Is patient on any of the meds from List 2? No.   Medications from List 3 of standing order (on meds that a RN needs to adjust) that patient is taking: NONE. Is patient on any meds from List 3? No.   In order of efficacy, Paxlovid has an approximate 90% reduction in hospitalization. Paxlovid can possibly cause altered sense of taste, diarrhea (loose, watery stools), high blood pressure, muscle aches.  The other option is molnupiravir which has an approximate 30% reduction in hospitalization. Molnupirarivr can possibly cause diarrhea (loose, watery stools), nausea (feeling sick to your stomach), dizziness, headaches.    Which treatment option does the patient prefer?   Paxlovid.   Lab Results   Component Value Date    GFRESTIMATED 69 08/02/2022       Was last eGFR reduced? No, eGFR 60 or greater/ No Result on record. Patient can receive the normal renal function dose. Paxlovid Rx sent to [place] [facility]       Temporary change to home medications: None    All medication adjustments (holds, etc) were discussed with the patient and patient was asked to repeat back (teachback) their med adjustment.  Did patient understand med adjustment? No medication adjustments needed.          Reviewed the following instructions with the patient:    Paxlovid (nimatrelvir and ritonavir)    How it works  Two medicines (nirmatrelvir and ritonavir) are taken together. They stop the virus from growing. Less amount of virus is easier for your body to fight.    How to take    Medicine comes in a daily container with both medicine tablets. Take  by mouth twice daily (once in the morning, once at night) for 5 days.    The number of tablets to take varies by patient.    Don't chew or break capsules. Swallow whole.    When to take  Take as soon as possible after positive COVID-19 test result, and within 5 days of your first symptoms.    Possible side effects  Can cause altered sense of taste, diarrhea (loose, watery stools), high blood pressure, muscle aches.    Amador Varma RN

## 2023-01-05 NOTE — TELEPHONE ENCOUNTER
Pt reported positive COVID-19 result today with onset of symptoms 01/03/2023. Pt recently returned from [place].     Pt states she only takes a multivitamin. No other medications at this time.    Rx for Paxlovid  approved per COVID-19 RN protocol.     Pt had to rescheduled physical appt with doctor Downing. She scheduled OV  01/19 at 10:30 for medication follow up but wants to know if it can be changed to a wellness exam.    Ok to change med check appt to wellness exam 01/09 ?    Pt would need a call back with providers response.

## 2023-01-05 NOTE — PATIENT INSTRUCTIONS
COVID-19 Outpatient Treatments  Your care team can help you find the best treatments for COVID-19. Talk to a health care provider or refer to the FDA medicine fact sheets below.    Important: You can't have Paxlovid or molnupiravir if you're starting the medicine more than 5 days after your symptoms have started.  Paxlovid: https://www.fda.gov/media/395619/download  Molnupiravir (Lagevrio): https://www.fda.gov/media/982621/download  Paxlovid (nimatrelvir and ritonavir)  How it works  Two medicines (nirmatrelvir and ritonavir) are taken together. They stop the virus from growing. Less amount of virus is easier for your body to fight.  Benefits  Lowers risk of a hospital stay or death from COVID-19.  How to take    Medicine comes in a daily container with both medicine tablets. Take by mouth twice daily (once in the morning, once at night) for 5 days.    The number of tablets to take varies by patient.    Don't chew or break capsules. Swallow whole.  When to take  Take as soon as possible after positive COVID-19 test result, and within 5 days of your first symptoms.  Who can take it  Patients must be 12 years or older, weigh at least 88 pounds, and have tested positive for COVID-19. Paxlovid is the preferred treatment for pregnant patients.  Possible side effects  Can cause altered sense of taste, diarrhea (loose, watery stools), high blood pressure, muscle aches.  Medicine conflicts    Some medicines may conflict with Paxlovid and may cause serious side effects.    Tell your care team about all the medicines you take, including prescription and over-the-counter medicines, vitamins, and herbal supplements.    Your care team will review your medicines to make sure that you can safely take Paxlovid.  Cautions    Paxlovid is not advised for patients with severe kidney or liver disease. If you have kidney or liver problems, the dose may need to be changed.    If you're pregnant or breastfeeding, talk to your care team  about your options.    If you take hormonal birth control (such as the Pill), then you or your partner should also use a non-hormonal form of birth control (such as a condom). Keep doing this for 1 menstrual cycle after your last dose of Paxlovid.  Molnupiravir (Lagevrio)  How it works  Stops the virus from growing. Less amount of virus is easier for your body to fight.  Benefits  Lowers risk of a hospital stay or death from COVID-19.  How to take  Take 4 capsules by mouth every 12 hours (4 in the morning and 4 at night) for 5 days. Don't chew or break capsules. Swallow whole.  When to take  Take as soon as possible after positive COVID-19 test result, and within 5 days of your first symptoms.  Who can take it  Patients must be 18 years or older and have tested positive for COVID-19.  Possible side effects  Diarrhea (loose, watery stools), nausea (feeling sick to your stomach), dizziness, headaches.  Medicine conflicts  Right now, there are no known conflicts with other drugs. But tell your care team about all medicines you take.  Cautions    This medicine is not advised for patients who are pregnant.    If you are someone who could become pregnant, use trusted birth control until 4 days after your last dose of molnupiravir.    If your partner could become pregnant, you should use trusted birth control until 3 months after your last dose of molnupiravir.  For informational purposes only. Not to replace the advice of your health care provider. Copyright   2022 [facility]. All rights reserved. Clinically reviewed by Lorena Roa, PharmD, BCACP. [facility] [id number] - REV 12/22.

## 2023-01-06 NOTE — TELEPHONE ENCOUNTER
Writer changed patient's upcoming appointment to wellness exam with PCP approval, called patient to notify of the change being approved.    Patient is appreciative of follow up call.    No further questions or concerns at this time.    Signing encounter.    Janie Jean RN  [facility]

## 2023-01-12 ENCOUNTER — TELEPHONE (OUTPATIENT)
Dept: ALLERGY | Facility: CLINIC | Age: 73
End: 2023-01-12

## 2023-01-12 NOTE — TELEPHONE ENCOUNTER
Called and left voicemail with patient to callback regarding reschedule of her oral challenge.     Pao Mehta, CANDYN, RN

## 2023-01-12 NOTE — TELEPHONE ENCOUNTER
Received callback from patient to discuss her need for reschedule. Patient was able to get rescheduled and had no additional questions at this time.     Pao Mehta, BSN, RN

## 2023-01-12 NOTE — TELEPHONE ENCOUNTER
[facility] Call Center    Phone Message    May a detailed message be left on voicemail: yes     Reason for Call: Other: Pt needs to move her Crab Oral Challenge on 1/19 to the week of 1/23 or in March. Okay to leave a message.     Action Taken: Message routed to:  Other: [facility] All[facility]    Travel Screening: Not Applicable

## 2023-01-16 ENCOUNTER — TELEPHONE (OUTPATIENT)
Dept: FAMILY MEDICINE | Facility: CLINIC | Age: 73
End: 2023-01-16

## 2023-01-16 NOTE — TELEPHONE ENCOUNTER
I do not have any other recommendations except to call if she worsens.    With respect to her visit we really should not have her come and for 10 days.  Please reschedule sometime in January, okay to use an 8 AM spot for virtual visit spot for in person.    Thank you

## 2023-01-16 NOTE — TELEPHONE ENCOUNTER
Patient Contact    Attempt # 1    Was call answered?  Yes. Writer relayed PCP's message below, patient expressed verbal understanding.    Writer made appointment, patient requested no 8am appointments:  1/24/2023 10:00 AM (Arrive by 9:40 AM) Jonn Downing MD [facility]       Griselda Stiles RN  -[facility]

## 2023-01-16 NOTE — TELEPHONE ENCOUNTER
"Patient calling with concerns of COVID symptoms again.     Symptoms initially began on 1/3/2023  COVID positive on 1/5/2023  Completed Paxlovid on 1/10/2023  Tested negative on 1/11/2023  Reocurring \"cold symptoms\" and tested positive on 1/15/2023    Patient states:  \"I really don't feel terrible.\"   \"It's alright. I'm not that sick.\"     Patient reports runny nose and cough.   Denies fever, trouble breathing, chest pain.     Denies influenza exposure.     O2 sat 99% on room air       This RN provided patient with isolation guidelines per CDC. Also reviewed red flag signs/symptoms and when to seek care. Advised rest and maintain fluid intake.   Patient verbalized understanding.       Patient wants to know if provider has any other recommendations.     Also, patient has her annual wellness visit scheduled for 1/19/2023.   Please advise if change to virtual or re-schedule? If re-schedule, please advise when patient can be rescheduled to.       Amie Kline, RN BSN MSN  [facility]        "

## 2023-01-24 ENCOUNTER — OFFICE VISIT (OUTPATIENT)
Dept: FAMILY MEDICINE | Facility: CLINIC | Age: 73
End: 2023-01-24
Payer: MEDICARE

## 2023-01-24 VITALS
HEART RATE: 113 BPM | BODY MASS INDEX: 22.88 KG/M2 | RESPIRATION RATE: 16 BRPM | HEIGHT: 68 IN | WEIGHT: 151 LBS | DIASTOLIC BLOOD PRESSURE: 78 MMHG | OXYGEN SATURATION: 97 % | TEMPERATURE: 97.7 F | SYSTOLIC BLOOD PRESSURE: 144 MMHG

## 2023-01-24 DIAGNOSIS — I47.10 SVT (SUPRAVENTRICULAR TACHYCARDIA) (H): ICD-10-CM

## 2023-01-24 DIAGNOSIS — Z00.00 MEDICARE ANNUAL WELLNESS VISIT, SUBSEQUENT: Primary | ICD-10-CM

## 2023-01-24 DIAGNOSIS — F41.9 ANXIETY: ICD-10-CM

## 2023-01-24 DIAGNOSIS — J45.20 INTERMITTENT ASTHMA, UNCOMPLICATED: ICD-10-CM

## 2023-01-24 DIAGNOSIS — K63.5 POLYP OF COLON, UNSPECIFIED PART OF COLON, UNSPECIFIED TYPE: ICD-10-CM

## 2023-01-24 DIAGNOSIS — R13.10 DYSPHAGIA, UNSPECIFIED TYPE: ICD-10-CM

## 2023-01-24 DIAGNOSIS — Z00.00 ENCOUNTER FOR MEDICARE ANNUAL WELLNESS EXAM: ICD-10-CM

## 2023-01-24 LAB
ALBUMIN SERPL BCG-MCNC: 4.2 G/DL (ref 3.5–5.2)
ALP SERPL-CCNC: 59 U/L (ref 35–104)
ALT SERPL W P-5'-P-CCNC: 16 U/L (ref 10–35)
ANION GAP SERPL CALCULATED.3IONS-SCNC: 13 MMOL/L (ref 7–15)
AST SERPL W P-5'-P-CCNC: 28 U/L (ref 10–35)
BILIRUB SERPL-MCNC: 0.4 MG/DL
BUN SERPL-MCNC: 13.9 MG/DL (ref 8–23)
CALCIUM SERPL-MCNC: 9.3 MG/DL (ref 8.8–10.2)
CHLORIDE SERPL-SCNC: 102 MMOL/L (ref 98–107)
CHOLEST SERPL-MCNC: 238 MG/DL
CREAT SERPL-MCNC: 0.78 MG/DL (ref 0.51–0.95)
DEPRECATED HCO3 PLAS-SCNC: 23 MMOL/L (ref 22–29)
ERYTHROCYTE [DISTWIDTH] IN BLOOD BY AUTOMATED COUNT: 12.9 % (ref 10–15)
GFR SERPL CREATININE-BSD FRML MDRD: 80 ML/MIN/1.73M2
GLUCOSE SERPL-MCNC: 117 MG/DL (ref 70–99)
HCT VFR BLD AUTO: 40 % (ref 35–47)
HDLC SERPL-MCNC: 80 MG/DL
HGB BLD-MCNC: 13.2 G/DL (ref 11.7–15.7)
LDLC SERPL CALC-MCNC: 135 MG/DL
MCH RBC QN AUTO: 29.6 PG (ref 26.5–33)
MCHC RBC AUTO-ENTMCNC: 33 G/DL (ref 31.5–36.5)
MCV RBC AUTO: 90 FL (ref 78–100)
NONHDLC SERPL-MCNC: 158 MG/DL
PLATELET # BLD AUTO: 365 10E3/UL (ref 150–450)
POTASSIUM SERPL-SCNC: 4 MMOL/L (ref 3.4–5.3)
PROT SERPL-MCNC: 7.4 G/DL (ref 6.4–8.3)
RBC # BLD AUTO: 4.46 10E6/UL (ref 3.8–5.2)
SODIUM SERPL-SCNC: 138 MMOL/L (ref 136–145)
TRIGL SERPL-MCNC: 116 MG/DL
WBC # BLD AUTO: 8.7 10E3/UL (ref 4–11)

## 2023-01-24 PROCEDURE — 36415 COLL VENOUS BLD VENIPUNCTURE: CPT | Performed by: INTERNAL MEDICINE

## 2023-01-24 PROCEDURE — 80061 LIPID PANEL: CPT | Performed by: INTERNAL MEDICINE

## 2023-01-24 PROCEDURE — 80053 COMPREHEN METABOLIC PANEL: CPT | Performed by: INTERNAL MEDICINE

## 2023-01-24 PROCEDURE — 99213 OFFICE O/P EST LOW 20 MIN: CPT | Mod: 25 | Performed by: INTERNAL MEDICINE

## 2023-01-24 PROCEDURE — 85027 COMPLETE CBC AUTOMATED: CPT | Performed by: INTERNAL MEDICINE

## 2023-01-24 PROCEDURE — G0439 PPPS, SUBSEQ VISIT: HCPCS | Performed by: INTERNAL MEDICINE

## 2023-01-24 RX ORDER — LORAZEPAM 1 MG/1
1 TABLET ORAL EVERY 8 HOURS PRN
Qty: 6 TABLET | Refills: 0 | Status: SHIPPED | OUTPATIENT
Start: 2023-01-24 | End: 2023-05-09

## 2023-01-24 ASSESSMENT — PAIN SCALES - GENERAL: PAINLEVEL: NO PAIN (0)

## 2023-01-24 ASSESSMENT — ASTHMA QUESTIONNAIRES
QUESTION_5 LAST FOUR WEEKS HOW WOULD YOU RATE YOUR ASTHMA CONTROL: COMPLETELY CONTROLLED
QUESTION_2 LAST FOUR WEEKS HOW OFTEN HAVE YOU HAD SHORTNESS OF BREATH: NOT AT ALL
ACT_TOTALSCORE: 25
QUESTION_4 LAST FOUR WEEKS HOW OFTEN HAVE YOU USED YOUR RESCUE INHALER OR NEBULIZER MEDICATION (SUCH AS ALBUTEROL): NOT AT ALL
QUESTION_1 LAST FOUR WEEKS HOW MUCH OF THE TIME DID YOUR ASTHMA KEEP YOU FROM GETTING AS MUCH DONE AT WORK, SCHOOL OR AT HOME: NONE OF THE TIME
QUESTION_3 LAST FOUR WEEKS HOW OFTEN DID YOUR ASTHMA SYMPTOMS (WHEEZING, COUGHING, SHORTNESS OF BREATH, CHEST TIGHTNESS OR PAIN) WAKE YOU UP AT NIGHT OR EARLIER THAN USUAL IN THE MORNING: NOT AT ALL
ACT_TOTALSCORE: 25

## 2023-01-24 ASSESSMENT — ACTIVITIES OF DAILY LIVING (ADL): CURRENT_FUNCTION: NO ASSISTANCE NEEDED

## 2023-01-24 NOTE — PROGRESS NOTES
SUBJECTIVE:   Paloma is a 72 year old who presents for Preventive Visit.    Overall she is doing well but has a couple of issues.  Her blood pressure at home is very good but here its been high.  She does workout regularly.    Sore sometimes she notes that when she drinks milk it can stick in her throat.  It does not do it with food or other liquids.  She had this many years ago and an upper endoscopy was done and something was stretched with resolution of her symptoms.  No abdominal pain or nausea or unexplained weight loss.    She does have some anxiety.  Its mostly related to her daughter.  She requests a refill on lorazepam which certainly she has not been abusing.    She is due for a colon exam soon.  No issues with palpitations.  No asthma issues.  She does not see gynecology.               Past Medical History:      Past Medical History:   Diagnosis Date     Colonic polyp 2012    adenomatous, fu 5 years; fu 9/18 one hyper polyp     Dizziness 05/2020    est echo nl     Gross hematuria 2016    ct and cysto via Dr. Watt     Intermittent asthma      Palpitations 12/2018    echo nl     SVT (supraventricular tachycardia) (H) 1994    successful catheter ablation done u of [facility]             Past Surgical History:      Past Surgical History:   Procedure Laterality Date     CARDIAC SURGERY      ablation for SVT/WPW     COLONOSCOPY N/A 9/20/2018    Procedure: COMBINED COLONOSCOPY, SINGLE OR MULTIPLE BIOPSY/POLYPECTOMY BY BIOPSY;  colonoscopy;  Surgeon: Tre Carreon MD;  Location:  GI     ENT SURGERY      wisdom teeth, mandibular mass excision     wpw ablation surgery  41             Social History:     Social History     Socioeconomic History     Marital status:      Spouse name: Not on file     Number of children: 2     Years of education: Not on file     Highest education level: Not on file   Occupational History     Occupation: physical therapist, works admin     Employer: [employer]     Comment:  "retired   Tobacco Use     Smoking status: Never     Smokeless tobacco: Never   Substance and Sexual Activity     Alcohol use: Yes     Alcohol/week: 7.0 standard drinks     Types: 7 Standard drinks or equivalent per week     Comment: one wine a night     Drug use: No     Sexual activity: Yes     Partners: Male   Other Topics Concern     Parent/sibling w/ CABG, MI or angioplasty before 65F 55M? Not Asked   Social History Narrative     Not on file     Social Determinants of Health     Financial Resource Strain: Not on file   Food Insecurity: Not on file   Transportation Needs: Not on file   Physical Activity: Not on file   Stress: Not on file   Social Connections: Not on file   Intimate Partner Violence: Not on file   Housing Stability: Not on file             Family History:   reviewed         Allergies:     Allergies   Allergen Reactions     Iodine-131      Shellfish Allergy Hives             Medications:     Current Outpatient Medications   Medication Sig Dispense Refill     EPINEPHrine (ANY BX GENERIC EQUIV) 0.3 MG/0.3ML injection 2-pack INJECT 0.3 MLS(0.3 MG) INTO THE MUSCLE AS NEEDED FOR ANAPHYLAXIS 0.6 mL 0     LORazepam (ATIVAN) 1 MG tablet Take 1 tablet (1 mg) by mouth every 8 hours as needed for anxiety 6 tablet 0               Review of Systems:   The 10 point Review of Systems is negative other than noted in the HPI           Physical Exam:   Blood pressure (!) 144/78, pulse 113, temperature 97.7  F (36.5  C), temperature source Temporal, resp. rate 16, height 1.727 m (5' 8\"), weight 68.5 kg (151 lb), SpO2 97 %, not currently breastfeeding.    Exam:  Constitutional: healthy appearing, alert and in no distress  Heent: Normocephalic. Head without obvious masses or lesions. PERRLDC, EOMI. Mouth exam within normal limits: tongue, mucous membranes, posterior pharynx all normal, no lesions or abnormalities seen.  Tm's and canals within normal limits bilaterally. Neck supple, no nuchal rigidity or masses. No " supraclavicular, or cervical adenopathy. Thyroid symmetric, no masses.  Cardiovascular: Regular rate and rhythm, no murmer, rub or gallops.  JVP not elevated, no edema.  Carotids within normal limits bilaterally, no bruits.  Respiratory: Normal respiratory effort.  Lungs clear, normal flow, no wheezing or crackles.  Breasts: Normal bilaterally.  No masses or lesions.  Nipples within normal limits.  No axillary lesions or nodes.  My M.A. Was present during this part of the examination.  Gastrointestinal: Normal active bowel sounds.   Soft, not tender, no masses, guarding or rebound.  No hepatosplenomegaly.   Musculoskeletal: extremities normal, no gross deformities noted.  Skin: no suspicious lesions or rashes   Neurologic: Mental status within normal limits.  Speech fluent.  No gross motor abnormalities and gait intact.  Psychiatric: mentation appears normal and affect normal.         Data:   Labs sent        Assessment:   1. Normal complete physical exam  2. Svt, no issues  3. Dysphagia, doubt malig cause but needs eval  4. Colon polyp, follow up this fall  5. Anxiety, controlled for most part  6. Asthma, no issues  7. hcm         Plan:   Letter with labs  Upper gi endoscopy  Colonoscopy  Exercise, diet  Up to date mammogram      Jonn Downing M.D.              Are you in the first 12 months of your Medicare coverage?  No    History of Present Illness       Reason for visit:  Wellness visit    She eats 2-3 servings of fruits and vegetables daily.She consumes 0 sweetened beverage(s) daily.She exercises with enough effort to increase her heart rate 30 to 60 minutes per day.  She exercises with enough effort to increase her heart rate 5 days per week.   She is taking medications regularly.  She is not taking prescribed medications regularly due to Not applicable.  Healthy Habits:     In general, how would you rate your overall health?  Excellent    Frequency of exercise:  4-5 days/week    Duration of exercise:  30-45  "minutes    Do you usually eat at least 4 servings of fruit and vegetables a day, include whole grains    & fiber and avoid regularly eating high fat or \"junk\" foods?  No    Taking medications regularly:  0    Barriers to taking medications:  Not applicable    Medication side effects:  None    Ability to successfully perform activities of daily living:  No assistance needed    Home Safety:  No safety concerns identified    Hearing Impairment:  No hearing concerns    In the past 6 months, have you been bothered by leaking of urine?  No    In general, how would you rate your overall mental or emotional health?  Very good      PHQ-2 Total Score: 0    Additional concerns today:  No      Have you ever done Advance Care Planning? (For example, a Health Directive, POLST, or a discussion with a medical provider or your loved ones about your wishes): No, advance care planning information given to patient to review.  Patient plans to discuss their wishes with loved ones or provider.         Fall risk  Fallen 2 or more times in the past year?: No  Any fall with injury in the past year?: No    Cognitive Screening   1) Repeat 3 items (Leader, Season, Table)    2) Clock draw:   3) 3 item recall:   Results:     Mini-CogTM Copyright JOE Boone. Licensed by the author for use in [facility]; reprinted with permission ([email or web address]). All rights reserved.      Do you have sleep apnea, excessive snoring or daytime drowsiness?: no    Reviewed and updated as needed this visit by clinical staff                  Reviewed and updated as needed this visit by Provider                 Social History     Tobacco Use     Smoking status: Never     Smokeless tobacco: Never   Substance Use Topics     Alcohol use: Yes     Alcohol/week: 7.0 standard drinks     Types: 7 Standard drinks or equivalent per week     Comment: occ         No flowsheet data found.            Current providers sharing in care for this patient include:   Patient Care " "Team:  Jonn Downing MD as PCP - General (Internal Medicine)  Jonn Downing MD as Assigned PCP  Edwardo Ac MD as MD (Allergy & Immunology)  Edwardo Ac MD as Assigned Allergy Provider    The following health maintenance items are reviewed in Epic and correct as of today:  Health Maintenance   Topic Date Due     ANNUAL REVIEW OF HM ORDERS  Never done     ADVANCE CARE PLANNING  Never done     ASTHMA ACTION PLAN  02/11/2014     MEDICARE ANNUAL WELLNESS VISIT  05/01/2019     DEXA  12/20/2020     ASTHMA CONTROL TEST  07/24/2023     FALL RISK ASSESSMENT  01/24/2024     MAMMO SCREENING  11/10/2024     LIPID  05/08/2025     DTAP/TDAP/TD IMMUNIZATION (5 - Td or Tdap) 05/03/2026     COLORECTAL CANCER SCREENING  09/20/2028     HEPATITIS C SCREENING  Completed     PHQ-2 (once per calendar year)  Completed     INFLUENZA VACCINE  Completed     Pneumococcal Vaccine: 65+ Years  Completed     ZOSTER IMMUNIZATION  Completed     COVID-19 Vaccine  Completed     IPV IMMUNIZATION  Aged Out     MENINGITIS IMMUNIZATION  Aged Out               Review of Systems      OBJECTIVE:   There were no vitals taken for this visit. Estimated body mass index is 23.5 kg/m  as calculated from the following:    Height as of 7/11/22: 1.727 m (5' 8\").    Weight as of 12/15/22: 70.1 kg (154 lb 8.7 oz).  Physical Exam          ASSESSMENT / PLAN:             COUNSELING:  Reviewed preventive health counseling, as reflected in patient instructions       Regular exercise       Healthy diet/nutrition        She reports that she has never smoked. She has never used smokeless tobacco.      Appropriate preventive services were discussed with this patient, including applicable screening as appropriate for cardiovascular disease, diabetes, osteopenia/osteoporosis, and glaucoma.  As appropriate for age/gender, discussed screening for colorectal cancer, prostate cancer, breast cancer, and cervical cancer. Checklist reviewing " preventive services available has been given to the patient.    Reviewed patients plan of care and provided an AVS. The Basic Care Plan (routine screening as documented in Health Maintenance) for Paloma meets the Care Plan requirement. This Care Plan has been established and reviewed with the Patient.          Jonn Downing MD  [facility]    Identified Health Risks:

## 2023-01-24 NOTE — PROGRESS NOTES
"  SUBJECTIVE:   Paloma Fitch is a 72 year old female who presents for Preventive Visit.    {Split Bill scripting  The purpose of this visit is to discuss your medical history and prevent health problems before you are sick. You may be responsible for a co-pay, coinsurance, or deductible if your visit today includes services such as checking on a sore throat, having an x-ray or lab test, or treating and evaluating a new or existing condition :437697}  {Patient advised of split billing (Optional):586762}  Are you in the first 12 months of your Medicare Part B coverage?  { :414686::\"No\"}    Physical Health:    In general, how would you rate your overall physical health? { :109992}    Outside of work, how many days during the week do you exercise? { :999638}    Outside of work, approximately how many minutes a day do you exercise?{ :949659}    If you drink alcohol do you typically have >3 drinks per day or >7 drinks per week? { :714689}    Do you usually eat at least 4 servings of fruit and vegetables a day, include whole grains & fiber and avoid regularly eating high fat or \"junk\" foods? { :479872::\"Yes\"}    Do you have any problems taking medications regularly?  { :585497::\"No\"}    Do you have any side effects from medications? { :068842}    Needs assistance for the following daily activities: { :067989}    Which of the following safety concerns are present in your home?  { :272308::\"none identified\"}     Hearing impairment: { :765916}    In the past 6 months, have you been bothered by leaking of urine? { :393759}    Mental Health:    In general, how would you rate your overall mental or emotional health? { :729244}  PHQ-2 Score: 0    Do you feel safe in your environment? { :561506}    Have you ever done Advance Care Planning? (For example, a Health Directive, POLST, or a discussion with a medical provider or your loved ones about your wishes): { :464402}    Additional concerns to address?  {If YES, notify " "patient they may be responsible for a copay, coinsurance or deductible if the visit today includes services such as checking on a sore throat, having an x-ray or lab test, or treating and evaluating a new or existing condition :469436::\"No\"}    Fall risk:  { :585464}  {If any of the above assessments are answered yes, consider ordering appropriate referrals (Optional):001768::\"click delete button to remove this line now\"}  Cognitive Screening: { :494973}    {Do you have sleep apnea, excessive snoring or daytime drowsiness? (Optional):478755}    {Outside tests to abstract? :257548}    {additional problems to add (Optional):618770}    Reviewed and updated as needed this visit by clinical staff    Allergies  Meds  Problems   Surg Hx           Reviewed and updated as needed this visit by Provider    Allergies  Meds  Problems   Surg Hx          Social History     Tobacco Use     Smoking status: Never     Smokeless tobacco: Never   Substance Use Topics     Alcohol use: Yes     Alcohol/week: 7.0 standard drinks     Types: 7 Standard drinks or equivalent per week     Comment: occ                           Current providers sharing in care for this patient include: {Rooming staff:  Please update Care Team in Rooming Activity, refresh this note and then delete this statement}  Patient Care Team:  Jonn Downing MD as PCP - General (Internal Medicine)  Jonn Downing MD as Assigned PCP  Edwardo Ac MD as MD (Allergy & Immunology)  Edwardo Ac MD as Assigned Allergy Provider    The following health maintenance items are reviewed in Epic and correct as of today:  Health Maintenance   Topic Date Due     ANNUAL REVIEW OF HM ORDERS  Never done     ADVANCE CARE PLANNING  Never done     ASTHMA ACTION PLAN  02/11/2014     DEXA  12/20/2020     ASTHMA CONTROL TEST  07/24/2023     MEDICARE ANNUAL WELLNESS VISIT  01/24/2024     FALL RISK ASSESSMENT  01/24/2024     MAMMO SCREENING  11/10/2024     " "LIPID  2025     DTAP/TDAP/TD IMMUNIZATION (5 - Td or Tdap) 2026     COLORECTAL CANCER SCREENING  2028     HEPATITIS C SCREENING  Completed     PHQ-2 (once per calendar year)  Completed     INFLUENZA VACCINE  Completed     Pneumococcal Vaccine: 65+ Years  Completed     ZOSTER IMMUNIZATION  Completed     COVID-19 Vaccine  Completed     IPV IMMUNIZATION  Aged Out     MENINGITIS IMMUNIZATION  Aged Out     {Chronicprobdata (Optional):761040}  {Decision Support (Optional):180136}    ROS:  {ROS COMP:349356}    OBJECTIVE:   BP (!) 175/96 (BP Location: Right arm, Patient Position: Sitting, Cuff Size: Adult Regular)   Pulse 113   Temp 97.7  F (36.5  C) (Temporal)   Resp 16   Ht 1.727 m (5' 8\")   Wt 68.5 kg (151 lb)   SpO2 97%   BMI 22.96 kg/m   Estimated body mass index is 22.96 kg/m  as calculated from the following:    Height as of this encounter: 1.727 m (5' 8\").    Weight as of this encounter: 68.5 kg (151 lb).  EXAM:   {Exam :716393}    {Diagnostic Test Results (Optional):126042::\"Diagnostic Test Results:\",\"Labs reviewed in Epic\"}    ASSESSMENT / PLAN:   {Dia Picklist:382205}    {Patient advised of split billing (Optional):971839}    COUNSELING:  {Medicare Counselin}    Estimated body mass index is 22.96 kg/m  as calculated from the following:    Height as of this encounter: 1.727 m (5' 8\").    Weight as of this encounter: 68.5 kg (151 lb).    {Weight Management Plan (ACO) Complete if BMI is abnormal-  Ages 18-64  BMI >24.9.  Age 65+ with BMI <23 or >30 (Optional):131558}    She reports that she has never smoked. She has never used smokeless tobacco.    Appropriate preventive services were discussed with this patient, including applicable screening as appropriate for cardiovascular disease, diabetes, osteopenia/osteoporosis, and glaucoma.  As appropriate for age/gender, discussed screening for colorectal cancer, prostate cancer, breast cancer, and cervical cancer. Checklist reviewing " preventive services available has been given to the patient.    Reviewed patients plan of care and provided an AVS. The {CarePlan:555707} for Paloma meets the Care Plan requirement. This Care Plan has been established and reviewed with the {PATIENT, FAMILY MEMBER, CAREGIVER:290905}.    Counseling Resources:  ATP IV Guidelines  Pooled Cohorts Equation Calculator  Breast Cancer Risk Calculator  BRCA-Related Cancer Risk Assessment: FHS-7 Tool  FRAX Risk Assessment  ICSI Preventive Guidelines  Dietary Guidelines for Americans, 2010  USDA's MyPlate  ASA Prophylaxis  Lung CA Screening    Jonn Downing MD  [facility]

## 2023-01-24 NOTE — PATIENT INSTRUCTIONS
Patient Education   Personalized Prevention Plan  You are due for the preventive services outlined below.  Your care team is available to assist you in scheduling these services.  If you have already completed any of these items, please share that information with your care team to update in your medical record.  Health Maintenance Due   Topic Date Due     ANNUAL REVIEW OF HM ORDERS  Never done     Discuss Advance Care Planning  Never done     Asthma Action Plan - yearly  02/11/2014     Osteoporosis Screening  12/20/2020

## 2023-01-24 NOTE — RESULT ENCOUNTER NOTE
It was a pleasure seeing you for your physical examination.  I wanted to get back to you with your test results.  I have enclosed a copy for your review.     I am happy to report that your cbc or complete blood count is normal with no signs of anemia, leukemia or platelet abnormalities. Your chemistry panel shows no signs of diabetes.  Your blood salts, kidney tests, liver tests, and proteins are all fine.    Your total cholesterol is 238 with the normal range being below 200.  Your HDL or good cholesterol is wonderful at 80 with the normal range being above 50.  Your LDL or bad cholesterol is 135 with the normal range being below 130.  Given the large amount of good cholesterol overall the numbers look good.    I am happy to bring you this excellent report.  Please let me know if you have questions.    Jonn Downing M.D.

## 2023-03-16 ENCOUNTER — OFFICE VISIT (OUTPATIENT)
Dept: ALLERGY | Facility: CLINIC | Age: 73
End: 2023-03-16
Payer: MEDICARE

## 2023-03-16 VITALS
BODY MASS INDEX: 22.96 KG/M2 | SYSTOLIC BLOOD PRESSURE: 135 MMHG | DIASTOLIC BLOOD PRESSURE: 80 MMHG | HEART RATE: 88 BPM | RESPIRATION RATE: 18 BRPM | OXYGEN SATURATION: 100 % | WEIGHT: 151.01 LBS

## 2023-03-16 DIAGNOSIS — T63.481A ALLERGIC REACTION TO HYMENOPTERA VENOM: ICD-10-CM

## 2023-03-16 DIAGNOSIS — T61.781A ALLERGIC REACTION TO SHELLFISH: Primary | ICD-10-CM

## 2023-03-16 DIAGNOSIS — L50.9 URTICARIA: ICD-10-CM

## 2023-03-16 PROCEDURE — 95076 INGEST CHALLENGE INI 120 MIN: CPT | Performed by: INTERNAL MEDICINE

## 2023-03-16 PROCEDURE — 99214 OFFICE O/P EST MOD 30 MIN: CPT | Mod: 25 | Performed by: INTERNAL MEDICINE

## 2023-03-16 NOTE — PATIENT INSTRUCTIONS
Continue to have an EpiPen available.  Venom blood testing was positive.  May continue to eat lobster.  Call if any concerns on future ingestion.  Crab challenge was          Allergy Staff Appt Hours Shot Hours Location       Physician   Edwardo Ac MD      Support Staff   ELAINA Pacheco, RN   Kadeem KHALIL, MA   Kaiden PICKARD, LPN      Mondays Tuesdays Thursdays and Fridays:  Trena 7-5 Wednesdays  Close                Mondays, Tuesdays and Fridays:  7:40 - 3:20              [facility]  [address]  [place] [address]  Appt Line: [phone]    Pulmonary Function Scheduling:  [place]: [phone]           Questions about cost of your care  For questions about your cost of your visit, procedure, lab or imaging contact:  [employer] [address] Consumer Price Line [phone] or visit:  [email or web address]

## 2023-03-16 NOTE — LETTER
3/16/2023         RE: Paloma Fitch  [address]  [facility] [address]        Dear Colleague,    Thank you for referring your patient, Paloma Fitch, to the [facility] [place]. Please see a copy of my visit note below.    Paloma Fitch was seen in the Allergy Clinic at [facility].    Paloma Fitch is a 73 year old female being seen today for a food challenge     Since the last visit the patient has been doing well.     At the last appointment a lobster challenge was performed and was negative.  Lobster IgE level 0.45.  At her initial appointment on November 15 skin testing was negative to shrimp and lobster and mildly positive to crab and clam.  Blood test to crab was 1.9.  Blood test to shrimp was 11.0.  Also positive to the venoms.    After the last appointment she had lobster on a couple different occasions in [place] without any problems.  Then while back in [place] she went to the [facility] and and had lobster which showed a as well as lobster and lobster ravioli.  This was at the end of January.  She also had wine and Advil and happened to be post COVID infection.  1 hour after eating she developed itching and erythema of her her torso.  She did not have any obvious hives.  She took Benadryl and the rash resolved.  She did not have any shortness of breath or vomiting.  She has had Advil since without any problems.  She has not had lobster since.    Scallop IgE level on blood testing was 0.46    Past Medical History:   Diagnosis Date     Colonic polyp 2012    adenomatous, fu 5 years; fu 9/18 one hyper polyp     Dizziness 05/2020    est echo nl     Gross hematuria 2016    ct and cysto via Dr. Watt     Intermittent asthma      Palpitations 12/2018    echo nl     SVT (supraventricular tachycardia) (H) 1994    successful catheter ablation done [facility]     History reviewed. No pertinent family history.  Past Surgical History:    Procedure Laterality Date     CARDIAC SURGERY      ablation for SVT/WPW     COLONOSCOPY N/A 9/20/2018    Procedure: COMBINED COLONOSCOPY, SINGLE OR MULTIPLE BIOPSY/POLYPECTOMY BY BIOPSY;  colonoscopy;  Surgeon: Tre Carreon MD;  Location:  GI     ENT SURGERY      wisdom teeth, mandibular mass excision     wpw ablation surgery  42         Current Outpatient Medications:      EPINEPHrine (ANY BX GENERIC EQUIV) 0.3 MG/0.3ML injection 2-pack, INJECT 0.3 MLS(0.3 MG) INTO THE MUSCLE AS NEEDED FOR ANAPHYLAXIS, Disp: 0.6 mL, Rfl: 0     LORazepam (ATIVAN) 1 MG tablet, Take 1 tablet (1 mg) by mouth every 8 hours as needed for anxiety, Disp: 6 tablet, Rfl: 0  Allergies   Allergen Reactions     Iodine-131      Shellfish Allergy Hives         EXAM:   BP (!) 172/94   Pulse 113   Wt 68.5 kg (151 lb 0.2 oz)   SpO2 99%   BMI 22.96 kg/m      Constitutional:       General: She is not in acute distress.     Appearance: Normal appearance. She is not ill-appearing.   HENT:      Head: Normocephalic and atraumatic.        Mouth: Mucous membranes are moist.      Pharynx: Oropharynx is clear. No posterior oropharyngeal erythema.   Eyes:      General:         Right eye: No discharge.         Left eye: No discharge.   Cardiovascular:      Rate and Rhythm: Normal rate and regular rhythm.      Heart sounds: Normal heart sounds.   Pulmonary:      Effort: Pulmonary effort is normal.      Breath sounds: Normal breath sounds. No wheezing or rhonchi.   Skin:     General: Skin is warm.      Findings: No erythema or rash.   Neurological:      General: No focal deficit present.      Mental Status: She is alert. Mental status is at baseline.   Psychiatric:         Mood and Affect: Mood normal.         Behavior: Behavior normal.       WORKUP: Food challenge to crab was negative.  After the 20 g dose with we did have her finish off the rest of the crab which was approximately another 20 g.  She completed the oral challenge at 3:30  PM.    Total observation time was 130 minutes    ASSESSMENT/PLAN:  Paloma Fitch is a 73 year old female seen today for a food challenge to crab.  Lobster challenge was negative at the last appointment.  She was able to tolerate lobster on a few different occasions since that appointment however when back in [place] she did have some erythema and itching 1 hour after eating lobster.  Unknown it was related to the lobster or due to variables such as being post COVID, having wine, or Advil.  Based on her history I do NOT recommend avoiding lobster at this point.    1. Continue to have an EpiPen available.  Venom blood testing was positive and still consider you to be shrimp allergic  2. May continue to eat lobster.  Call if any concerns on future ingestion.  3. Crab challenge was negative and may continue to eat crab at home.  4. Recommend continued avoidance of shrimp based on the IgE level of a Leven.  5. May eat scallop at home since the IgE level was 0.4.  Skin testing was also negative to scallop.    Follow-up as needed      Thank you for allowing me to participate in the care of Paloma Fitch.      I spent 30 minutes on the date of the encounter doing chart review, history and exam, documentation and further coordination as noted above exclusive of separately reported interpretations    Edwardo Ac MD  Allergy/Immunology  [facility]      Again, thank you for allowing me to participate in the care of your patient.        Sincerely,        Edwardo Ac MD

## 2023-03-16 NOTE — PROGRESS NOTES
Paloma Fitch was seen in the Allergy Clinic at [facility].    Paloma Fitch is a 73 year old female being seen today for a food challenge     Since the last visit the patient has been doing well.     At the last appointment a lobster challenge was performed and was negative.  Lobster IgE level 0.45.  At her initial appointment on November 15 skin testing was negative to shrimp and lobster and mildly positive to crab and clam.  Blood test to crab was 1.9.  Blood test to shrimp was 11.0.  Also positive to the venoms.    After the last appointment she had lobster on a couple different occasions in [place] without any problems.  Then while back in [place] she went to the [facility] and and had lobster which showed a as well as lobster and lobster ravioli.  This was at the end of January.  She also had wine and Advil and happened to be post COVID infection.  1 hour after eating she developed itching and erythema of her her torso.  She did not have any obvious hives.  She took Benadryl and the rash resolved.  She did not have any shortness of breath or vomiting.  She has had Advil since without any problems.  She has not had lobster since.    Scallop IgE level on blood testing was 0.46    Past Medical History:   Diagnosis Date     Colonic polyp 2012    adenomatous, fu 5 years; fu 9/18 one hyper polyp     Dizziness 05/2020    est echo nl     Gross hematuria 2016    ct and cysto via Dr. Watt     Intermittent asthma      Palpitations 12/2018    echo nl     SVT (supraventricular tachycardia) (H) 1994    successful catheter ablation done [facility]     History reviewed. No pertinent family history.  Past Surgical History:   Procedure Laterality Date     CARDIAC SURGERY      ablation for SVT/WPW     COLONOSCOPY N/A 9/20/2018    Procedure: COMBINED COLONOSCOPY, SINGLE OR MULTIPLE BIOPSY/POLYPECTOMY BY BIOPSY;  colonoscopy;  Surgeon: Tre Carreon MD;  Location:  GI     ENT SURGERY       wisdom teeth, mandibular mass excision     wpw ablation surgery  41         Current Outpatient Medications:      EPINEPHrine (ANY BX GENERIC EQUIV) 0.3 MG/0.3ML injection 2-pack, INJECT 0.3 MLS(0.3 MG) INTO THE MUSCLE AS NEEDED FOR ANAPHYLAXIS, Disp: 0.6 mL, Rfl: 0     LORazepam (ATIVAN) 1 MG tablet, Take 1 tablet (1 mg) by mouth every 8 hours as needed for anxiety, Disp: 6 tablet, Rfl: 0  Allergies   Allergen Reactions     Iodine-131      Shellfish Allergy Hives         EXAM:   BP (!) 172/94   Pulse 113   Wt 68.5 kg (151 lb 0.2 oz)   SpO2 99%   BMI 22.96 kg/m      Constitutional:       General: She is not in acute distress.     Appearance: Normal appearance. She is not ill-appearing.   HENT:      Head: Normocephalic and atraumatic.        Mouth: Mucous membranes are moist.      Pharynx: Oropharynx is clear. No posterior oropharyngeal erythema.   Eyes:      General:         Right eye: No discharge.         Left eye: No discharge.   Cardiovascular:      Rate and Rhythm: Normal rate and regular rhythm.      Heart sounds: Normal heart sounds.   Pulmonary:      Effort: Pulmonary effort is normal.      Breath sounds: Normal breath sounds. No wheezing or rhonchi.   Skin:     General: Skin is warm.      Findings: No erythema or rash.   Neurological:      General: No focal deficit present.      Mental Status: She is alert. Mental status is at baseline.   Psychiatric:         Mood and Affect: Mood normal.         Behavior: Behavior normal.       WORKUP: Food challenge to crab was negative.  After the 20 g dose with we did have her finish off the rest of the crab which was approximately another 20 g.  She completed the oral challenge at 3:30 PM.    Total observation time was 130 minutes    ASSESSMENT/PLAN:  Paloma Fitch is a 73 year old female seen today for a food challenge to crab.  Lobster challenge was negative at the last appointment.  She was able to tolerate lobster on a few different occasions since that  appointment however when back in [place] she did have some erythema and itching 1 hour after eating lobster.  Unknown it was related to the lobster or due to variables such as being post COVID, having wine, or Advil.  Based on her history I do NOT recommend avoiding lobster at this point.    1. Continue to have an EpiPen available.  Venom blood testing was positive and still consider you to be shrimp allergic  2. May continue to eat lobster.  Call if any concerns on future ingestion.  3. Crab challenge was negative and may continue to eat crab at home.  4. Recommend continued avoidance of shrimp based on the IgE level of nba Conteh.  5. May eat scallop at home since the IgE level was 0.4.  Skin testing was also negative to scallop.    Follow-up as needed      Thank you for allowing me to participate in the care of Paloma Fitch.      I spent 30 minutes on the date of the encounter doing chart review, history and exam, documentation and further coordination as noted above exclusive of separately reported interpretations    Edwardo Ac MD  Allergy/Immunology  [facility]

## 2023-05-04 ENCOUNTER — MYC MEDICAL ADVICE (OUTPATIENT)
Dept: FAMILY MEDICINE | Facility: CLINIC | Age: 73
End: 2023-05-04
Payer: MEDICARE

## 2023-05-04 NOTE — TELEPHONE ENCOUNTER
Patient called and will follow your lead can she see Lita HOPSON, as she has seen her in the past.

## 2023-05-08 NOTE — TELEPHONE ENCOUNTER
Patient Contact    Attempt # 1    Was call answered?  No.  Left message on voicemail with information to back to clinic to schedule virtual appt with Dr. Downing on Tuesday 5/9/23 at 1:45 - routing to team to please follow up with patient and ensure appointment is scheduled - thank you!    Janie Jean RN  [facility]

## 2023-05-09 ENCOUNTER — VIRTUAL VISIT (OUTPATIENT)
Dept: FAMILY MEDICINE | Facility: CLINIC | Age: 73
End: 2023-05-09
Payer: MEDICARE

## 2023-05-09 DIAGNOSIS — F41.9 ANXIETY: Primary | ICD-10-CM

## 2023-05-09 PROCEDURE — 99213 OFFICE O/P EST LOW 20 MIN: CPT | Mod: VID | Performed by: INTERNAL MEDICINE

## 2023-05-09 RX ORDER — LORAZEPAM 1 MG/1
1 TABLET ORAL EVERY 8 HOURS PRN
Qty: 10 TABLET | Refills: 0 | Status: SHIPPED | OUTPATIENT
Start: 2023-05-09 | End: 2023-06-12

## 2023-05-09 ASSESSMENT — ASTHMA QUESTIONNAIRES: ACT_TOTALSCORE: 25

## 2023-05-09 ASSESSMENT — ANXIETY QUESTIONNAIRES
4. TROUBLE RELAXING: SEVERAL DAYS
6. BECOMING EASILY ANNOYED OR IRRITABLE: NOT AT ALL
5. BEING SO RESTLESS THAT IT IS HARD TO SIT STILL: SEVERAL DAYS
7. FEELING AFRAID AS IF SOMETHING AWFUL MIGHT HAPPEN: NEARLY EVERY DAY
IF YOU CHECKED OFF ANY PROBLEMS ON THIS QUESTIONNAIRE, HOW DIFFICULT HAVE THESE PROBLEMS MADE IT FOR YOU TO DO YOUR WORK, TAKE CARE OF THINGS AT HOME, OR GET ALONG WITH OTHER PEOPLE: SOMEWHAT DIFFICULT
GAD7 TOTAL SCORE: 14
GAD7 TOTAL SCORE: 14
2. NOT BEING ABLE TO STOP OR CONTROL WORRYING: NEARLY EVERY DAY
7. FEELING AFRAID AS IF SOMETHING AWFUL MIGHT HAPPEN: NEARLY EVERY DAY
8. IF YOU CHECKED OFF ANY PROBLEMS, HOW DIFFICULT HAVE THESE MADE IT FOR YOU TO DO YOUR WORK, TAKE CARE OF THINGS AT HOME, OR GET ALONG WITH OTHER PEOPLE?: SOMEWHAT DIFFICULT
GAD7 TOTAL SCORE: 14
3. WORRYING TOO MUCH ABOUT DIFFERENT THINGS: NEARLY EVERY DAY
1. FEELING NERVOUS, ANXIOUS, OR ON EDGE: NEARLY EVERY DAY

## 2023-05-09 NOTE — PROGRESS NOTES
Paloma is a 73 year old who is being evaluated via a billable video visit.      How would you like to obtain your AVS? MyChart  If the video visit is dropped, the invitation should be resent by: Text to cell phone: [phone]  Will anyone else be joining your video visit? No              Subjective   Paloma is a 73 year old, presenting for the following health issues:  No chief complaint on file.         View : No data to display.              This is a very pleasant 73-year-old here for anxiety issues.  Is been going on for quite some time.  Some of it relates to not working and some of it relates to COVID and some of it relates to the fact that she worries.  Part of it relates to her daughter who has had health issues.  She was given lorazepam as noted during her last visit and this does help but she does not take it very often.  She has never been on treatment for this before.  She does not have depression.  She just worries a lot and thinks about things a lot.  She does exercise and eat healthy.  She is open to seeing a counselor as well.  She is not a smoker or heavy drinker.    Vitals:  No vitals were obtained today due to virtual visit.    Physical Exam   GENERAL: Healthy, alert and no distress  EYES: Eyes grossly normal to inspection.  No discharge or erythema, or obvious scleral/conjunctival abnormalities.  RESP: No audible wheeze, cough, or visible cyanosis.  No visible retractions or increased work of breathing.    SKIN: Visible skin clear. No significant rash, abnormal pigmentation or lesions.  NEURO: Cranial nerves grossly intact.  Mentation and speech appropriate for age.  PSYCH: Mentation appears normal, affect normal/bright, judgement and insight intact, normal speech and appearance well-groomed.      ASSESSMENT:  Anxiety    PLAN:  1.  Continue exercise  2.  Counseling  3.  Zoloft starting at 25 mg for 4 days then 50 mg.  She will call if she worsens or has side effects.  She will send me an update  in about 5 weeks.    Jonn Downing M.D.            Video-Visit Details    Type of service:  Video Visit   Video Start Time: 2:02 PM  Video End Time:2:15 PM    Originating Location (pt. Location): Home    Distant Location (provider location):  On-site  Platform used for Video Visit: Lily

## 2023-06-12 DIAGNOSIS — F41.9 ANXIETY: ICD-10-CM

## 2023-06-12 RX ORDER — LORAZEPAM 1 MG/1
TABLET ORAL
Qty: 10 TABLET | Refills: 0 | Status: SHIPPED | OUTPATIENT
Start: 2023-06-12 | End: 2023-07-06

## 2023-06-21 ENCOUNTER — LAB (OUTPATIENT)
Dept: LAB | Facility: CLINIC | Age: 73
End: 2023-06-21
Payer: MEDICARE

## 2023-06-21 ENCOUNTER — DOCUMENTATION ONLY (OUTPATIENT)
Dept: LAB | Facility: CLINIC | Age: 73
End: 2023-06-21

## 2023-06-21 DIAGNOSIS — R25.3 FASCICULATIONS: Primary | ICD-10-CM

## 2023-06-21 LAB
ANION GAP SERPL CALCULATED.3IONS-SCNC: 13 MMOL/L (ref 7–15)
BUN SERPL-MCNC: 16 MG/DL (ref 8–23)
CALCIUM SERPL-MCNC: 9.7 MG/DL (ref 8.8–10.2)
CHLORIDE SERPL-SCNC: 97 MMOL/L (ref 98–107)
CREAT SERPL-MCNC: 0.96 MG/DL (ref 0.51–0.95)
DEPRECATED HCO3 PLAS-SCNC: 23 MMOL/L (ref 22–29)
ERYTHROCYTE [DISTWIDTH] IN BLOOD BY AUTOMATED COUNT: 13.6 % (ref 10–15)
GFR SERPL CREATININE-BSD FRML MDRD: 62 ML/MIN/1.73M2
GLUCOSE SERPL-MCNC: 130 MG/DL (ref 70–99)
HCT VFR BLD AUTO: 43.6 % (ref 35–47)
HGB BLD-MCNC: 14.4 G/DL (ref 11.7–15.7)
HOLD SPECIMEN: NORMAL
MAGNESIUM SERPL-MCNC: 2.3 MG/DL (ref 1.7–2.3)
MCH RBC QN AUTO: 30.8 PG (ref 26.5–33)
MCHC RBC AUTO-ENTMCNC: 33 G/DL (ref 31.5–36.5)
MCV RBC AUTO: 93 FL (ref 78–100)
PLATELET # BLD AUTO: 427 10E3/UL (ref 150–450)
POTASSIUM SERPL-SCNC: 4.6 MMOL/L (ref 3.4–5.3)
RBC # BLD AUTO: 4.67 10E6/UL (ref 3.8–5.2)
SODIUM SERPL-SCNC: 133 MMOL/L (ref 136–145)
TSH SERPL DL<=0.005 MIU/L-ACNC: 3.38 UIU/ML (ref 0.3–4.2)
WBC # BLD AUTO: 11.7 10E3/UL (ref 4–11)

## 2023-06-21 PROCEDURE — 84443 ASSAY THYROID STIM HORMONE: CPT | Performed by: INTERNAL MEDICINE

## 2023-06-21 PROCEDURE — 83735 ASSAY OF MAGNESIUM: CPT | Performed by: INTERNAL MEDICINE

## 2023-06-21 PROCEDURE — 85027 COMPLETE CBC AUTOMATED: CPT | Performed by: INTERNAL MEDICINE

## 2023-06-21 PROCEDURE — 80048 BASIC METABOLIC PNL TOTAL CA: CPT | Performed by: INTERNAL MEDICINE

## 2023-06-21 PROCEDURE — 36415 COLL VENOUS BLD VENIPUNCTURE: CPT

## 2023-06-21 NOTE — PROGRESS NOTES
"Pt came in for labs per Dr. Downing, stated she had spoken to him yesterday. Looks like there is a Ge[employer] message indicating the need for labs from 6/19/23, but no labs ordered. Cristhian RICKS, if necessary please place labs as \"add-ons\" to the extra tubes. Thank you.  "

## 2023-06-22 ENCOUNTER — MYC MEDICAL ADVICE (OUTPATIENT)
Dept: FAMILY MEDICINE | Facility: CLINIC | Age: 73
End: 2023-06-22
Payer: MEDICARE

## 2023-06-22 DIAGNOSIS — F41.9 ANXIETY: ICD-10-CM

## 2023-06-22 RX ORDER — SERTRALINE HYDROCHLORIDE 100 MG/1
TABLET, FILM COATED ORAL
Qty: 90 TABLET | Refills: 1 | Status: SHIPPED | OUTPATIENT
Start: 2023-06-22 | End: 2023-12-11

## 2023-06-22 NOTE — TELEPHONE ENCOUNTER
See MyChart from patient needing provider review.   Please write back directly to pt or advise if clinic follow up needed.     Pema DIAZ, RN  [facility] RN Triage Team

## 2023-06-22 NOTE — RESULT ENCOUNTER NOTE
Good morning,    I am happy to report that your labs look fine.  I do not think the elevated white count, low sodium or chloride are significant issues.  The sugar is just a bit high but I do not suspect you were fasting.    It is certainly possible that the Zoloft is causing the fasciculations especially if they started around the same time that you began it.  If that is the case we can make a change and try a different medication.  Please let me know your thoughts.    Jonn Downing M.D.

## 2023-06-22 NOTE — TELEPHONE ENCOUNTER
[facility], Dr. Bills at [facility], if he is still practicing.    Jonn Downing M.D.

## 2023-07-05 ENCOUNTER — MYC MEDICAL ADVICE (OUTPATIENT)
Dept: FAMILY MEDICINE | Facility: CLINIC | Age: 73
End: 2023-07-05
Payer: MEDICARE

## 2023-07-05 ENCOUNTER — TELEPHONE (OUTPATIENT)
Dept: FAMILY MEDICINE | Facility: CLINIC | Age: 73
End: 2023-07-05

## 2023-07-05 DIAGNOSIS — R25.3 FASCICULATION: ICD-10-CM

## 2023-07-05 DIAGNOSIS — F41.9 ANXIETY: ICD-10-CM

## 2023-07-05 DIAGNOSIS — F41.9 ANXIETY: Primary | ICD-10-CM

## 2023-07-05 NOTE — TELEPHONE ENCOUNTER
Pt called requesting neurology referral within [place]. States during last virtual appt, PCP advised her to see a neurologist at [facility] or [facility]. Pt called neurology clinics above, and they don't have any appts until September. [place] neurology is able to see her sooner, if a referral is placed. Pt is concerned with muscle fasciculation, possible atrophy and weight loss. Pt asked if referral can be placed or approve overbook VV 07/06/2023 to discuss referral. Pt has increased anxiety and is losing sleep with concerns. Pt declines office visit with team providers. Denies unilateal weakness, chest pain, breathing or vision problems.       Pt asked neurology referral to rule out motor neuron disease, muscle fasciculation, possible atrophy anterior tib and weight loss.     Pt would need a call back with providers advice on referral of overbook phone appt 07/06/2023.    Ok to leave a detailed voice message.

## 2023-07-06 ENCOUNTER — TELEPHONE (OUTPATIENT)
Dept: NEUROLOGY | Facility: CLINIC | Age: 73
End: 2023-07-06
Payer: MEDICARE

## 2023-07-06 RX ORDER — LORAZEPAM 1 MG/1
TABLET ORAL
Qty: 10 TABLET | Refills: 0 | Status: SHIPPED | OUTPATIENT
Start: 2023-07-06 | End: 2023-07-25

## 2023-07-06 ASSESSMENT — ANXIETY QUESTIONNAIRES
6. BECOMING EASILY ANNOYED OR IRRITABLE: NOT AT ALL
3. WORRYING TOO MUCH ABOUT DIFFERENT THINGS: MORE THAN HALF THE DAYS
4. TROUBLE RELAXING: MORE THAN HALF THE DAYS
GAD7 TOTAL SCORE: 15
IF YOU CHECKED OFF ANY PROBLEMS ON THIS QUESTIONNAIRE, HOW DIFFICULT HAVE THESE PROBLEMS MADE IT FOR YOU TO DO YOUR WORK, TAKE CARE OF THINGS AT HOME, OR GET ALONG WITH OTHER PEOPLE: SOMEWHAT DIFFICULT
2. NOT BEING ABLE TO STOP OR CONTROL WORRYING: NEARLY EVERY DAY
5. BEING SO RESTLESS THAT IT IS HARD TO SIT STILL: MORE THAN HALF THE DAYS
7. FEELING AFRAID AS IF SOMETHING AWFUL MIGHT HAPPEN: NEARLY EVERY DAY
GAD7 TOTAL SCORE: 15
1. FEELING NERVOUS, ANXIOUS, OR ON EDGE: NEARLY EVERY DAY

## 2023-07-06 NOTE — TELEPHONE ENCOUNTER
Scheduled pt per below     Also asking for refill on Claudia WELLINGTON, Triage RN  [facility]

## 2023-07-06 NOTE — TELEPHONE ENCOUNTER
[facility] Call Center    Phone Message    May a detailed message be left on voicemail: yes     Reason for Call: Appointment Intake    Referring Provider Name: Jonn Downing MD in  FAMILY PRAC/IM  Diagnosis and/or Symptoms: Fasciculation      Patient called to schedule a new patient appt, Dx listed is not in Neurology Protocols please review and assist with referral    Action Taken: Message routed to:  [facility] ([facility]): [facility] Neurology    Travel Screening: Not Applicable

## 2023-07-10 ENCOUNTER — OFFICE VISIT (OUTPATIENT)
Dept: FAMILY MEDICINE | Facility: CLINIC | Age: 73
End: 2023-07-10
Payer: MEDICARE

## 2023-07-10 VITALS
RESPIRATION RATE: 16 BRPM | BODY MASS INDEX: 22.13 KG/M2 | TEMPERATURE: 97.7 F | SYSTOLIC BLOOD PRESSURE: 133 MMHG | OXYGEN SATURATION: 100 % | HEIGHT: 68 IN | DIASTOLIC BLOOD PRESSURE: 88 MMHG | WEIGHT: 146 LBS | HEART RATE: 107 BPM

## 2023-07-10 DIAGNOSIS — M62.50 MUSCULAR ATROPHY, UNSPECIFIED SITE: ICD-10-CM

## 2023-07-10 DIAGNOSIS — R25.3 FASCICULATIONS: Primary | ICD-10-CM

## 2023-07-10 PROCEDURE — 99213 OFFICE O/P EST LOW 20 MIN: CPT | Performed by: INTERNAL MEDICINE

## 2023-07-10 ASSESSMENT — PAIN SCALES - GENERAL: PAINLEVEL: NO PAIN (0)

## 2023-07-10 NOTE — TELEPHONE ENCOUNTER
Patient is scheduled Friday, August 18th with Dr. Curtis at the [facility]. Patient is requesting a call if any cancellations come up before then.     Taylor Severson on 7/10/2023 at 3:41 PM

## 2023-07-10 NOTE — PROGRESS NOTES
The patient presents with her  very worried about fasciculations that could be a sign of ALS.  Her symptoms started at the end of September when she had fasciculations for 3 days near the right elbow.  They moved down to the right and left legs and have not gone away since then.  She was started on Zoloft but this was after the fasciculations began.  She feels as if there is some muscle atrophy in different places including between the digits of her thumb and first finger right more than left.  She notes an indentation in the distal muscle of the right leg.  She is not having swallowing issues.  She is not having weakness.  No fevers or night sweats.  No other symptoms.  Recent labs were done as noted and reviewed.  She is slightly hyponatremic which I suspect is from the Zoloft.  She does admit to some anxiety.    Past Medical History:   Diagnosis Date     Anxiety 05/09/2023    added zoloft     Colonic polyp 2012    adenomatous, fu 5 years; fu 9/18 one hyper polyp     Dizziness 05/2020    est echo nl     Gross hematuria 2016    ct and cysto via Dr. Watt     Intermittent asthma      Palpitations 12/2018    echo nl     SVT (supraventricular tachycardia) (H) 1994    successful catheter ablation done u of m     Past Surgical History:   Procedure Laterality Date     CARDIAC SURGERY      ablation for SVT/WPW     COLONOSCOPY N/A 9/20/2018    Procedure: COMBINED COLONOSCOPY, SINGLE OR MULTIPLE BIOPSY/POLYPECTOMY BY BIOPSY;  colonoscopy;  Surgeon: Tre Carreon MD;  Location:  GI     ENT SURGERY      wisdom teeth, mandibular mass excision     wpw ablation surgery  41     Social History     Socioeconomic History     Marital status:      Spouse name: Not on file     Number of children: 2     Years of education: Not on file     Highest education level: Not on file   Occupational History     Occupation: physical therapist, works admin     Employer: [employer]     Comment: retired   Tobacco Use  "    Smoking status: Never     Smokeless tobacco: Never   Substance and Sexual Activity     Alcohol use: Yes     Alcohol/week: 7.0 standard drinks of alcohol     Types: 7 Standard drinks or equivalent per week     Comment: one wine a night     Drug use: No     Sexual activity: Yes     Partners: Male   Other Topics Concern     Parent/sibling w/ CABG, MI or angioplasty before 65F 55M? Not Asked   Social History Narrative     Not on file     Social Determinants of Health     Financial Resource Strain: Not on file   Food Insecurity: Not on file   Transportation Needs: Not on file   Physical Activity: Not on file   Stress: Not on file   Social Connections: Not on file   Intimate Partner Violence: Not on file   Housing Stability: Not on file     Current Outpatient Medications   Medication Sig Dispense Refill     EPINEPHrine (ANY BX GENERIC EQUIV) 0.3 MG/0.3ML injection 2-pack INJECT 0.3 MLS(0.3 MG) INTO THE MUSCLE AS NEEDED FOR ANAPHYLAXIS 0.6 mL 0     LORazepam (ATIVAN) 1 MG tablet TAKE 1 TABLET(1 MG) BY MOUTH EVERY 8 HOURS AS NEEDED FOR ANXIETY 10 tablet 0     sertraline (ZOLOFT) 100 MG tablet Take 1/2 daily for 4 days then change to 1 pill daily 90 tablet 1     Allergies   Allergen Reactions     Iodine-131      Shellfish Allergy Hives     FAMILY HISTORY NOTED AND REVIEWED    REVIEW OF SYSTEMS: above    PHYSICAL EXAM    /88 (BP Location: Right arm, Patient Position: Sitting, Cuff Size: Adult Regular)   Pulse 107   Temp 97.7  F (36.5  C) (Oral)   Resp 16   Ht 1.727 m (5' 8\")   Wt 66.2 kg (146 lb)   SpO2 100%   BMI 22.20 kg/m      Patient appears non toxic  Lungs - clear, normal flow  Cardiovascular - regular rate and rhythm, no murmer, rub or gallop, no jvp or edema, carotids within normal limits, no bruits.  Abdomen - normal active bowel sounds, soft, non tender, no masses, guarding or rebound, no hepatosplenomegaly  Mouth within normal limits  Cn within normal limits  Reflexes in legs within normal " limits      Labs noted    ASSESSMENT:  Muscle fasciculation, unlikely to be ALS.  Suspect idiopathic.  I did discuss that it is possible could be the Zoloft although certainly they did begin after the Zoloft.  I do not think she has some other form of motor neuron disease.    PLAN:  I did put in a referral for an urgent request to see neurology.  If her symptoms change she will let me know.      Jonn Downing M.D.

## 2023-07-10 NOTE — TELEPHONE ENCOUNTER
[facility] Call Center    Phone Message    May a detailed message be left on voicemail: yes     Reason for Call: Appointment Intake    Referring Provider Name: Jonn Campbell  Diagnosis and/or Symptoms: Muscular atrophy, unspecified site [M62.50] fasciulations, concern for motor neuron disease    Please review referral and assist with scheduling, patient is requesting Dr. Cooper for [place] or [place] location.    Action Taken: Message routed to:  [facility] ([facility]): [facility] Neurology    Travel Screening: Not Applicable

## 2023-07-13 NOTE — TELEPHONE ENCOUNTER
RECORDS RECEIVED FROM: Internal    REASON FOR VISIT: R25.3 (ICD-10-CM) - Fasciculations  M62.50 (ICD-10-CM) - Muscular atrophy, unspecified site   Date of Appt: 8/18/23 2:15pm    NOTES (FOR ALL VISITS) STATUS DETAILS   OFFICE NOTE from referring provider Internal 7/10/23 Jonn Downing MD @ [facility]     MEDICATION LIST Internal    IMAGING  (FOR ALL VISITS)     MRI (HEAD, NECK, SPINE) Internal [facility]  5/8/20 MRA Neck ([place])  5/8/20 MR Brain  5/8/20 MRA Brain ([place] [facility] [place])     CT (HEAD, NECK, SPINE) Internal [facility]  5/7/20  CT Head

## 2023-07-25 DIAGNOSIS — F41.9 ANXIETY: ICD-10-CM

## 2023-07-25 RX ORDER — LORAZEPAM 1 MG/1
TABLET ORAL
Qty: 10 TABLET | Refills: 0 | Status: SHIPPED | OUTPATIENT
Start: 2023-07-25 | End: 2023-10-10

## 2023-07-25 NOTE — TELEPHONE ENCOUNTER
"Pt called the clinic and stated a refill request was submitted for lorazepam. Informed pt it was sent to pharmacy today.     Pt stated while on the phone waiting, she was stung by a yellow jacket. Pt stated she didn't need to use her Epipen and is fine and \"not to worry about it\".   "

## 2023-08-18 ENCOUNTER — PRE VISIT (OUTPATIENT)
Dept: NEUROLOGY | Facility: CLINIC | Age: 73
End: 2023-08-18

## 2023-08-18 ENCOUNTER — OFFICE VISIT (OUTPATIENT)
Dept: NEUROLOGY | Facility: CLINIC | Age: 73
End: 2023-08-18
Payer: MEDICARE

## 2023-08-18 VITALS
SYSTOLIC BLOOD PRESSURE: 140 MMHG | HEART RATE: 96 BPM | OXYGEN SATURATION: 99 % | DIASTOLIC BLOOD PRESSURE: 89 MMHG | RESPIRATION RATE: 16 BRPM

## 2023-08-18 DIAGNOSIS — R25.3 FASCICULATION: ICD-10-CM

## 2023-08-18 PROCEDURE — 99204 OFFICE O/P NEW MOD 45 MIN: CPT | Performed by: PSYCHIATRY & NEUROLOGY

## 2023-08-18 RX ORDER — MULTIPLE VITAMINS W/ MINERALS TAB 9MG-400MCG
1 TAB ORAL DAILY
COMMUNITY

## 2023-08-18 RX ORDER — KETOCONAZOLE 20 MG/G
CREAM TOPICAL
COMMUNITY
Start: 2023-05-16

## 2023-08-18 RX ORDER — VALACYCLOVIR HYDROCHLORIDE 500 MG/1
TABLET, FILM COATED ORAL
COMMUNITY
Start: 2023-07-25

## 2023-08-18 ASSESSMENT — PAIN SCALES - GENERAL: PAINLEVEL: NO PAIN (0)

## 2023-08-18 NOTE — PROGRESS NOTES
Paloma Fitch MRN# [id number]   Age: 73 year old YOB: 1950     Requesting physician: Jonn García            Assessment and Plan:     (R25.3) Fasciculation  Comment: We discussed today that for majority of people this is a benign phenomenon and can be exacerbated if dehydrated or participating in a lot of exercise. Advised use of electrolytes with hydration such as Nuun tablets or Liquid IV.     We will pursue and EMG to ensure we aren't missing something more serious. I did advise her that I am reassured by her normal neurological examination.    An undiagnosed condition, as part of the appointment I reviewed 4 different lab results, notes from Dr. Downing and ordered an EMG.    Corin Curtis MD             History of Present Illness:     chief complaint:   Chief Complaint   Patient presents with    Consult        Paloma Fitch is a 73 year old left handed patient presenting for assessment of fasciculations. She is very nervous this represents the start of ALS.    She first noticed a problem on April 23, 2023. Her right brachioradialis started twitching and it lasted until the 25th. No clear trigger or injury.   Since then she has noticed twitches more randomly that can occur on both legs (righ tmore than left) and in arms.  On the night before this appointment she noted twitching in the shoulder.     She acknowledges a tendency towards anxiety and this has triggered a lot more worry and concern She has started Zoloft and it has helped a little but she is still very worried. 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     Caffeine minimal intake.  She is very active spending lots of time outside both working out and gardening            Physical Exam:     BP (!) 140/89   Pulse 96   Resp 16   SpO2 99%   General Appearance:  Well groomed and cooperative with exam, NAD  Neurological  Examination:  Cognition and Orientation: The patient is oriented to person, place and self. Normal attention and recall. No aphasia or dysarthria  Cranial Nerves: 2-12 intact. No tongue fasciculations.   General Motor Survey: The patient has normal muscle bulk, tone and strength in all four extremities. No tremor present. No fasciculations  Reflexes: Upper and lower extremity reflexes are within normal limits and bilaterally symmetric. Plantar responses are flexor.  Coordination: Normal coordination of finger to nose and heel-knee-shin.   Gait: Normal gait. Romberg negative. Able to walk on toes, heels and tandem walk  Sensory: Normal sensation to vibration and light touch in all four ext.            Pertinent history/data     Component      Latest Ref Rng 6/21/2023  2:57 PM   Sodium      136 - 145 mmol/L 133 (L)    Potassium      3.4 - 5.3 mmol/L 4.6    Chloride      98 - 107 mmol/L 97 (L)    Carbon Dioxide (CO2)      22 - 29 mmol/L 23    Anion Gap      7 - 15 mmol/L 13    Urea Nitrogen      8.0 - 23.0 mg/dL 16.0    Creatinine      0.51 - 0.95 mg/dL 0.96 (H)    Calcium      8.8 - 10.2 mg/dL 9.7    Glucose      70 - 99 mg/dL 130 (H)    GFR Estimate      >60 mL/min/1.73m2 62    WBC      4.0 - 11.0 10e3/uL 11.7 (H)    RBC Count      3.80 - 5.20 10e6/uL 4.67    Hemoglobin      11.7 - 15.7 g/dL 14.4    Hematocrit      35.0 - 47.0 % 43.6    MCV      78 - 100 fL 93    MCH      26.5 - 33.0 pg 30.8    MCHC      31.5 - 36.5 g/dL 33.0    RDW      10.0 - 15.0 % 13.6    Platelet Count      150 - 450 10e3/uL 427    TSH      0.30 - 4.20 uIU/mL 3.38    Magnesium      1.7 - 2.3 mg/dL 2.3       Legend:  (L) Low  (H) High

## 2023-08-18 NOTE — LETTER
8/18/2023       RE: Paloma Fitch  [address]  [facility] [address]       Dear Colleague,    Thank you for referring your patient, Paloma Fitch, to the [facility] [place] [facility]. Please see a copy of my visit note below.            Paloma Fitch MRN# [id number]   Age: 73 year old YOB: 1950     Requesting physician: Jonn García            Assessment and Plan:     (R25.3) Fasciculation  Comment: We discussed today that for majority of people this is a benign phenomenon and can be exacerbated if dehydrated or participating in a lot of exercise. Advised use of electrolytes with hydration such as Nuun tablets or Liquid IV.     We will pursue and EMG to ensure we aren't missing something more serious. I did advise her that I am reassured by her normal neurological examination.    An undiagnosed condition, as part of the appointment I reviewed 4 different lab results, notes from Dr. Downing and ordered an EMG.    Corin Curtis MD             History of Present Illness:     chief complaint:   Chief Complaint   Patient presents with    Consult        Paloma Fitch is a 73 year old left handed patient presenting for assessment of fasciculations. She is very nervous this represents the start of ALS.    She first noticed a problem on April 23, 2023. Her right brachioradialis started twitching and it lasted until the 25th. No clear trigger or injury.   Since then she has noticed twitches more randomly that can occur on both legs (righ tmore than left) and in arms.  On the night before this appointment she noted twitching in the shoulder.     She acknowledges a tendency towards anxiety and this has triggered a lot more worry and concern She has started Zoloft and it has helped a little but she is still very worried. 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     Caffeine minimal intake.  She is very active spending lots of time outside both working out and gardening            Physical Exam:     BP (!) 140/89   Pulse 96   Resp 16   SpO2 99%   General Appearance:  Well groomed and cooperative with exam, NAD  Neurological Examination:  Cognition and Orientation: The patient is oriented to person, place and self. Normal attention and recall. No aphasia or dysarthria  Cranial Nerves: 2-12 intact. No tongue fasciculations.   General Motor Survey: The patient has normal muscle bulk, tone and strength in all four extremities. No tremor present. No fasciculations  Reflexes: Upper and lower extremity reflexes are within normal limits and bilaterally symmetric. Plantar responses are flexor.  Coordination: Normal coordination of finger to nose and heel-knee-shin.   Gait: Normal gait. Romberg negative. Able to walk on toes, heels and tandem walk  Sensory: Normal sensation to vibration and light touch in all four ext.            Pertinent history/data     Component      Latest Ref Rn 6/21/2023  2:57 PM   Sodium      136 - 145 mmol/L 133 (L)    Potassium      3.4 - 5.3 mmol/L 4.6    Chloride      98 - 107 mmol/L 97 (L)    Carbon Dioxide (CO2)      22 - 29 mmol/L 23    Anion Gap      7 - 15 mmol/L 13    Urea Nitrogen      8.0 - 23.0 mg/dL 16.0    Creatinine      0.51 - 0.95 mg/dL 0.96 (H)    Calcium      8.8 - 10.2 mg/dL 9.7    Glucose      70 - 99 mg/dL 130 (H)    GFR Estimate      >60 mL/min/1.73m2 62    WBC      4.0 - 11.0 10e3/uL 11.7 (H)    RBC Count      3.80 - 5.20 10e6/uL 4.67    Hemoglobin      11.7 - 15.7 g/dL 14.4    Hematocrit      35.0 - 47.0 % 43.6    MCV      78 - 100 fL 93    MCH      26.5 - 33.0 pg 30.8    MCHC      31.5 - 36.5 g/dL 33.0    RDW      10.0 - 15.0 % 13.6    Platelet Count      150 - 450 10e3/uL 427    TSH      0.30 - 4.20 uIU/mL 3.38    Magnesium      1.7 - 2.3 mg/dL 2.3       Legend:  (L)  Low  (H) High      Again, thank you for allowing me to participate in the care of your patient.      Sincerely,    Corin Curtis MD

## 2023-10-16 ENCOUNTER — TELEPHONE (OUTPATIENT)
Dept: NEUROLOGY | Facility: CLINIC | Age: 73
End: 2023-10-16
Payer: MEDICARE

## 2023-10-17 ENCOUNTER — OFFICE VISIT (OUTPATIENT)
Dept: NEUROLOGY | Facility: CLINIC | Age: 73
End: 2023-10-17
Payer: MEDICARE

## 2023-10-17 DIAGNOSIS — M54.17 LUMBOSACRAL RADICULOPATHY AT S1: Primary | ICD-10-CM

## 2023-10-17 DIAGNOSIS — R94.131 ABNORMAL SPONTANEOUS ACTIVITY ON EMG: ICD-10-CM

## 2023-10-17 DIAGNOSIS — R25.3 FASCICULATION: ICD-10-CM

## 2023-10-17 PROCEDURE — 95910 NRV CNDJ TEST 7-8 STUDIES: CPT | Performed by: PSYCHIATRY & NEUROLOGY

## 2023-10-17 PROCEDURE — 95885 MUSC TST DONE W/NERV TST LIM: CPT | Mod: 59 | Performed by: PSYCHIATRY & NEUROLOGY

## 2023-10-17 PROCEDURE — 95887 MUSC TST DONE W/N TST NONEXT: CPT | Performed by: PSYCHIATRY & NEUROLOGY

## 2023-10-17 PROCEDURE — 95886 MUSC TEST DONE W/N TEST COMP: CPT | Mod: RT | Performed by: PSYCHIATRY & NEUROLOGY

## 2023-10-17 NOTE — PROGRESS NOTES
[facility]  Electrodiagnostic Laboratory                 Department of Neurology                                                                                                         Test Date:  10/17/2023    Patient: Paloma Fitch : 1950 Physician: Cory Díaz MD   Sex: Female AGE: 73 year Ref Phys: Corin Curtis MD   ID#: [id number]   Technician: KACEY Donnelly/CARL     History and Examination:    73-year-old woman with chief complaint of twitching (fasciculations) occurring mostly in muscles below the knee bilaterally.  At some point she had mild twitching at her right brachioradialis and shoulder.  Query benign fasciculation syndrome versus more sinister neuromuscular cause of fasciculations like motor neuron disease.    Techniques:    Motor and sensory conduction studies were done with surface recording electrodes. EMG was done with a concentric needle electrode.     Results:    Right fibular (EDB), tibial (AH), median (APB), and ulnar (ADM) motor nerve conduction studies were normal.  Right median, ulnar, radial, and sural antidromic sensory nerve conduction studies were normal.  Right median and ulnar orthodromic (Palmar) mixed nerve conduction studies were normal.    Needle EMG of the right medial gastrocnemius and long head of the biceps femoris showed no abnormal spontaneous activity, and few large, long-duration MUPs, with normal recruitment patterns.  Needle EMG of bilateral mid thoracic paraspinal muscles showed 1+ to 2+ fibrillations/positive waves.  Needle EMG of the right pronator teres showed a complex repetitive discharge, and normal MUP morphology and recruitment patterns.  Needle EMG of the following muscles was normal: Right TA, vastus lateralis, gluteus medius, FDI, triceps, and deltoid. No fasciculations were detected in any muscle.     Interpretation:    Abnormal study.  There is electrodiagnostic evidence of: 1) Mild  chronic inactive right S1 radiculopathy.  This conclusion is based on the presence of mild chronic neurogenic MUP changes at the right long head of biceps femoris and medial gastrocnemius, without abnormal spontaneous activity. 2) One isolated complex repetitive discharge at the right pronator teres muscle.  This finding is not diagnostic in patients over the age of 70, as it can be seen in some healthy individuals.  It may also suggest a chronic right C7 radiculopathy.  Clinical correlation is recommended. 3) abnormal spontaneous activity with mild fibrillations/positive waves of bilateral mid thoracic paraspinal muscles.  The significance of this finding is unclear.  Differential diagnosis includes a thoracic radiculopathy, versus focal anterior myelopathy, vs a focal paraspinal myopathy.   Importantly, the findings of the study do NOT suggest a diffuse motor neuron disease.     ___________________________  Cory Díaz MD        Nerve Conduction Studies  Motor Sites      Latency Amplitude Neg. Amp Diff Segment Distance Velocity Neg. Dur Neg Area Diff Temperature Comment   Site (ms) Norm (mV) Norm %  cm m/s Norm ms %  C    Right Fibular (EDB) Motor   Ankle 5.5  < 6.0 2.9  > 2.0  Ankle-EDB 8   7.1  31.9    Bel Fib Head 13.1 - 2.6 - -10.3 Bel Fib Head-Ankle 33.7 44  > 38 7.9 0 31.9    Pop Fossa 14.8 - 2.4 - -7.7 Pop Fossa-Bel Fib Head 7.5 44  > 38 7.8 -1.83 31.9    Right Median (APB) Motor   Wrist 3.6  < 4.4 8.7  > 5.0  Wrist-APB 8   6.9  35.1    Elbow 7.8 - 8.5  > 5.0 -2.3 Elbow-Wrist 22.2 53  > 48 7.1 -2.1 35    Right Tibial (AHB) Motor   Ankle 4.3  < 6.5 9.3  > 5.0  Ankle-AHB 8   7.2  31.4    Knee 14.2 - 7.4 - -20.4 Knee-Ankle 40 40  > 38 8.6 -21.2 31.2    Right Ulnar (ADM) Motor   Wrist 3.1  < 3.5 9.3  > 5.0  Wrist-ADM 8   5.4  34.2    Bel Elbow 6.3 - 8.4 - -9.7 Bel Elbow-Wrist 19.1 60  > 48 5.7 -2.8 34    Abv Elbow 7.8 - 8.0 - -4.8 Abv Elbow-Bel Elbow 9.1 61  > 48 5.8 0.83 33.9      Sensory Sites       Onset Lat Peak Lat Amp (O-P) Amp (P-P) Segment Distance Velocity Temperature Comment   Site ms ms  V Norm  V  cm m/s Norm  C    Right Median Sensory   Wrist-Dig II 2.9 3.9 48  > 10 62 Wrist-Dig II 14 48  > 48 23.6    Right Median (Ortho) Sensory   Palm-Wrist 1.78 2.3 168 - 184 Palm-Wrist 8 45 - 23.4    Right Median-Ulnar Palmar Sensory        Median   Palm-Wrist 1.78 2.3 168 - 184 Palm-Wrist 8 45 - 23.4         Ulnar   Palm-Wrist 1.68 2.4 24 - 29 Palm-Wrist 8 48 - 23.3    Right Radial Sensory   Forearm-Wrist 1.70 2.7 21  > 15 12 Forearm-Wrist 10 59 - 32.8    Right Sural Sensory   Calf-Lat Mall 3.4 4.6 9  > 5 13 Calf-Lat Mall 14 41  > 38 31.1    Right Ulnar Sensory   Wrist-Dig V 2.5 3.4 26  > 8 35 Wrist-Dig V 12.5 50  > 48 35.5    Right Ulnar (Ortho) Sensory   Palm-Wrist 1.68 2.4 24 - 29 Palm-Wrist 8 48 - 23.3      Inter-Nerve Comparisons     Nerve 1 Value 1 Nerve 2 Value 2 Parameter Result Normal   Sensory Sites   R Median Palm-Wrist 2.3 ms R Ulnar Palm-Wrist 2.4 ms Peak Lat Diff 0.10 ms <0.40       Electromyography     Side Muscle Ins Act Fibs/PSW Fasc HF Amp Dur Poly Recrt Int Pat   Right Tib Anterior Nml None Nml 0 Nml Nml 0 Nml Nml   Right Gastroc MH Nml None Nml 0 1+ 1+ 0 Nml Nml   Right Vastus Lat Nml None Nml 0 Nml Nml 0 Nml Nml   Right Biceps Fem LH Nml None Nml 0 1+ 1+ 0 Nml Nml   Right Thoracic Parasp (Mid) Nml 2+ Nml 0        Right FDI Incr None Nml 0 Nml Nml 0 Nml Nml   Right Triceps Nml None Nml 0 Nml Nml 0 Nml Nml   Right Deltoid Nml None Nml 0 Nml Nml 0 Nml Nml   Right Pronator Teres Nml None Nml CRD Nml Nml 0 Nml Nml   Right Gluteus Med Nml None Nml 0 Nml Nml 0 Nml Nml   Left Thoracic Parasp (Mid) Nml 1+ Nml 0              NCS Waveforms:    Motor                Sensory                           Ultrasound Images:

## 2023-10-25 ENCOUNTER — MYC MEDICAL ADVICE (OUTPATIENT)
Dept: NEUROLOGY | Facility: CLINIC | Age: 73
End: 2023-10-25
Payer: MEDICARE

## 2023-10-25 DIAGNOSIS — M47.24 OSTEOARTHRITIS OF SPINE WITH RADICULOPATHY, THORACIC REGION: Primary | ICD-10-CM

## 2023-10-27 NOTE — TELEPHONE ENCOUNTER
I called the patient 10/27/2023.    Reviewed results. We discussed all results. We will pursue thoracic MRI imaging to investigate the thoracic changes.     Reviewed no need to do other testing. Currently no cervical nor lumbosacral radicular findings.     I will call with MRI T spine results.    Corin Curtis

## 2023-11-10 ENCOUNTER — ANCILLARY PROCEDURE (OUTPATIENT)
Dept: MRI IMAGING | Facility: CLINIC | Age: 73
End: 2023-11-10
Attending: PSYCHIATRY & NEUROLOGY
Payer: MEDICARE

## 2023-11-10 DIAGNOSIS — M47.24 OSTEOARTHRITIS OF SPINE WITH RADICULOPATHY, THORACIC REGION: ICD-10-CM

## 2023-11-10 PROCEDURE — G1010 CDSM STANSON: HCPCS

## 2023-12-09 DIAGNOSIS — F41.9 ANXIETY: ICD-10-CM

## 2023-12-11 RX ORDER — SERTRALINE HYDROCHLORIDE 100 MG/1
TABLET, FILM COATED ORAL
Qty: 90 TABLET | Refills: 1 | Status: SHIPPED | OUTPATIENT
Start: 2023-12-11 | End: 2024-02-08

## 2023-12-14 ENCOUNTER — HOSPITAL ENCOUNTER (OUTPATIENT)
Dept: MAMMOGRAPHY | Facility: CLINIC | Age: 73
Discharge: HOME | End: 2023-12-14
Attending: INTERNAL MEDICINE | Admitting: INTERNAL MEDICINE
Payer: MEDICARE

## 2023-12-14 DIAGNOSIS — Z12.31 VISIT FOR SCREENING MAMMOGRAM: ICD-10-CM

## 2023-12-14 PROCEDURE — 77067 SCR MAMMO BI INCL CAD: CPT

## 2023-12-26 NOTE — PROGRESS NOTES
-- DO NOT REPLY / DO NOT REPLY ALL --  -- Message is from Engagement Center Operations (ECO) --    General Patient Message: Request callback to ask questions     Caller Information         Type Contact Phone/Fax    12/26/2023 08:28 AM CST Phone (Outgoing) LorrieSaritha (Self) [phone] (H)    Left Message     12/26/2023 11:30 AM CST Phone (Incoming) Saritha Andrew (Self) [phone] (E)          Alternative phone number: No    Can a detailed message be left? Yes    Message Turnaround: [place]:    Refer to site's KB page for routing instructions    Please give this turnaround time to the caller:   \"You can expect to receive a response 1-3 business days after your provider's clinical team reviews the message\"               It was a pleasure seeing you for your physical examination.  I wanted to get back to you with your test results.  I have enclosed a copy for your review.      I am happy to report that your cbc or complete blood count is normal with no signs of anemia, leukemia or platelet abnormalities. Your chemistry panel shows no signs of diabetes.  Your blood salts, kidney tests, liver tests, and proteins are all fine.    Your total cholesterol is 230 with the normal range being below 200.  Your HDL or good cholesterol is wonderful at 93 with the normal range being above 50.  Your LDL or bad cholesterol is 126 with the normal range being below 130.  Overall these numbers are very good.    One additional normal result is your hepatitis C test.    I am happy to bring you this overall excellent report.  I believe your health is very good.  If you have any questions please call me.    Jonn Downing M.D.

## 2024-01-08 ENCOUNTER — MYC MEDICAL ADVICE (OUTPATIENT)
Dept: FAMILY MEDICINE | Facility: CLINIC | Age: 74
End: 2024-01-08
Payer: MEDICARE

## 2024-01-08 DIAGNOSIS — K63.5 POLYP OF COLON, UNSPECIFIED PART OF COLON, UNSPECIFIED TYPE: Primary | ICD-10-CM

## 2024-01-08 DIAGNOSIS — Z12.11 COLON CANCER SCREENING: ICD-10-CM

## 2024-01-08 NOTE — TELEPHONE ENCOUNTER
Patient requesting colonoscopy referral.   See MyC--Original GI referral expires on 1/24/2024.    Colonoscopy referral pending---

## 2024-01-10 NOTE — TELEPHONE ENCOUNTER
Sent [employer] message to patient notifying that colonoscopy referral was placed and provided scheduling number.    Janie Jean RN  [facility]

## 2024-01-11 ENCOUNTER — TELEPHONE (OUTPATIENT)
Dept: GASTROENTEROLOGY | Facility: CLINIC | Age: 74
End: 2024-01-11
Payer: MEDICARE

## 2024-01-11 NOTE — TELEPHONE ENCOUNTER
"Endoscopy Scheduling Screen    Have you had a positive Covid test in the last 14 days?  No    Are you active on MyChart?   Yes    What insurance is in the chart?  Other:  Medicare    Ordering/Referring Provider:   NICHOL MENENDEZ        (If ordering provider performs procedure, schedule with ordering provider unless otherwise instructed. )    BMI: Estimated body mass index is 22.2 kg/m  as calculated from the following:    Height as of 7/10/23: 1.727 m (5' 8\").    Weight as of 7/10/23: 66.2 kg (146 lb).     Sedation Ordered  moderate sedation.   If patient BMI > 50 do not schedule in [facility].    If patient BMI > 45 do not schedule at [facility].    Are you taking methadone or Suboxone?  No    Are you taking any prescription medications for pain 3 or more times per week?   NO - No RN review required.    Do you have a history of malignant hyperthermia or adverse reaction to anesthesia?  No    (Females) Are you currently pregnant?   No     Have you been diagnosed or told you have pulmonary hypertension?   No    Do you have an LVAD?  No    Have you been told you have moderate to severe sleep apnea?  No    Have you been told you have COPD, asthma, or any other lung disease?  Yes     What breathing problems do you have?  Asthma     Do you use home oxygen?  No    Have your breathing problems required an ED visit or hospitalization in the last year?  No    Do you have any heart conditions?  No     Have you ever had an organ transplant?   No    Have you ever had or are you awaiting a heart or lung transplant?   No    Have you had a stroke or transient ischemic attack (TIA aka \"mini stroke\" in the last 6 months?   No    Have you been diagnosed with or been told you have cirrhosis of the liver?   No    Are you currently on dialysis?   No    Do you need assistance transferring?   No    BMI: Estimated body mass index is 22.2 kg/m  as calculated from the following:    Height as of 7/10/23: 1.727 m (5' 8\").    Weight as of 7/10/23: 66.2 " kg (146 lb).     Is patients BMI > 40 and scheduling location [facility]?  No    Do you take an injectable medication for weight loss or diabetes (excluding insulin)?  No    Do you take the medication Naltrexone?  No    Do you take blood thinners?  No       Prep   Are you currently on dialysis or do you have chronic kidney disease?  No    Do you have a diagnosis of diabetes?  No    Do you have a diagnosis of cystic fibrosis (CF)?  No    On a regular basis do you go 3 -5 days between bowel movements?  No    BMI > 40?  No    Preferred Pharmacy:        [id number] - [place], [place] - [address] AT [facility] [address]  [facility] [address]  Phone: [phone] Fax: [phone]      Final Scheduling Details   Colonoscopy prep sent?  Standard MiraLAX    Procedure scheduled  Colonoscopy    Surgeon:  Velma     Date of procedure:  2/5     Pre-OP / PAC:   No - Not required for this site.    Location  [facility] - Patient preference.    Sedation   Moderate Sedation - Per order.      Patient Reminders:   You will receive a call from a Nurse to review instructions and health history.  This assessment must be completed prior to your procedure.  Failure to complete the Nurse assessment may result in the procedure being cancelled.      On the day of your procedure, please designate an adult(s) who can drive you home stay with you for the next 24 hours. The medicines used in the exam will make you sleepy. You will not be able to drive.      You cannot take public transportation, ride share services, or non-medical taxi service without a responsible caregiver.  Medical transport services are allowed with the requirement that a responsible caregiver will receive you at your destination.  We require that drivers and caregivers are confirmed prior to your procedure.

## 2024-01-22 ENCOUNTER — TELEPHONE (OUTPATIENT)
Dept: GASTROENTEROLOGY | Facility: CLINIC | Age: 74
End: 2024-01-22
Payer: MEDICARE

## 2024-01-22 NOTE — TELEPHONE ENCOUNTER
Pre assessment completed for upcoming procedure.      Procedure details:    Patient scheduled for Colonoscopy  on 2.5.2024.     Arrival time: 1415. Procedure time 1500    Pre op exam needed? N/A    Facility location: [facility]; [address] [place] [address] [place] [address]    Sedation type: Conscious sedation     Indication for procedure: screening colonoscopy    COVID policy reviewed.    Designated  policy reviewed. Instructed to have someone stay 6 hours post procedure.       Chart review:     Electronic implanted devices? No    Recent diagnosis of diverticulitis within the last 6 weeks?  No    Diabetic? No    Diabetic medication HOLDING recommendations: (if applicable)  Oral diabetic medications: N/A  Diabetic injectables: N/A  Insulin: N/A    Medication review:    Anticoagulants? No    NSAIDS? No NSAID medications per patient's medication list.  RN will verify with pre-assessment call.    Other medication HOLDING recommendations:  N/A      Prep for procedure:     Bowel prep recommendation: Standard Miralax  Due to: standard bowel prep.    Prep instructions sent via [employer]     Reviewed procedure prep instructions.     Patient verbalized understanding and had no questions or concerns at this time.        Honey Dickey RN  Endoscopy Procedure Pre Assessment RN  [phone] option 4

## 2024-02-02 NOTE — TELEPHONE ENCOUNTER
Patient calling in regarding medication questions.     Patient has rx for ativan, states she typically only takes this when she goes to the doctors office. It helps calm her and makes sure her BP is not as high.     Patient asking if she can take this pre procedure. Patient advised per our anesthesia team patient can bring rx with her to appointment, but will need to meet with the physicians and sign consent prior to taking ativan.     Patient was advised to verify with admitting nurse prior to taking medication as well.     Kala Mukherjee RN  Endoscopy Procedure Pre Assessment RN

## 2024-02-03 SDOH — HEALTH STABILITY: PHYSICAL HEALTH: ON AVERAGE, HOW MANY MINUTES DO YOU ENGAGE IN EXERCISE AT THIS LEVEL?: 60 MIN

## 2024-02-03 SDOH — HEALTH STABILITY: PHYSICAL HEALTH: ON AVERAGE, HOW MANY DAYS PER WEEK DO YOU ENGAGE IN MODERATE TO STRENUOUS EXERCISE (LIKE A BRISK WALK)?: 6 DAYS

## 2024-02-03 ASSESSMENT — ASTHMA QUESTIONNAIRES
QUESTION_2 LAST FOUR WEEKS HOW OFTEN HAVE YOU HAD SHORTNESS OF BREATH: NOT AT ALL
QUESTION_4 LAST FOUR WEEKS HOW OFTEN HAVE YOU USED YOUR RESCUE INHALER OR NEBULIZER MEDICATION (SUCH AS ALBUTEROL): NOT AT ALL
ACT_TOTALSCORE: 25
QUESTION_1 LAST FOUR WEEKS HOW MUCH OF THE TIME DID YOUR ASTHMA KEEP YOU FROM GETTING AS MUCH DONE AT WORK, SCHOOL OR AT HOME: NONE OF THE TIME
QUESTION_3 LAST FOUR WEEKS HOW OFTEN DID YOUR ASTHMA SYMPTOMS (WHEEZING, COUGHING, SHORTNESS OF BREATH, CHEST TIGHTNESS OR PAIN) WAKE YOU UP AT NIGHT OR EARLIER THAN USUAL IN THE MORNING: NOT AT ALL
ACT_TOTALSCORE: 25
QUESTION_5 LAST FOUR WEEKS HOW WOULD YOU RATE YOUR ASTHMA CONTROL: COMPLETELY CONTROLLED

## 2024-02-03 ASSESSMENT — SOCIAL DETERMINANTS OF HEALTH (SDOH): HOW OFTEN DO YOU GET TOGETHER WITH FRIENDS OR RELATIVES?: MORE THAN THREE TIMES A WEEK

## 2024-02-03 NOTE — COMMUNITY RESOURCES LIST (ENGLISH)
02/03/2024   [facility]  N/A  For questions about this resource list or additional care needs, please contact your primary care clinic or care manager.  Phone: [phone]   Email: N/A   Address: [address] [place] [id number]   Hours: N/A        Hotlines and Helplines       Hotline - Housing crisis  1  [facility] Distance: 3.42 miles      Phone/Virtual   [address] [place] [address]  Language: English  Hours: Mon - Sun Open 24 Hours   Phone: (145) 601-5593 Email: info@[facility].org Website: http://www.[facility].org     2  [facility] Distance: 3.62 miles      Phone/Virtual   [address] [place] [address]  Language: English  Hours: Mon - Sun Open 24 Hours   Phone: (246) 766-6050 Email: communications@[facility]org Website: https://[facility].org/oursaviourshousing/          Housing       Coordinated Entry access point  3  [facility] [place] ([facility] - [facility] Services Distance: 3.47 miles      In-Person   [address] [place] [address]  Language: English  Hours: Mon - Fri 9:00 AM - 5:00 PM  Fees: Free   Phone: (652) 567-9552 Email: housing@[facility].org Website: http://www.[facility].org/housing     4  [facility] Distance: 4.5 miles      In-Person   [address] [place] [address]  Language: English  Hours: Mon - Sat 10:00 PM - 5:00 PM  Fees: Free   Phone: (626) 552-8565 Email: info@[facility].org Website: http://www.saintolaf.org/     Drop-in center or day shelter  5  [facility] Distance: 3.85 miles      In-Person   [address] [place] [address]  Language: English  Hours: Mon - Fri 12:00 PM - 3:00 PM  Fees: Free   Phone: (345) 208-5314 Email: [employer]comcommunSocialthing@[employer] Website: http://[employer].org/     6  [facility] Distance: 4.03 miles      In-Person   [address], [place] [address]  Language: English, Barbadian, Albanian  Hours: Mon - Sat 7:00 AM - 3:00 PM  Fees: Free, Self Pay   Phone: (719) 211-4579 Email: info@[employer] Website: https://www.[employer].org/locations/opportunity-center/     Housing search assistance  7  [facility] - [facility] Multicultural [facility] Distance: 3.34 miles      Phone/Virtual   [address] [place] [address]  Language: American Sign Language, Kazakh, Maori, English, Chilean, Julian, Oromo, Norwegian, Barbadian, Albanian, Swahili, Andorran, Danish  Hours: Mon - Tue 9:00 AM - 4:00 PM , Wed 10:00 AM - 5:00 PM , Thu - Fri 9:00 AM - 4:00 PM  Fees: Free   Phone: (176) 922-3254 Email: oms@[facility] Website: http://www.[facility]/residents/human-services/multi-cultural-services     8  [facility] Distance: 3.92 miles      In-Person   [address] [place] [address]  Language: English, Barbadian, Albanian  Hours: Mon - Fri 8:30 AM - 4:30 PM  Fees: Free   Phone: (151) 118-1360 Email: siria@[facility].org Website: http://www.[facility].org/     Shelter for families  9  [facility] Distance: 22.17 miles      In-Person   [address] [place] [address]  Language: English  Hours: Mon - Fri 3:00 PM - 9:00 AM , Sat - Sun Open 24 Hours  Fees: Free   Phone: (626) 288-7554 Ext.1 Website: https://www.[facility].org/2020/07/03/emergency-family-shelter/     Shelter for individuals  10  [facility] Distance: 3.62 miles      In-Person   [address] [place] [address]  Language: English  Hours: Mon - Sun Open 24 Hours  Fees: Free   Phone: (884) 692-2859 Email: communications@[facility].org Website: https://[facility]-[place].org/oursaviourshousing/     11  [facility] Distance: 4.74 miles      In-Person   [address] [place] [address]  Language: English  Hours: Mon - Fri 4:00 PM - 9:00 AM  Fees: Free    Phone: (817) 102-4313 Email: juliana@[facility].[employer].org Website: https://[facility].org/[facility]/          Important Numbers & Websites       Emergency Services   911  [facility] Services   311  Poison Control   (195) 775-1523  Suicide Prevention Lifeline   (432) 343-9564 (TALK)  Child Abuse Hotline   (482) 878-8148 (4-A-Child)  Sexual Assault Hotline   (146) 441-6778 (HOPE)  National Runaway Safeline   (565) 596-2604 (RUNAWAY)  All-Options Talkline   (365) 138-6830  Substance Abuse Referral   (842) 392-6929 (HELP)

## 2024-02-04 RX ORDER — ONDANSETRON 2 MG/ML
4 INJECTION INTRAMUSCULAR; INTRAVENOUS
Status: CANCELLED | OUTPATIENT
Start: 2024-02-04

## 2024-02-04 RX ORDER — LIDOCAINE 40 MG/G
CREAM TOPICAL
Status: CANCELLED | OUTPATIENT
Start: 2024-02-04

## 2024-02-05 ENCOUNTER — HOSPITAL ENCOUNTER (OUTPATIENT)
Facility: CLINIC | Age: 74
Discharge: HOME | End: 2024-02-05
Attending: COLON & RECTAL SURGERY | Admitting: COLON & RECTAL SURGERY
Payer: MEDICARE

## 2024-02-05 VITALS
RESPIRATION RATE: 33 BRPM | DIASTOLIC BLOOD PRESSURE: 78 MMHG | SYSTOLIC BLOOD PRESSURE: 117 MMHG | OXYGEN SATURATION: 99 % | HEART RATE: 58 BPM | HEIGHT: 68 IN | WEIGHT: 146 LBS | BODY MASS INDEX: 22.13 KG/M2

## 2024-02-05 LAB — COLONOSCOPY: NORMAL

## 2024-02-05 PROCEDURE — 250N000011 HC RX IP 250 OP 636: Performed by: COLON & RECTAL SURGERY

## 2024-02-05 PROCEDURE — 45378 DIAGNOSTIC COLONOSCOPY: CPT | Performed by: COLON & RECTAL SURGERY

## 2024-02-05 PROCEDURE — G0121 COLON CA SCRN NOT HI RSK IND: HCPCS | Performed by: COLON & RECTAL SURGERY

## 2024-02-05 PROCEDURE — G0500 MOD SEDAT ENDO SERVICE >5YRS: HCPCS | Performed by: COLON & RECTAL SURGERY

## 2024-02-05 RX ORDER — FENTANYL CITRATE 50 UG/ML
INJECTION, SOLUTION INTRAMUSCULAR; INTRAVENOUS PRN
Status: DISCONTINUED | OUTPATIENT
Start: 2024-02-05 | End: 2024-02-05 | Stop reason: HOSPADM

## 2024-02-05 ASSESSMENT — ACTIVITIES OF DAILY LIVING (ADL): ADLS_ACUITY_SCORE: 36

## 2024-02-05 NOTE — H&P
Colon & Rectal Surgery History and Physical  Pre-Endoscopy Procedure Note    History of Present Illness   I have been asked by Dr. Downing to evaluate this 74 year old female for colorectal cancer screening. She currently denies any abdominal pain, weight loss, bleeding per rectum, or recent change in bowel habits.    Past Medical History  Diagnosis Date    Anxiety 05/09/2023    Colonic adenomatous polyp 2012    Dizziness 05/2020    Gross hematuria 2016    Intermittent asthma     Palpitations 12/2018    echo nl    SVT (supraventricular tachycardia) 1994       Past Surgical History  Procedure Laterality Date    CARDIAC SURGERY      ablation for SVT/WPW    ENT SURGERY      [place] teeth, mandibular mass excision        Medications  Medications Prior to Admission   Medication Sig    LORazepam (ATIVAN) 1 MG tablet TAKE 1 TABLET(1 MG) BY MOUTH EVERY 8 HOURS AS NEEDED FOR ANXIETY Strength: 1 mg    multivitamin w/minerals (THERA-VIT-M) tablet Take 1 tablet by mouth daily    sertraline (ZOLOFT) 100 MG tablet TAKE ONE-HALF TABLET BY MOUTH FOR 4 DAYS, THEN INCREASE TO 1 PILL DAILY    EPINEPHrine (ANY BX GENERIC EQUIV) 0.3 MG/0.3ML injection 2-pack INJECT 0.3 MLS(0.3 MG) INTO THE MUSCLE AS NEEDED FOR ANAPHYLAXIS    ketoconazole (NIZORAL) 2 % external cream APPLY TWICE DAILY TO FLARES OF RASH AT ANGLES OF MOUTH    valACYclovir (VALTREX) 500 MG tablet TAKE 4 TABLETS BY MOUTH AT FIRST ONSET SIGN OF COLD SORE. THEN TAKE 4 TABLETS 12 HOURS LATER    vitamin B-Complex Take 1 tablet by mouth daily       Allergies  Allergen Reactions    Iodine-131     Shellfish Allergy Hives        Family History   Family history is not contributory    Social History   She reports that she has never smoked. She has never used smokeless tobacco. She reports current alcohol use of about 7.0 standard drinks of alcohol per week. She reports that she does not use drugs.    Review of Systems   Constitutional:  No fever, weight change or fatigue.    Eyes:  "    No dry eyes or vision changes.   Ears/Nose/Throat/Neck:  No oral ulcers, sore throat or voice change.    Cardiovascular:   No palpitations, syncope, angina or edema.   Respiratory:    No chest pain, excessive sleepiness, shortness of breath or hemoptysis.    Gastrointestinal:   No abdominal pain, nausea, vomiting, diarrhea or heartburn.    Genitourinary:   No dysuria, hematuria, urinary retention or urinary frequency.   Musculoskeletal:  No joint swelling or arthralgias.    Dermatologic:  No skin rash or other skin changes.   Neurologic:    No focal weakness or numbness. No neuropathy.   Psychiatric:    No depression, anxiety, suicidal ideation, or paranoid ideation.   Endocrine:   No cold or heat intolerance, polydipsia, hirsutism, change in libido, or flushing.   Hematology/Lymphatic:  No bleeding or lymphadenopathy.    Allergy/Immunology:  No rhinitis or hives.     Physical Exam   Vitals:  /90, HR 98, RR 12, height 1.727 m (5' 8\"), weight 66.2 kg (146 lb), SpO2 100%, not currently breastfeeding.    General:  Alert and oriented to person, place and time   Airway: Normal oropharyngeal airway and neck mobility   Lungs:  Clear bilaterally   Heart:  Regular rate and rhythm   Abdomen: Soft, NT, ND, no masses   Extremities: Warm, good capillary refill    ASA Grade: II (mild systemic disease)    Impression: Cleared for use of conscious sedation for colorectal cancer screening    Plan: Proceed with colonoscopy     Raine Dean MD  [facility]  [phone]  "

## 2024-02-08 ENCOUNTER — ANCILLARY PROCEDURE (OUTPATIENT)
Dept: GENERAL RADIOLOGY | Facility: CLINIC | Age: 74
End: 2024-02-08
Attending: INTERNAL MEDICINE
Payer: MEDICARE

## 2024-02-08 ENCOUNTER — OFFICE VISIT (OUTPATIENT)
Dept: FAMILY MEDICINE | Facility: CLINIC | Age: 74
End: 2024-02-08
Payer: MEDICARE

## 2024-02-08 VITALS
HEART RATE: 98 BPM | RESPIRATION RATE: 16 BRPM | OXYGEN SATURATION: 99 % | BODY MASS INDEX: 22.73 KG/M2 | TEMPERATURE: 97.6 F | WEIGHT: 150 LBS | SYSTOLIC BLOOD PRESSURE: 111 MMHG | HEIGHT: 68 IN | DIASTOLIC BLOOD PRESSURE: 73 MMHG

## 2024-02-08 DIAGNOSIS — R05.3 CHRONIC COUGH: ICD-10-CM

## 2024-02-08 DIAGNOSIS — J45.20 INTERMITTENT ASTHMA, UNCOMPLICATED: ICD-10-CM

## 2024-02-08 DIAGNOSIS — F41.9 ANXIETY: ICD-10-CM

## 2024-02-08 DIAGNOSIS — Z78.0 ASYMPTOMATIC MENOPAUSAL STATE: ICD-10-CM

## 2024-02-08 DIAGNOSIS — I47.10 SVT (SUPRAVENTRICULAR TACHYCARDIA) (H): ICD-10-CM

## 2024-02-08 DIAGNOSIS — Z00.00 MEDICARE ANNUAL WELLNESS VISIT, SUBSEQUENT: Primary | ICD-10-CM

## 2024-02-08 DIAGNOSIS — K63.5 POLYP OF COLON, UNSPECIFIED PART OF COLON, UNSPECIFIED TYPE: ICD-10-CM

## 2024-02-08 LAB
ERYTHROCYTE [DISTWIDTH] IN BLOOD BY AUTOMATED COUNT: 12.9 % (ref 10–15)
HCT VFR BLD AUTO: 41.2 % (ref 35–47)
HGB BLD-MCNC: 13.4 G/DL (ref 11.7–15.7)
MCH RBC QN AUTO: 30.2 PG (ref 26.5–33)
MCHC RBC AUTO-ENTMCNC: 32.5 G/DL (ref 31.5–36.5)
MCV RBC AUTO: 93 FL (ref 78–100)
PLATELET # BLD AUTO: 345 10E3/UL (ref 150–450)
RBC # BLD AUTO: 4.44 10E6/UL (ref 3.8–5.2)
WBC # BLD AUTO: 6.9 10E3/UL (ref 4–11)

## 2024-02-08 PROCEDURE — 36415 COLL VENOUS BLD VENIPUNCTURE: CPT | Performed by: INTERNAL MEDICINE

## 2024-02-08 PROCEDURE — 80053 COMPREHEN METABOLIC PANEL: CPT | Performed by: INTERNAL MEDICINE

## 2024-02-08 PROCEDURE — G0439 PPPS, SUBSEQ VISIT: HCPCS | Performed by: INTERNAL MEDICINE

## 2024-02-08 PROCEDURE — 99213 OFFICE O/P EST LOW 20 MIN: CPT | Mod: 25 | Performed by: INTERNAL MEDICINE

## 2024-02-08 PROCEDURE — 85027 COMPLETE CBC AUTOMATED: CPT | Performed by: INTERNAL MEDICINE

## 2024-02-08 PROCEDURE — 80061 LIPID PANEL: CPT | Performed by: INTERNAL MEDICINE

## 2024-02-08 PROCEDURE — 71046 X-RAY EXAM CHEST 2 VIEWS: CPT | Mod: TC | Performed by: RADIOLOGY

## 2024-02-08 RX ORDER — SERTRALINE HYDROCHLORIDE 100 MG/1
TABLET, FILM COATED ORAL
Qty: 90 TABLET | Refills: 3 | Status: SHIPPED | OUTPATIENT
Start: 2024-02-08

## 2024-02-08 ASSESSMENT — PAIN SCALES - GENERAL: PAINLEVEL: NO PAIN (0)

## 2024-02-08 NOTE — PROGRESS NOTES
Preventive Care Visit  [facility] DOLORES Downing MD, Internal Medicine  Feb 8, 2024        Kali Dow is a 74 year old, presenting for the following:    She is doing very well and her fasciculations are much decreased.  Her anxiety is under good control.  Her daughter is now living at her home and has some medical issues which can be stressful.  She otherwise feels well and exercises regularly.  She does not see gynecology.    She has had an intermittent dry cough for the last 3 months.  More when she lies down.  No chest pain or shortness of breath               Past Medical History:      Past Medical History:   Diagnosis Date    Anxiety 05/09/2023    added zoloft    Colonic polyp 2012    adenomatous, fu 5 years; fu 9/18 one hyper polyp; fu done 2/24 and nl    Dizziness 05/2020    est echo nl    Gross hematuria 2016    ct and cysto via Dr. Watt    Intermittent asthma     Palpitations 12/2018    echo nl    SVT (supraventricular tachycardia) 1994    successful catheter ablation done [facility]             Past Surgical History:      Past Surgical History:   Procedure Laterality Date    CARDIAC SURGERY      ablation for SVT/WPW    COLONOSCOPY N/A 9/20/2018    Procedure: COMBINED COLONOSCOPY, SINGLE OR MULTIPLE BIOPSY/POLYPECTOMY BY BIOPSY;  colonoscopy;  Surgeon: Tre Carreon MD;  Location:  GI    COLONOSCOPY N/A 2/5/2024    Procedure: Colonoscopy;  Surgeon: Raine Dean MD;  Location:  GI    ENT SURGERY      [place] teeth, mandibular mass excision    wpw ablation surgery  41             Social History:     Social History     Socioeconomic History    Marital status:      Spouse name: Not on file    Number of children: 2    Years of education: Not on file    Highest education level: Not on file   Occupational History    Occupation: physical therapist, works admin     Employer: [employer]     Comment: retired   Tobacco Use    Smoking status: Never    Smokeless  tobacco: Never   Substance and Sexual Activity    Alcohol use: Yes     Alcohol/week: 7.0 standard drinks of alcohol     Types: 7 Standard drinks or equivalent per week     Comment: one wine a night    Drug use: No    Sexual activity: Yes     Partners: Male   Other Topics Concern    Parent/sibling w/ CABG, MI or angioplasty before 65F 55M? Not Asked   Social History Narrative    Not on file     Social Determinants of Health     Financial Resource Strain: Low Risk  (2/3/2024)    Financial Resource Strain     Within the past 12 months, have you or your family members you live with been unable to get utilities (heat, electricity) when it was really needed?: No   Food Insecurity: Low Risk  (2/3/2024)    Food Insecurity     Within the past 12 months, did you worry that your food would run out before you got money to buy more?: No     Within the past 12 months, did the food you bought just not last and you didn t have money to get more?: No   Transportation Needs: Low Risk  (2/3/2024)    Transportation Needs     Within the past 12 months, has lack of transportation kept you from medical appointments, getting your medicines, non-medical meetings or appointments, work, or from getting things that you need?: No   Physical Activity: Sufficiently Active (2/3/2024)    Exercise Vital Sign     Days of Exercise per Week: 6 days     Minutes of Exercise per Session: 60 min   Stress: Not on file   Social Connections: Unknown (2/3/2024)    Social Connection and Isolation Panel [NHANES]     Frequency of Communication with Friends and Family: Not on file     Frequency of Social Gatherings with Friends and Family: More than three times a week     Attends Baptism Services: Not on file     Active Member of Clubs or Organizations: Not on file     Attends Club or Organization Meetings: Not on file     Marital Status: Not on file   Interpersonal Safety: Not on file   Housing Stability: High Risk (2/3/2024)    Housing Stability     Do you  "have housing? : No     Are you worried about losing your housing?: No             Family History:   reviewed         Allergies:     Allergies   Allergen Reactions    Iodine-131     Shellfish Allergy Hives             Medications:     Current Outpatient Medications   Medication Sig Dispense Refill    EPINEPHrine (ANY BX GENERIC EQUIV) 0.3 MG/0.3ML injection 2-pack INJECT 0.3 MLS(0.3 MG) INTO THE MUSCLE AS NEEDED FOR ANAPHYLAXIS 0.6 mL 0    ketoconazole (NIZORAL) 2 % external cream APPLY TWICE DAILY TO FLARES OF RASH AT ANGLES OF MOUTH      LORazepam (ATIVAN) 1 MG tablet TAKE 1 TABLET(1 MG) BY MOUTH EVERY 8 HOURS AS NEEDED FOR ANXIETY Strength: 1 mg 10 tablet 0    multivitamin w/minerals (THERA-VIT-M) tablet Take 1 tablet by mouth daily      sertraline (ZOLOFT) 100 MG tablet TAKE ONE-HALF TABLET BY MOUTH FOR 4 DAYS, THEN INCREASE TO 1 PILL DAILY 90 tablet 3    valACYclovir (VALTREX) 500 MG tablet TAKE 4 TABLETS BY MOUTH AT FIRST ONSET SIGN OF COLD SORE. THEN TAKE 4 TABLETS 12 HOURS LATER                 Review of Systems:     The 10 point Review of Systems is negative other than noted in the HPI           Physical Exam:   Blood pressure 111/73, pulse 98, temperature 97.6  F (36.4  C), temperature source Temporal, resp. rate 16, height 1.727 m (5' 8\"), weight 68 kg (150 lb), SpO2 99%, not currently breastfeeding.    Exam:  Constitutional: healthy appearing, alert and in no distress  Heent: Normocephalic. Head without obvious masses or lesions. PERRLDC, EOMI. Mouth exam within normal limits: tongue, mucous membranes, posterior pharynx all normal, no lesions or abnormalities seen.  Tm's and canals within normal limits bilaterally. Neck supple, no nuchal rigidity or masses. No supraclavicular, or cervical adenopathy. Thyroid symmetric, no masses.  Cardiovascular: Regular rate and rhythm, no murmer, rub or gallops.  JVP not elevated, no edema.  Carotids within normal limits bilaterally, no bruits.  Respiratory: Normal " respiratory effort.  Lungs clear, normal flow, no wheezing or crackles.  Breasts: Normal bilaterally.  No masses or lesions.  Nipples within normal limits.  No axillary lesions or nodes.  My M.A. Was present during this part of the examination.  Gastrointestinal: Normal active bowel sounds.   Soft, not tender, no masses, guarding or rebound.  No hepatosplenomegaly.   Musculoskeletal: extremities normal, no gross deformities noted.  Skin: no suspicious lesions or rashes   Neurologic: Mental status within normal limits.  Speech fluent.  No gross motor abnormalities and gait intact.  Psychiatric: mentation appears normal and affect normal.         Data:   Labs sent; cxr to be done        Assessment:   Normal complete physical exam  Chronic cough of approximately 3 months duration, check chest x-ray and if negative could try treatment for asthma.  Colon polyp, up-to-date on follow-up  SVT, no issues  Anxiety, controlled.  We discussed trying a half a pill a day if she would like.  Asthma, no issues  Healthcare maintenance         Plan:   Up-to-date on immunizations  Chest x-ray as above  Bone density test  Letter with labs  Exercise and diet      Jonn Downing M.D.          No chief complaint on file.          Via the Health Maintenance questionnaire, the patient has reported the following services have been completed -Colonscopy, this information has been sent to the abstraction team.  Health Care Directive  Patient does not have a Health Care Directive or Living Will:     HPI              2/3/2024   General Health   How would you rate your overall physical health? Excellent         2/3/2024   Nutrition   Diet: Regular (no restrictions)         2/3/2024   Exercise   Days per week of moderate/strenous exercise 6 days   Average minutes spent exercising at this level 60 min         2/3/2024   Social Factors   Frequency of gathering with friends or relatives More than three times a week   Worry food won't last until get  money to buy more No   Food not last or not have enough money for food? No   Do you have housing?  No   Are you worried about losing your housing? No   Lack of transportation? No   Unable to get utilities (heat,electricity)? No   Want help with housing or utility concern? No   (!) HOUSING CONCERN PRESENT      2/3/2024   Fall Risk   Fallen 2 or more times in the past year? No   Trouble with walking or balance? No          2/3/2024   Activities of Daily Living- Home Safety   Needs help with the following daily activites None of the above   Safety concerns in the home None of the above         2/3/2024   Dental   Dentist two times every year? Yes         2/3/2024   Hearing Screening   Hearing concerns? None of the above         2/3/2024   Driving Risk Screening   Patient/family members have concerns about driving No         2/3/2024   General Alertness/Fatigue Screening   Have you been more tired than usual lately? No         2/3/2024   Urinary Incontinence Screening   Bothered by leaking urine in past 6 months No         2/3/2024   TB Screening   Were you born outside of [place]?  No               2/3/2024   Substance Use   Alcohol more than 3/day or more than 7/wk No   Do you have a current opioid prescription? No   How severe/bad is pain from 1 to 10? 0/10 (No Pain)   Do you use any other substances recreationally? No     Social History     Tobacco Use    Smoking status: Never    Smokeless tobacco: Never   Substance Use Topics    Alcohol use: Yes     Alcohol/week: 7.0 standard drinks of alcohol     Types: 7 Standard drinks or equivalent per week     Comment: one wine a night    Drug use: No            No data to display                     The 10-year ASCVD risk score (Juan A LUCIO, et al., 2019) is: 13.9%    Values used to calculate the score:      Age: 74 years      Sex: Female      Is Non- : No      Diabetic: No      Tobacco smoker: No      Systolic Blood Pressure: 117 mmHg      Is BP treated:  "No      HDL Cholesterol: 80 mg/dL      Total Cholesterol: 238 mg/dL            Reviewed and updated as needed this visit by Provider                      Current providers sharing in care for this patient include:  Patient Care Team:  Jonn Downing MD as PCP - General (Internal Medicine)  Jonn Downing MD as Assigned PCP  Edwardo Ac MD as MD (Allergy & Immunology)  Edwardo Ac MD as Assigned Allergy Provider  Cory Díaz MD as Assigned Neuroscience Provider    The following health maintenance items are reviewed in Epic and correct as of today:  Health Maintenance   Topic Date Due    ANNUAL REVIEW OF HM ORDERS  Never done    IPV IMMUNIZATION (2 of 3 - Adult catch-up series) 07/17/2008    RSV VACCINE (Pregnancy & 60+) (1 - 1-dose 60+ series) Never done    ASTHMA ACTION PLAN  02/11/2014    DEXA  12/20/2020    PHQ-2 (once per calendar year)  01/01/2024    MEDICARE ANNUAL WELLNESS VISIT  01/24/2024    ASTHMA CONTROL TEST  08/08/2024    FALL RISK ASSESSMENT  02/08/2025    DTAP/TDAP/TD IMMUNIZATION (5 - Td or Tdap) 05/03/2026    GLUCOSE  06/21/2026    LIPID  01/24/2028    ADVANCE CARE PLANNING  01/24/2028    COLORECTAL CANCER SCREENING  02/05/2029    HEPATITIS C SCREENING  Completed    INFLUENZA VACCINE  Completed    Pneumococcal Vaccine: 65+ Years  Completed    ZOSTER IMMUNIZATION  Completed    COVID-19 Vaccine  Completed    HPV IMMUNIZATION  Aged Out    MENINGITIS IMMUNIZATION  Aged Out    RSV MONOCLONAL ANTIBODY  Aged Out    MAMMO SCREENING  Discontinued     Review of Systems       Objective    Exam  There were no vitals taken for this visit.   Estimated body mass index is 22.2 kg/m  as calculated from the following:    Height as of 2/5/24: 1.727 m (5' 8\").    Weight as of 2/5/24: 66.2 kg (146 lb).    Physical Exam          2/8/2024   Mini Cog   Clock Draw Score 2 Normal   3 Item Recall 3 objects recalled   Mini Cog Total Score 5            Signed Electronically " by: Jonn Downing MD

## 2024-02-08 NOTE — LETTER
February 9, 2024      Paloma CABELLO Herbie Fitch  [address]  [facility] [address]        Dear Ms.Tarbuck Fitch,    We are writing to inform you of your test results.    It was a pleasure seeing you for your physical examination.  I wanted to get back to you with your test results.  I have enclosed a copy for your review.     I am happy to report that your cbc or complete blood count is normal with no signs of anemia, leukemia or platelet abnormalities. Your chemistry panel shows no signs of diabetes.  Your blood salts, kidney tests, liver tests, and proteins are all fine.     Your total cholesterol is 238 with the normal range being below 200.  Your HDL or good cholesterol is 83 with the normal range being above 50.  Your LDL or bad cholesterol is 137 with the normal range being below 130.  Given the large amount of HDL cholesterol I do not think there are any concerns.  Please be sure to exercise and eat a healthy diet.     I am happy to bring you this excellent report.  Please let me know if you have questions.     Resulted Orders   CBC with platelets   Result Value Ref Range    WBC Count 6.9 4.0 - 11.0 10e3/uL    RBC Count 4.44 3.80 - 5.20 10e6/uL    Hemoglobin 13.4 11.7 - 15.7 g/dL    Hematocrit 41.2 35.0 - 47.0 %    MCV 93 78 - 100 fL    MCH 30.2 26.5 - 33.0 pg    MCHC 32.5 31.5 - 36.5 g/dL    RDW 12.9 10.0 - 15.0 %    Platelet Count 345 150 - 450 10e3/uL   Comprehensive metabolic panel   Result Value Ref Range    Sodium 136 135 - 145 mmol/L      Comment:      Reference intervals for this test were updated on 09/26/2023 to more accurately reflect our healthy population. There may be differences in the flagging of prior results with similar values performed with this method. Interpretation of those prior results can be made in the context of the updated reference intervals.     Potassium 4.3 3.4 - 5.3 mmol/L    Carbon Dioxide (CO2) 26 22 - 29 mmol/L    Anion Gap 10 7 - 15 mmol/L    Urea Nitrogen 16.7  8.0 - 23.0 mg/dL    Creatinine 0.81 0.51 - 0.95 mg/dL    GFR Estimate 75 >60 mL/min/1.73m2    Calcium 9.4 8.8 - 10.2 mg/dL    Chloride 100 98 - 107 mmol/L    Glucose 99 70 - 99 mg/dL    Alkaline Phosphatase 52 40 - 150 U/L      Comment:      Reference intervals for this test were updated on 11/14/2023 to more accurately reflect our healthy population. There may be differences in the flagging of prior results with similar values performed with this method. Interpretation of those prior results can be made in the context of the updated reference intervals.    AST 26 0 - 45 U/L      Comment:      Reference intervals for this test were updated on 6/12/2023 to more accurately reflect our healthy population. There may be differences in the flagging of prior results with similar values performed with this method. Interpretation of those prior results can be made in the context of the updated reference intervals.    ALT 19 0 - 50 U/L      Comment:      Reference intervals for this test were updated on 6/12/2023 to more accurately reflect our healthy population. There may be differences in the flagging of prior results with similar values performed with this method. Interpretation of those prior results can be made in the context of the updated reference intervals.      Protein Total 7.7 6.4 - 8.3 g/dL    Albumin 4.3 3.5 - 5.2 g/dL    Bilirubin Total 0.3 <=1.2 mg/dL   Lipid panel reflex to direct LDL Fasting   Result Value Ref Range    Cholesterol 238 (H) <200 mg/dL    Triglycerides 91 <150 mg/dL    Direct Measure HDL 83 >=50 mg/dL    LDL Cholesterol Calculated 137 (H) <=100 mg/dL    Non HDL Cholesterol 155 (H) <130 mg/dL    Patient Fasting > 8hrs? No     Narrative    Cholesterol  Desirable:  <200 mg/dL    Triglycerides  Normal:  Less than 150 mg/dL  Borderline High:  150-199 mg/dL  High:  200-499 mg/dL  Very High:  Greater than or equal to 500 mg/dL    Direct Measure HDL  Female:  Greater than or equal to 50 mg/dL   Male:   Greater than or equal to 40 mg/dL    LDL Cholesterol  Desirable:  <100mg/dL  Above Desirable:  100-129 mg/dL   Borderline High:  130-159 mg/dL   High:  160-189 mg/dL   Very High:  >= 190 mg/dL    Non HDL Cholesterol  Desirable:  130 mg/dL  Above Desirable:  130-159 mg/dL  Borderline High:  160-189 mg/dL  High:  190-219 mg/dL  Very High:  Greater than or equal to 220 mg/dL       If you have any questions or concerns, please call the clinic at the number listed above.       Sincerely,      Jonn Downing MD

## 2024-02-08 NOTE — RESULT ENCOUNTER NOTE
It was nice seeing you today.  Your chest x-ray is clear.  If you would like we could try an inhaler for your cough.  Please let me know if you would like to start that.    Jonn Downing M.D.

## 2024-02-09 LAB
ALBUMIN SERPL BCG-MCNC: 4.3 G/DL (ref 3.5–5.2)
ALP SERPL-CCNC: 52 U/L (ref 40–150)
ALT SERPL W P-5'-P-CCNC: 19 U/L (ref 0–50)
ANION GAP SERPL CALCULATED.3IONS-SCNC: 10 MMOL/L (ref 7–15)
AST SERPL W P-5'-P-CCNC: 26 U/L (ref 0–45)
BILIRUB SERPL-MCNC: 0.3 MG/DL
BUN SERPL-MCNC: 16.7 MG/DL (ref 8–23)
CALCIUM SERPL-MCNC: 9.4 MG/DL (ref 8.8–10.2)
CHLORIDE SERPL-SCNC: 100 MMOL/L (ref 98–107)
CHOLEST SERPL-MCNC: 238 MG/DL
CREAT SERPL-MCNC: 0.81 MG/DL (ref 0.51–0.95)
DEPRECATED HCO3 PLAS-SCNC: 26 MMOL/L (ref 22–29)
EGFRCR SERPLBLD CKD-EPI 2021: 75 ML/MIN/1.73M2
FASTING STATUS PATIENT QL REPORTED: NO
GLUCOSE SERPL-MCNC: 99 MG/DL (ref 70–99)
HDLC SERPL-MCNC: 83 MG/DL
LDLC SERPL CALC-MCNC: 137 MG/DL
NONHDLC SERPL-MCNC: 155 MG/DL
POTASSIUM SERPL-SCNC: 4.3 MMOL/L (ref 3.4–5.3)
PROT SERPL-MCNC: 7.7 G/DL (ref 6.4–8.3)
SODIUM SERPL-SCNC: 136 MMOL/L (ref 135–145)
TRIGL SERPL-MCNC: 91 MG/DL

## 2024-02-09 NOTE — RESULT ENCOUNTER NOTE
It was a pleasure seeing you for your physical examination.  I wanted to get back to you with your test results.  I have enclosed a copy for your review.     I am happy to report that your cbc or complete blood count is normal with no signs of anemia, leukemia or platelet abnormalities. Your chemistry panel shows no signs of diabetes.  Your blood salts, kidney tests, liver tests, and proteins are all fine.    Your total cholesterol is 238 with the normal range being below 200.  Your HDL or good cholesterol is 83 with the normal range being above 50.  Your LDL or bad cholesterol is 137 with the normal range being below 130.  Given the large amount of HDL cholesterol I do not think there are any concerns.  Please be sure to exercise and eat a healthy diet.    I am happy to bring you this excellent report.  Please let me know if you have questions.    Jonn Downing M.D.

## 2024-02-20 ENCOUNTER — HOSPITAL ENCOUNTER (OUTPATIENT)
Dept: BONE DENSITY | Facility: CLINIC | Age: 74
Discharge: HOME | End: 2024-02-20
Attending: INTERNAL MEDICINE | Admitting: INTERNAL MEDICINE
Payer: MEDICARE

## 2024-02-20 DIAGNOSIS — Z78.0 ASYMPTOMATIC MENOPAUSAL STATE: ICD-10-CM

## 2024-02-20 PROCEDURE — 77080 DXA BONE DENSITY AXIAL: CPT

## 2024-02-22 NOTE — RESULT ENCOUNTER NOTE
I am happy to report that your bone density test is normal.  Nothing needs to be done and we can repeat this in 5 to 10 years.    Let me know if you have questions.    Jonn Downing M.D.

## 2024-04-13 ENCOUNTER — HOSPITAL ENCOUNTER (EMERGENCY)
Facility: CLINIC | Age: 74
Discharge: HOME | End: 2024-04-14
Attending: EMERGENCY MEDICINE | Admitting: EMERGENCY MEDICINE
Payer: MEDICARE

## 2024-04-13 ENCOUNTER — APPOINTMENT (OUTPATIENT)
Dept: ULTRASOUND IMAGING | Facility: CLINIC | Age: 74
End: 2024-04-13
Attending: EMERGENCY MEDICINE
Payer: MEDICARE

## 2024-04-13 DIAGNOSIS — M71.22 SYNOVIAL CYST OF LEFT POPLITEAL SPACE: ICD-10-CM

## 2024-04-13 DIAGNOSIS — S80.12XA LEG HEMATOMA, LEFT, INITIAL ENCOUNTER: ICD-10-CM

## 2024-04-13 LAB
ANION GAP SERPL CALCULATED.3IONS-SCNC: 11 MMOL/L (ref 7–15)
BASOPHILS # BLD AUTO: 0 10E3/UL (ref 0–0.2)
BASOPHILS NFR BLD AUTO: 0 %
BUN SERPL-MCNC: 12.8 MG/DL (ref 8–23)
CALCIUM SERPL-MCNC: 9 MG/DL (ref 8.8–10.2)
CHLORIDE SERPL-SCNC: 102 MMOL/L (ref 98–107)
CREAT SERPL-MCNC: 0.81 MG/DL (ref 0.51–0.95)
DEPRECATED HCO3 PLAS-SCNC: 26 MMOL/L (ref 22–29)
EGFRCR SERPLBLD CKD-EPI 2021: 75 ML/MIN/1.73M2
EOSINOPHIL # BLD AUTO: 0.2 10E3/UL (ref 0–0.7)
EOSINOPHIL NFR BLD AUTO: 2 %
ERYTHROCYTE [DISTWIDTH] IN BLOOD BY AUTOMATED COUNT: 13.7 % (ref 10–15)
GLUCOSE SERPL-MCNC: 118 MG/DL (ref 70–99)
HCT VFR BLD AUTO: 36.6 % (ref 35–47)
HGB BLD-MCNC: 12.2 G/DL (ref 11.7–15.7)
IMM GRANULOCYTES # BLD: 0 10E3/UL
IMM GRANULOCYTES NFR BLD: 0 %
LYMPHOCYTES # BLD AUTO: 1.7 10E3/UL (ref 0.8–5.3)
LYMPHOCYTES NFR BLD AUTO: 18 %
MCH RBC QN AUTO: 30.2 PG (ref 26.5–33)
MCHC RBC AUTO-ENTMCNC: 33.3 G/DL (ref 31.5–36.5)
MCV RBC AUTO: 91 FL (ref 78–100)
MONOCYTES # BLD AUTO: 0.9 10E3/UL (ref 0–1.3)
MONOCYTES NFR BLD AUTO: 10 %
NEUTROPHILS # BLD AUTO: 6.2 10E3/UL (ref 1.6–8.3)
NEUTROPHILS NFR BLD AUTO: 70 %
NRBC # BLD AUTO: 0 10E3/UL
NRBC BLD AUTO-RTO: 0 /100
PLATELET # BLD AUTO: 320 10E3/UL (ref 150–450)
POTASSIUM SERPL-SCNC: 3.8 MMOL/L (ref 3.4–5.3)
RBC # BLD AUTO: 4.04 10E6/UL (ref 3.8–5.2)
SODIUM SERPL-SCNC: 139 MMOL/L (ref 135–145)
WBC # BLD AUTO: 9.1 10E3/UL (ref 4–11)

## 2024-04-13 PROCEDURE — 85025 COMPLETE CBC W/AUTO DIFF WBC: CPT | Performed by: EMERGENCY MEDICINE

## 2024-04-13 PROCEDURE — 36415 COLL VENOUS BLD VENIPUNCTURE: CPT | Performed by: EMERGENCY MEDICINE

## 2024-04-13 PROCEDURE — 80048 BASIC METABOLIC PNL TOTAL CA: CPT | Performed by: EMERGENCY MEDICINE

## 2024-04-13 PROCEDURE — 99284 EMERGENCY DEPT VISIT MOD MDM: CPT | Mod: 25

## 2024-04-13 PROCEDURE — 93971 EXTREMITY STUDY: CPT | Mod: LT

## 2024-04-13 ASSESSMENT — COLUMBIA-SUICIDE SEVERITY RATING SCALE - C-SSRS
2. HAVE YOU ACTUALLY HAD ANY THOUGHTS OF KILLING YOURSELF IN THE PAST MONTH?: NO
1. IN THE PAST MONTH, HAVE YOU WISHED YOU WERE DEAD OR WISHED YOU COULD GO TO SLEEP AND NOT WAKE UP?: NO
6. HAVE YOU EVER DONE ANYTHING, STARTED TO DO ANYTHING, OR PREPARED TO DO ANYTHING TO END YOUR LIFE?: NO

## 2024-04-13 ASSESSMENT — ACTIVITIES OF DAILY LIVING (ADL): ADLS_ACUITY_SCORE: 34

## 2024-04-14 VITALS
RESPIRATION RATE: 16 BRPM | SYSTOLIC BLOOD PRESSURE: 142 MMHG | OXYGEN SATURATION: 96 % | TEMPERATURE: 97.2 F | HEART RATE: 83 BPM | HEIGHT: 68 IN | WEIGHT: 147 LBS | BODY MASS INDEX: 22.28 KG/M2 | DIASTOLIC BLOOD PRESSURE: 66 MMHG

## 2024-04-14 RX ORDER — OXYCODONE HYDROCHLORIDE 5 MG/1
5 TABLET ORAL EVERY 6 HOURS PRN
Qty: 6 TABLET | Refills: 0 | Status: SHIPPED | OUTPATIENT
Start: 2024-04-14 | End: 2024-04-17

## 2024-04-14 NOTE — ED TRIAGE NOTES
Patient reports left lower extremity swelling that started on Tuesday. The swelling has been increasing and the pain has been increasing. The patient went to [place], landing on the 2nd and returned home tonight. No history of blood clots. Patient is not on blood thinners.     Triage Assessment (Adult)       Row Name 04/13/24 2228          Triage Assessment    Airway WDL WDL        Respiratory WDL    Respiratory WDL WDL        Cardiac WDL    Cardiac WDL WDL        Cognitive/Neuro/Behavioral WDL    Cognitive/Neuro/Behavioral WDL WDL

## 2024-04-14 NOTE — ED PROVIDER NOTES
History     Chief Complaint:  Leg Swelling       HPI   Paloma Fitch is a 74 year old female with known history of anxiety and SVT presenting with leg swelling.  Patient reports returning from [place] roughly 2 hours prior to arrival.  She reports roughly 4 days ago she noted developing increasing swelling to her left ankle that has only continued to worsen.  Initially she did not describe any pain though now reports calf pain.  She took an aspirin prior to arrival.  No fever, chills, dyspnea, chest pain, abdominal pain, urinary symptoms, paresthesias or other symptoms.  No recent trauma to her knowledge.  No history of cancer, hormone use or blood clots.      Independent Historian:   None - Patient Only    Review of External Notes:   none       Medications:    EPINEPHrine (ANY BX GENERIC EQUIV) 0.3 MG/0.3ML injection 2-pack  ketoconazole (NIZORAL) 2 % external cream  LORazepam (ATIVAN) 1 MG tablet  multivitamin w/minerals (THERA-VIT-M) tablet  sertraline (ZOLOFT) 100 MG tablet  valACYclovir (VALTREX) 500 MG tablet        Past Medical History:    Past Medical History:   Diagnosis Date    Anxiety 05/09/2023    Colonic polyp 2012    Dizziness 05/2020    Gross hematuria 2016    Intermittent asthma     Palpitations 12/2018    Screening for osteoporosis 02/2024    SVT (supraventricular tachycardia) 1994       Past Surgical History:    Past Surgical History:   Procedure Laterality Date    CARDIAC SURGERY      ablation for SVT/WPW    COLONOSCOPY N/A 9/20/2018    Procedure: COMBINED COLONOSCOPY, SINGLE OR MULTIPLE BIOPSY/POLYPECTOMY BY BIOPSY;  colonoscopy;  Surgeon: Tre Carreon MD;  Location:  GI    COLONOSCOPY N/A 2/5/2024    Procedure: Colonoscopy;  Surgeon: Raine Dean MD;  Location:  GI    ENT SURGERY      wisdom teeth, mandibular mass excision    wpw ablation surgery  41        Physical Exam   Patient Vitals for the past 24 hrs:   BP Temp Temp src Pulse Resp SpO2 Height Weight  "  04/13/24 2227 (!) 179/88 97.2  F (36.2  C) Temporal 108 18 99 % 1.727 m (5' 8\") 66.7 kg (147 lb)        Physical Exam  Nursing note and vitals reviewed.  Constitutional: Well nourished.   Eyes: Conjunctiva normal.  Pupils are equal, round, and reactive to light.   ENT: Nose normal. Mucous membranes pink and moist.    Neck: Normal range of motion.  CVS: Normal rate, regular rhythm.  Normal heart sounds.  2/2 DP pulses.  Pulmonary: Lungs clear to auscultation bilaterally. No wheezes/rales/rhonchi.  GI: Abdomen soft. Nontender, nondistended. No rigidity or guarding.    MSK: LLE greater circumference than RLE; mild L. Calf tenderness; soft compartment, no overlying erythema/warmth. 3 bruises of various staging to LLE. No RLE swelling/calf tenderness. Full active ROM L. Ankle/knee  Neuro: Alert. Follows simple commands. Sensation intact x 4.   Skin: Skin is warm and dry. No rash noted.   Psychiatric: Normal affect.       Emergency Department Course     Laboratory:  Labs Ordered and Resulted from Time of ED Arrival to Time of ED Departure   BASIC METABOLIC PANEL - Abnormal       Result Value    Sodium 139      Potassium 3.8      Chloride 102      Carbon Dioxide (CO2) 26      Anion Gap 11      Urea Nitrogen 12.8      Creatinine 0.81      GFR Estimate 75      Calcium 9.0      Glucose 118 (*)    CBC WITH PLATELETS AND DIFFERENTIAL    WBC Count 9.1      RBC Count 4.04      Hemoglobin 12.2      Hematocrit 36.6      MCV 91      MCH 30.2      MCHC 33.3      RDW 13.7      Platelet Count 320      % Neutrophils 70      % Lymphocytes 18      % Monocytes 10      % Eosinophils 2      % Basophils 0      % Immature Granulocytes 0      NRBCs per 100 WBC 0      Absolute Neutrophils 6.2      Absolute Lymphocytes 1.7      Absolute Monocytes 0.9      Absolute Eosinophils 0.2      Absolute Basophils 0.0      Absolute Immature Granulocytes 0.0      Absolute NRBCs 0.0        Imaging  US Lower Extremity Venous Duplex Left   Final Result "   IMPRESSION:   1.  No deep venous thrombosis in the left lower extremity.   2.  Complex popliteal fossa cyst.   3.  Complex fluid collection left calf at the site of pain likely represents a hematoma.              Emergency Department Course & Assessments:    Interventions:  Medications - No data to display       Independent Interpretation (X-rays, CTs, rhythm strip):  None    Consultations/Discussion of Management or Tests:  None        Social Determinants of Health affecting care:   None    Disposition:  The patient was discharged.     Impression & Plan      Medical Decision Making:  Patient is a 74-year-old female presenting with predominant complaints of left lower extremity swelling.  She is nontoxic though mildly tachycardic on arrival.  Her repeat vitals improved during her time in the ED.  Patient is neurovascularly intact.  No clinical evidence to suggest overlying cellulitis, septic joint, compartment syndrome.  She did undergo formal duplex ultrasound which fortunately is without evidence of DVT though does show complex popliteal fossa cyst as well as complex fluid collection in the left calf likely to represent a hematoma.  Patient denies any known trauma though does note that she bruises easily and was biking a fair amount on her recent trip.  I do not feel emergent x-ray imaging is warranted as I doubt serious traumatic injury.  I did recommend supportive care at this point in time with elevation, ice.  We did discuss consideration of compression dressing to help with swelling.  Ibuprofen/Tylenol as needed and I will provide a few oxycodone for breakthrough pain.  Recommended close PCP follow-up and I also provided orthopedic follow-up as needed.  Return precautions given.    Diagnosis:    ICD-10-CM    1. Synovial cyst of left popliteal space  M71.22       2. Leg hematoma, left, initial encounter  S80.12XA            Discharge Medications:  Discharge Medication List as of 4/14/2024 12:01 AM         START taking these medications    Details   oxyCODONE (ROXICODONE) 5 MG tablet Take 1 tablet (5 mg) by mouth every 6 hours as needed for pain, Disp-6 tablet, R-0, E-Prescribe                4/13/2024   Gill Ames, Gill Viveros,   04/14/24 0054

## 2024-04-26 ENCOUNTER — TELEPHONE (OUTPATIENT)
Dept: GASTROENTEROLOGY | Facility: CLINIC | Age: 74
End: 2024-04-26
Payer: MEDICARE

## 2024-04-26 NOTE — TELEPHONE ENCOUNTER
"Endoscopy Scheduling Screen    Have you had a positive Covid test in the last 14 days?  No    What is your communication preference for Instructions and/or Bowel Prep?   MyChart    What insurance is in the chart?  Other:  medicare/bcbs    Ordering/Referring Provider:     DAMIEN BOOKER      (If ordering provider performs procedure, schedule with ordering provider unless otherwise instructed. )    BMI: Estimated body mass index is 22.35 kg/m  as calculated from the following:    Height as of 4/13/24: 1.727 m (5' 8\").    Weight as of 4/13/24: 66.7 kg (147 lb).     Sedation Ordered  moderate sedation.   If patient BMI > 50 do not schedule in ASC.    If patient BMI > 45 do not schedule at [facility].    Are you taking methadone or Suboxone?  No    Have you had difficulties, pain, or discomfort during past endoscopy procedures?  No    Are you taking any prescription medications for pain 3 or more times per week?   NO, No RN review required.    Do you have a history of malignant hyperthermia?  No    (Females) Are you currently pregnant?   No     Have you been diagnosed or told you have pulmonary hypertension?   No    Do you have an LVAD?  No    Have you been told you have moderate to severe sleep apnea?  No    Have you been told you have COPD, asthma, or any other lung disease?  Yes     What breathing problems do you have?  COPD     Do you use home oxygen?  No    Have your breathing problems required an ED visit or hospitalization in the last year?  No    Do you have any heart conditions?  No     Have you ever had or are you waiting for an organ transplant?  No. Continue scheduling, no site restrictions.    Have you had a stroke or transient ischemic attack (TIA aka \"mini stroke\" in the last 6 months?   No    Have you been diagnosed with or been told you have cirrhosis of the liver?   No    Are you currently on dialysis?   No    Do you need assistance transferring?   No    BMI: Estimated body mass index is 22.35 kg/m  as " "calculated from the following:    Height as of 4/13/24: 1.727 m (5' 8\").    Weight as of 4/13/24: 66.7 kg (147 lb).     Is patients BMI > 40 and scheduling location [facility]?  No    Do you take an injectable medication for weight loss or diabetes (excluding insulin)?  No    Do you take the medication Naltrexone?  No    Do you take blood thinners?  No       Prep   Are you currently on dialysis or do you have chronic kidney disease?  No    Do you have a diagnosis of diabetes?  No    Do you have a diagnosis of cystic fibrosis (CF)?  No    On a regular basis do you go 3 -5 days between bowel movements?  No    BMI > 40?  No    Preferred Pharmacy:    [place], [place] - [address], [address]  [address], [facility] [address]  [facility] [address]  Phone: [phone] Fax: [phone]    [id number] [place] [address] AT [facility] [address]  [facility] [address]  Phone: [phone] Fax: [phone]      Final Scheduling Details     Procedure scheduled  Upper endoscopy (EGD)    Surgeon:  Blu     Date of procedure:  7/9  offered her other dates at all locations sooner, but she was on a vacation everyone we offered her     Pre-OP / PAC:   No - Not required for this site.    Location  [facility] - Patient preference.    Sedation   Moderate Sedation - Per order.      Patient Reminders:   You will receive a call from a Nurse to review instructions and health history.  This assessment must be completed prior to your procedure.  Failure to complete the Nurse assessment may result in the procedure being cancelled.      On the day of your procedure, please designate an adult(s) who can drive you home stay with you for the next 24 hours. The medicines used in the exam will make you sleepy. You will not be able to drive.      You cannot take public transportation, ride share services, or non-medical taxi service without a responsible caregiver.  " Medical transport services are allowed with the requirement that a responsible caregiver will receive you at your destination.  We require that drivers and caregivers are confirmed prior to your procedure.

## 2024-05-22 ENCOUNTER — TELEPHONE (OUTPATIENT)
Dept: GASTROENTEROLOGY | Facility: CLINIC | Age: 74
End: 2024-05-22

## 2024-05-22 NOTE — TELEPHONE ENCOUNTER
Pre assessment completed for upcoming procedure.      Procedure details:    Patient scheduled for Upper endoscopy (EGD) on 05.30.2024.     Arrival time: 1445. Procedure time 1530    Facility location: [facility]; [address] [place] [address]. Check in location: Main entrance at . Come through the roundabout underneath the awning (not the ER entrance).    Sedation type: Conscious sedation     Pre op exam needed? N/A    Indication for procedure:  Dysphagia, unspecified type     Designated  policy reviewed. Instructed to have someone stay 6 hours post procedure.       Chart review:     Electronic implanted devices? No    Recent diagnosis of diverticulitis within the last 6 weeks?  No    Diabetic? No      Medication review:    Anticoagulants? No    NSAIDS? No NSAID medications per patient's medication list.  RN will verify with pre-assessment call.    Other medication HOLDING recommendations:  N/A      Prep for procedure:       Prep instructions sent via [employer]     Reviewed procedure prep instructions.     Patient verbalized understanding and had no questions or concerns at this time.        Shy Hughes RN  Endoscopy Procedure Pre Assessment RN  [phone] option 4

## 2024-05-22 NOTE — TELEPHONE ENCOUNTER
Caller: Paloma    Reason for Reschedule/Cancellation   (please be detailed, any staff messages or encounters to note?): Patient would like to get in sooner.       Prior to reschedule please review:  Ordering Provider:     Dysphagia, unspecified type [R13.10]     Sedation Determined: moderate  Does patient have any ASC Exclusions, please identify?: n      Notes on Cancelled Procedure:  Procedure: Upper Endoscopy [EGD]   Date: 07/09/2024  [facility]; [facility] [place] [address]  Surgeon: Blu      Rescheduled: Yes,   Procedure: Upper Endoscopy [EGD]    Date: 05/30/2024   Location: [facility]; [address] [place] [address]   Surgeon: Blu   Sedation Level Scheduled  moderate ,  Reason for Sedation Level per order   Instructions updated and sent: y     Does patient need PAC or Pre -Op Rescheduled? : no       Did you cancel or rescheduled an EUS procedure? No.

## 2024-05-30 ENCOUNTER — HOSPITAL ENCOUNTER (OUTPATIENT)
Facility: CLINIC | Age: 74
Discharge: HOME | End: 2024-05-30
Attending: INTERNAL MEDICINE | Admitting: INTERNAL MEDICINE
Payer: MEDICARE

## 2024-05-30 VITALS
OXYGEN SATURATION: 98 % | BODY MASS INDEX: 22.28 KG/M2 | DIASTOLIC BLOOD PRESSURE: 78 MMHG | HEIGHT: 68 IN | RESPIRATION RATE: 16 BRPM | HEART RATE: 82 BPM | WEIGHT: 147 LBS | SYSTOLIC BLOOD PRESSURE: 117 MMHG

## 2024-05-30 LAB — UPPER GI ENDOSCOPY: NORMAL

## 2024-05-30 PROCEDURE — 43249 ESOPH EGD DILATION <30 MM: CPT | Performed by: INTERNAL MEDICINE

## 2024-05-30 PROCEDURE — 250N000009 HC RX 250: Performed by: INTERNAL MEDICINE

## 2024-05-30 PROCEDURE — 250N000011 HC RX IP 250 OP 636: Performed by: INTERNAL MEDICINE

## 2024-05-30 PROCEDURE — 999N000099 HC STATISTIC MODERATE SEDATION < 10 MIN: Performed by: INTERNAL MEDICINE

## 2024-05-30 RX ORDER — SIMETHICONE 40MG/0.6ML
133 SUSPENSION, DROPS(FINAL DOSAGE FORM)(ML) ORAL
Status: DISCONTINUED | OUTPATIENT
Start: 2024-05-30 | End: 2024-05-30 | Stop reason: HOSPADM

## 2024-05-30 RX ORDER — ONDANSETRON 2 MG/ML
4 INJECTION INTRAMUSCULAR; INTRAVENOUS
Status: DISCONTINUED | OUTPATIENT
Start: 2024-05-30 | End: 2024-05-30 | Stop reason: HOSPADM

## 2024-05-30 RX ORDER — NALOXONE HYDROCHLORIDE 0.4 MG/ML
0.4 INJECTION, SOLUTION INTRAMUSCULAR; INTRAVENOUS; SUBCUTANEOUS
Status: DISCONTINUED | OUTPATIENT
Start: 2024-05-30 | End: 2024-05-30 | Stop reason: HOSPADM

## 2024-05-30 RX ORDER — ONDANSETRON 2 MG/ML
4 INJECTION INTRAMUSCULAR; INTRAVENOUS EVERY 6 HOURS PRN
Status: CANCELLED | OUTPATIENT
Start: 2024-05-30

## 2024-05-30 RX ORDER — NALOXONE HYDROCHLORIDE 0.4 MG/ML
0.2 INJECTION, SOLUTION INTRAMUSCULAR; INTRAVENOUS; SUBCUTANEOUS
Status: DISCONTINUED | OUTPATIENT
Start: 2024-05-30 | End: 2024-05-30 | Stop reason: HOSPADM

## 2024-05-30 RX ORDER — FLUMAZENIL 0.1 MG/ML
0.2 INJECTION, SOLUTION INTRAVENOUS
Status: DISCONTINUED | OUTPATIENT
Start: 2024-05-30 | End: 2024-05-30 | Stop reason: HOSPADM

## 2024-05-30 RX ORDER — FLUMAZENIL 0.1 MG/ML
0.2 INJECTION, SOLUTION INTRAVENOUS
Status: CANCELLED | OUTPATIENT
Start: 2024-05-30 | End: 2024-05-30

## 2024-05-30 RX ORDER — PROCHLORPERAZINE MALEATE 5 MG
5 TABLET ORAL EVERY 6 HOURS PRN
Status: CANCELLED | OUTPATIENT
Start: 2024-05-30

## 2024-05-30 RX ORDER — ONDANSETRON 4 MG/1
4 TABLET, ORALLY DISINTEGRATING ORAL EVERY 6 HOURS PRN
Status: CANCELLED | OUTPATIENT
Start: 2024-05-30

## 2024-05-30 RX ORDER — EPINEPHRINE 1 MG/ML
0.1 INJECTION, SOLUTION INTRAMUSCULAR; SUBCUTANEOUS
Status: DISCONTINUED | OUTPATIENT
Start: 2024-05-30 | End: 2024-05-30 | Stop reason: HOSPADM

## 2024-05-30 RX ORDER — ATROPINE SULFATE 0.1 MG/ML
1 INJECTION INTRAVENOUS
Status: DISCONTINUED | OUTPATIENT
Start: 2024-05-30 | End: 2024-05-30 | Stop reason: HOSPADM

## 2024-05-30 RX ORDER — FENTANYL CITRATE 50 UG/ML
50-100 INJECTION, SOLUTION INTRAMUSCULAR; INTRAVENOUS EVERY 5 MIN PRN
Status: DISCONTINUED | OUTPATIENT
Start: 2024-05-30 | End: 2024-05-30 | Stop reason: HOSPADM

## 2024-05-30 RX ORDER — LIDOCAINE 40 MG/G
CREAM TOPICAL
Status: DISCONTINUED | OUTPATIENT
Start: 2024-05-30 | End: 2024-05-30 | Stop reason: HOSPADM

## 2024-05-30 RX ORDER — DIPHENHYDRAMINE HYDROCHLORIDE 50 MG/ML
25-50 INJECTION INTRAMUSCULAR; INTRAVENOUS
Status: DISCONTINUED | OUTPATIENT
Start: 2024-05-30 | End: 2024-05-30 | Stop reason: HOSPADM

## 2024-05-30 RX ADMIN — FENTANYL CITRATE 100 MCG: 50 INJECTION, SOLUTION INTRAMUSCULAR; INTRAVENOUS at 09:45

## 2024-05-30 RX ADMIN — MIDAZOLAM 2 MG: 1 INJECTION INTRAMUSCULAR; INTRAVENOUS at 09:45

## 2024-05-30 RX ADMIN — TOPICAL ANESTHETIC 0.5 ML: 200 SPRAY DENTAL; PERIODONTAL at 09:45

## 2024-05-30 ASSESSMENT — ACTIVITIES OF DAILY LIVING (ADL): ADLS_ACUITY_SCORE: 36

## 2024-05-30 NOTE — H&P
Pre-Endoscopy History and Physical     Paloma Fitch MRN# [id number]   YOB: 1950 Age: 74 year old     Date of Procedure: 5/30/2024  Primary care provider: Jonn Downing  Type of Endoscopy: Gastroscopy with possible biopsy, possible dilation  Reason for Procedure: dysphagia  Type of Anesthesia Anticipated: Conscious Sedation    HPI:    Paloma is a 74 year old female who will be undergoing the above procedure.      A history and physical has been performed. The patient's medications and allergies have been reviewed. The risks and benefits of the procedure and the sedation options and risks were discussed with the patient.  All questions were answered and informed consent was obtained.      She denies a personal or family history of anesthesia complications or bleeding disorders.     Patient Active Problem List   Diagnosis    Colonic polyp    SVT (supraventricular tachycardia) (H24)    Intermittent asthma, uncomplicated    Dizziness    Anxiety        Past Medical History:   Diagnosis Date    Anxiety 05/09/2023    added zoloft    Colonic polyp 2012    adenomatous, fu 5 years; fu 9/18 one hyper polyp; fu done 2/24 and nl    Dizziness 05/2020    est echo nl    Gross hematuria 2016    ct and cysto via Dr. Watt    Intermittent asthma     Palpitations 12/2018    echo nl    Screening for osteoporosis 02/2024    nl dexa    SVT (supraventricular tachycardia) (H24) 1994    successful catheter ablation done [facility]        Past Surgical History:   Procedure Laterality Date    CARDIAC SURGERY      ablation for SVT/WPW    COLONOSCOPY N/A 9/20/2018    Procedure: COMBINED COLONOSCOPY, SINGLE OR MULTIPLE BIOPSY/POLYPECTOMY BY BIOPSY;  colonoscopy;  Surgeon: Tre Carreon MD;  Location:  GI    COLONOSCOPY N/A 2/5/2024    Procedure: Colonoscopy;  Surgeon: Raine Dean MD;  Location:  GI    ENT SURGERY      wisdom teeth, mandibular mass excision    wpw ablation surgery         Social  "History     Tobacco Use    Smoking status: Never    Smokeless tobacco: Never   Substance Use Topics    Alcohol use: Yes     Alcohol/week: 7.0 standard drinks of alcohol     Types: 7 Standard drinks or equivalent per week     Comment: one wine a night       Family History   Problem Relation Age of Onset    Colon Cancer Father          age 77    Cerebrovascular Disease Mother         Age 89    Breast Cancer Mother         Age86       Prior to Admission medications    Medication Sig Start Date End Date Taking? Authorizing Provider   EPINEPHrine (ANY BX GENERIC EQUIV) 0.3 MG/0.3ML injection 2-pack INJECT 0.3 MLS(0.3 MG) INTO THE MUSCLE AS NEEDED FOR ANAPHYLAXIS 22   Jonn Downing MD   ketoconazole (NIZORAL) 2 % external cream APPLY TWICE DAILY TO FLARES OF RASH AT ANGLES OF MOUTH 23   Reported, Patient   LORazepam (ATIVAN) 1 MG tablet TAKE 1 TABLET(1 MG) BY MOUTH EVERY 8 HOURS AS NEEDED FOR ANXIETY Strength: 1 mg 10/10/23   Jonn Downing MD   multivitamin w/minerals (THERA-VIT-M) tablet Take 1 tablet by mouth daily    Reported, Patient   sertraline (ZOLOFT) 100 MG tablet TAKE ONE-HALF TABLET BY MOUTH FOR 4 DAYS, THEN INCREASE TO 1 PILL DAILY 24   Jonn Downing MD   valACYclovir (VALTREX) 500 MG tablet TAKE 4 TABLETS BY MOUTH AT FIRST ONSET SIGN OF COLD SORE. THEN TAKE 4 TABLETS 12 HOURS LATER 23   Reported, Patient       Allergies   Allergen Reactions    Iodine-131     Shellfish Allergy Hives        REVIEW OF SYSTEMS:   5 point ROS negative except as noted above in HPI, including Gen., Resp., CV, GI &  system review.    PHYSICAL EXAM:   There were no vitals taken for this visit. Estimated body mass index is 22.35 kg/m  as calculated from the following:    Height as of 24: 1.727 m (5' 8\").    Weight as of 24: 66.7 kg (147 lb).   GENERAL APPEARANCE: alert, and oriented  MENTAL STATUS: alert  AIRWAY EXAM: Mallampatti Class I (visualization of the soft palate, " fauces, uvula, anterior and posterior pillars)  RESP: lungs clear to auscultation - no rales, rhonchi or wheezes  CV: regular rates and rhythm  DIAGNOSTICS:    Not indicated    IMPRESSION   ASA Class 2 - Mild systemic disease    PLAN:   Plan for Gastroscopy with possible biopsy, possible dilation. We discussed the risks, benefits and alternatives and the patient wished to proceed.    The above has been forwarded to the consulting provider.      Signed Electronically by: Rich Hurt MD  May 30, 2024

## 2024-09-21 ENCOUNTER — NURSE TRIAGE (OUTPATIENT)
Dept: NURSING | Facility: CLINIC | Age: 74
End: 2024-09-21
Payer: MEDICARE

## 2025-01-03 ENCOUNTER — HOSPITAL ENCOUNTER (OUTPATIENT)
Dept: MAMMOGRAPHY | Facility: CLINIC | Age: 75
Discharge: HOME | End: 2025-01-03
Attending: INTERNAL MEDICINE | Admitting: INTERNAL MEDICINE
Payer: MEDICARE

## 2025-01-03 DIAGNOSIS — Z12.31 VISIT FOR SCREENING MAMMOGRAM: ICD-10-CM

## 2025-01-03 PROCEDURE — 77063 BREAST TOMOSYNTHESIS BI: CPT

## 2025-02-08 SDOH — HEALTH STABILITY: PHYSICAL HEALTH: ON AVERAGE, HOW MANY MINUTES DO YOU ENGAGE IN EXERCISE AT THIS LEVEL?: 60 MIN

## 2025-02-08 SDOH — HEALTH STABILITY: PHYSICAL HEALTH: ON AVERAGE, HOW MANY DAYS PER WEEK DO YOU ENGAGE IN MODERATE TO STRENUOUS EXERCISE (LIKE A BRISK WALK)?: 5 DAYS

## 2025-02-08 ASSESSMENT — ASTHMA QUESTIONNAIRES
ACT_TOTALSCORE: 25
QUESTION_5 LAST FOUR WEEKS HOW WOULD YOU RATE YOUR ASTHMA CONTROL: COMPLETELY CONTROLLED
QUESTION_1 LAST FOUR WEEKS HOW MUCH OF THE TIME DID YOUR ASTHMA KEEP YOU FROM GETTING AS MUCH DONE AT WORK, SCHOOL OR AT HOME: NONE OF THE TIME
ACT_TOTALSCORE: 25
QUESTION_2 LAST FOUR WEEKS HOW OFTEN HAVE YOU HAD SHORTNESS OF BREATH: NOT AT ALL
QUESTION_3 LAST FOUR WEEKS HOW OFTEN DID YOUR ASTHMA SYMPTOMS (WHEEZING, COUGHING, SHORTNESS OF BREATH, CHEST TIGHTNESS OR PAIN) WAKE YOU UP AT NIGHT OR EARLIER THAN USUAL IN THE MORNING: NOT AT ALL
QUESTION_4 LAST FOUR WEEKS HOW OFTEN HAVE YOU USED YOUR RESCUE INHALER OR NEBULIZER MEDICATION (SUCH AS ALBUTEROL): NOT AT ALL

## 2025-02-08 ASSESSMENT — SOCIAL DETERMINANTS OF HEALTH (SDOH): HOW OFTEN DO YOU GET TOGETHER WITH FRIENDS OR RELATIVES?: MORE THAN THREE TIMES A WEEK

## 2025-02-13 ENCOUNTER — OFFICE VISIT (OUTPATIENT)
Dept: FAMILY MEDICINE | Facility: CLINIC | Age: 75
End: 2025-02-13
Payer: MEDICARE

## 2025-02-13 VITALS
TEMPERATURE: 96.8 F | HEIGHT: 68 IN | RESPIRATION RATE: 16 BRPM | HEART RATE: 84 BPM | OXYGEN SATURATION: 98 % | BODY MASS INDEX: 22.73 KG/M2 | WEIGHT: 150 LBS

## 2025-02-13 DIAGNOSIS — F41.9 ANXIETY: ICD-10-CM

## 2025-02-13 DIAGNOSIS — Z91.030 BEE STING ALLERGY: ICD-10-CM

## 2025-02-13 DIAGNOSIS — Z00.00 ENCOUNTER FOR MEDICARE ANNUAL WELLNESS EXAM: Primary | ICD-10-CM

## 2025-02-13 DIAGNOSIS — K22.2 ESOPHAGEAL STENOSIS: ICD-10-CM

## 2025-02-13 DIAGNOSIS — K63.5 POLYP OF COLON, UNSPECIFIED PART OF COLON, UNSPECIFIED TYPE: ICD-10-CM

## 2025-02-13 PROBLEM — R42 DIZZINESS: Status: RESOLVED | Noted: 2020-05-07 | Resolved: 2025-02-13

## 2025-02-13 LAB
ERYTHROCYTE [DISTWIDTH] IN BLOOD BY AUTOMATED COUNT: 13.2 % (ref 10–15)
HCT VFR BLD AUTO: 41.5 % (ref 35–47)
HGB BLD-MCNC: 13.7 G/DL (ref 11.7–15.7)
MCH RBC QN AUTO: 30.6 PG (ref 26.5–33)
MCHC RBC AUTO-ENTMCNC: 33 G/DL (ref 31.5–36.5)
MCV RBC AUTO: 93 FL (ref 78–100)
PLATELET # BLD AUTO: 394 10E3/UL (ref 150–450)
RBC # BLD AUTO: 4.47 10E6/UL (ref 3.8–5.2)
WBC # BLD AUTO: 8.4 10E3/UL (ref 4–11)

## 2025-02-13 RX ORDER — OMEPRAZOLE 20 MG/1
20 CAPSULE, DELAYED RELEASE ORAL DAILY
Qty: 90 CAPSULE | Refills: 3 | Status: SHIPPED | OUTPATIENT
Start: 2025-02-13

## 2025-02-13 RX ORDER — SERTRALINE HYDROCHLORIDE 100 MG/1
TABLET, FILM COATED ORAL
Qty: 90 TABLET | Refills: 3 | Status: SHIPPED | OUTPATIENT
Start: 2025-02-13

## 2025-02-13 RX ORDER — EPINEPHRINE 0.3 MG/.3ML
0.3 INJECTION SUBCUTANEOUS PRN
Qty: 0.6 ML | Refills: 2 | Status: SHIPPED | OUTPATIENT
Start: 2025-02-13

## 2025-02-13 RX ORDER — OMEPRAZOLE 20 MG/1
CAPSULE, DELAYED RELEASE ORAL
COMMUNITY
Start: 2024-08-25 | End: 2025-02-13

## 2025-02-13 ASSESSMENT — PAIN SCALES - GENERAL: PAINLEVEL_OUTOF10: NO PAIN (0)

## 2025-02-13 NOTE — PROGRESS NOTES
Preventive Care Visit  [facility] DOLORES Downing MD, Internal Medicine  Feb 13, 2025          Klai Dow is a 75 year old, presenting for the following:    She is doing very well and works out.  She does not see gynecology.  Her anxiety is controlled.  Her blood pressure at home is super.  It is always higher here.               Past Medical History:      Past Medical History:   Diagnosis Date    Anxiety 05/09/2023    added zoloft    Colonic polyp 2012    adenomatous, fu 5 years; fu 9/18 one hyper polyp; fu done 2/24 and nl    Dizziness 05/2020    est echo nl    Esophageal stenosis 05/2024    benign appearing, dilated    Gross hematuria 2016    ct and cysto via Dr. Watt    Intermittent asthma     Palpitations 12/2018    echo nl    Screening for osteoporosis 02/2024    nl dexa    SVT (supraventricular tachycardia) 1994    successful catheter ablation done [facility]             Past Surgical History:      Past Surgical History:   Procedure Laterality Date    CARDIAC SURGERY      ablation for SVT/WPW    COLONOSCOPY N/A 9/20/2018    Procedure: COMBINED COLONOSCOPY, SINGLE OR MULTIPLE BIOPSY/POLYPECTOMY BY BIOPSY;  colonoscopy;  Surgeon: Tre Carreon MD;  Location:  GI    COLONOSCOPY N/A 2/5/2024    Procedure: Colonoscopy;  Surgeon: Raine Dean MD;  Location:  GI    ENT SURGERY      wisdom teeth, mandibular mass excision    wpw ablation surgery  41             Social History:     Social History     Socioeconomic History    Marital status:      Spouse name: Not on file    Number of children: 2    Years of education: Not on file    Highest education level: Not on file   Occupational History    Occupation: physical therapist, works admin     Employer: [employer]     Comment: retired   Tobacco Use    Smoking status: Never    Smokeless tobacco: Never   Substance and Sexual Activity    Alcohol use: Yes     Alcohol/week: 7.0 standard drinks of alcohol     Types: 7  Standard drinks or equivalent per week     Comment: one wine a night    Drug use: No    Sexual activity: Yes     Partners: Male   Other Topics Concern    Parent/sibling w/ CABG, MI or angioplasty before 65F 55M? Yes   Social History Narrative    Not on file     Social Drivers of Health     Financial Resource Strain: Low Risk  (2/8/2025)    Financial Resource Strain     Within the past 12 months, have you or your family members you live with been unable to get utilities (heat, electricity) when it was really needed?: No   Food Insecurity: Low Risk  (2/8/2025)    Food Insecurity     Within the past 12 months, did you worry that your food would run out before you got money to buy more?: No     Within the past 12 months, did the food you bought just not last and you didn t have money to get more?: No   Transportation Needs: Low Risk  (2/8/2025)    Transportation Needs     Within the past 12 months, has lack of transportation kept you from medical appointments, getting your medicines, non-medical meetings or appointments, work, or from getting things that you need?: No   Physical Activity: Sufficiently Active (2/8/2025)    Exercise Vital Sign     Days of Exercise per Week: 5 days     Minutes of Exercise per Session: 60 min   Stress: No Stress Concern Present (2/8/2025)    [employer] [employer] - Occupational Stress Questionnaire     Feeling of Stress : Only a little   Social Connections: Unknown (2/8/2025)    Social Connection and Isolation Panel [NHANES]     Frequency of Communication with Friends and Family: Not on file     Frequency of Social Gatherings with Friends and Family: More than three times a week     Attends [employer] Services: Not on file     Active Member of Clubs or Organizations: Not on file     Attends Club or Organization Meetings: Not on file     Marital Status: Not on file   Interpersonal Safety: Low Risk  (2/13/2025)    Interpersonal Safety     Do you feel physically and  "emotionally safe where you currently live?: Yes     Within the past 12 months, have you been hit, slapped, kicked or otherwise physically hurt by someone?: No     Within the past 12 months, have you been humiliated or emotionally abused in other ways by your partner or ex-partner?: No   Housing Stability: Low Risk  (2/8/2025)    Housing Stability     Do you have housing? : Yes     Are you worried about losing your housing?: No             Family History:   reviewed         Allergies:     Allergies   Allergen Reactions    Iodine-131     Shellfish Allergy Hives             Medications:     Current Outpatient Medications   Medication Sig Dispense Refill    EPINEPHrine (ANY BX GENERIC EQUIV) 0.3 MG/0.3ML injection 2-pack Inject 0.3 mLs (0.3 mg) into the muscle as needed for anaphylaxis. May repeat one time in 5-15 minutes if response to initial dose is inadequate. 0.6 mL 2    multivitamin w/minerals (THERA-VIT-M) tablet Take 1 tablet by mouth daily      omeprazole (PRILOSEC) 20 MG DR capsule Take 1 capsule (20 mg) by mouth daily. 90 capsule 3    sertraline (ZOLOFT) 100 MG tablet TAKE ONE-HALF TABLET BY MOUTH FOR 4 DAYS, THEN INCREASE TO 1 PILL DAILY 90 tablet 3    valACYclovir (VALTREX) 500 MG tablet TAKE 4 TABLETS BY MOUTH AT FIRST ONSET SIGN OF COLD SORE. THEN TAKE 4 TABLETS 12 HOURS LATER      LORazepam (ATIVAN) 1 MG tablet TAKE 1 TABLET(1 MG) BY MOUTH EVERY 8 HOURS AS NEEDED FOR ANXIETY Strength: 1 mg 10 tablet 0               Review of Systems:     The 10 point Review of Systems is negative other than noted in the HPI           Physical Exam:   Pulse 84, temperature 96.8  F (36  C), resp. rate 16, height 1.727 m (5' 8\"), weight 68 kg (150 lb), SpO2 98%, not currently breastfeeding.    Exam:  Constitutional: healthy appearing, alert and in no distress  Heent: Normocephalic. Head without obvious masses or lesions. PERRLDC, EOMI. Mouth exam within normal limits: tongue, mucous membranes, posterior pharynx all normal, " no lesions or abnormalities seen.  Tm's and canals within normal limits bilaterally. Neck supple, no nuchal rigidity or masses. No supraclavicular, or cervical adenopathy. Thyroid symmetric, no masses.  Cardiovascular: Regular rate and rhythm, no murmer, rub or gallops.  JVP not elevated, no edema.  Carotids within normal limits bilaterally, no bruits.  Respiratory: Normal respiratory effort.  Lungs clear, normal flow, no wheezing or crackles.  Breasts: Normal bilaterally.  No masses or lesions.  Nipples within normal limits.  No axillary lesions or nodes.  My M.A. Was present during this part of the examination.  Gastrointestinal: Normal active bowel sounds.   Soft, not tender, no masses, guarding or rebound.  No hepatosplenomegaly.   Musculoskeletal: extremities normal, no gross deformities noted.  Skin: no suspicious lesions or rashes   Neurologic: Mental status within normal limits.  Speech fluent.  No gross motor abnormalities and gait intact.  Psychiatric: mentation appears normal and affect normal.         Data:   Labs sent        Assessment:   Normal complete physical exam  Colon polyp, follow-up as noted  Anxiety, good control on Zoloft  Esophageal stenosis, dilated, to take PPI long-term  Bee sting allergy  Healthcare maintenance         Plan:   Up-to-date mammogram and bone density  Up-to-date vaccinations  Letter with labs  Exercise and diet      Jonn Downing M.D.    Appropriate preventive services were discussed with this patient via screening, questionairre, or discussion as appropriate for fall prevention, nutrition, physical activity, social engagement, weight loss and cognition.  Check list reviewing preventive services available has been given to the patient.  Reviewed patient's diet, addressing concerns and questions as appropriate.  Advice given for smoking sensation when appropriate.              Physical          HPI        Health Care Directive  Patient does not have a Health Care Directive:        2/8/2025   General Health   How would you rate your overall physical health? Excellent   Feel stress (tense, anxious, or unable to sleep) Only a little   (!) STRESS CONCERN      2/8/2025   Nutrition   Diet: Regular (no restrictions)         2/8/2025   Exercise   Days per week of moderate/strenous exercise 5 days   Average minutes spent exercising at this level 60 min         2/8/2025   Social Factors   Frequency of gathering with friends or relatives More than three times a week   Worry food won't last until get money to buy more No   Food not last or not have enough money for food? No   Do you have housing? (Housing is defined as stable permanent housing and does not include staying ouside in a car, in a tent, in an abandoned building, in an overnight shelter, or couch-surfing.) Yes   Are you worried about losing your housing? No   Lack of transportation? No   Unable to get utilities (heat,electricity)? No         2/8/2025   Fall Risk   Fallen 2 or more times in the past year? No    No   Trouble with walking or balance? No    No       Multiple values from one day are sorted in reverse-chronological order          2/8/2025   Activities of Daily Living- Home Safety   Needs help with the following daily activites None of the above   Safety concerns in the home None of the above         2/8/2025   Dental   Dentist two times every year? Yes         2/8/2025   Hearing Screening   Hearing concerns? None of the above         2/8/2025   Driving Risk Screening   Patient/family members have concerns about driving No         2/8/2025   General Alertness/Fatigue Screening   Have you been more tired than usual lately? No         2/8/2025   Urinary Incontinence Screening   Bothered by leaking urine in past 6 months Yes         2/3/2024   TB Screening   Were you born outside of the [place]? No               2/8/2025   Substance Use   Alcohol more than 3/day or more than 7/wk No   Do you have a current opioid prescription? No    How severe/bad is pain from 1 to 10? 0/10 (No Pain)   Do you use any other substances recreationally? No     Social History     Tobacco Use    Smoking status: Never    Smokeless tobacco: Never   Substance Use Topics    Alcohol use: Yes     Alcohol/week: 7.0 standard drinks of alcohol     Types: 7 Standard drinks or equivalent per week     Comment: one wine a night    Drug use: No           1/3/2025   LAST FHS-7 RESULTS   1st degree relative breast or ovarian cancer Yes   Any relative bilateral breast cancer No   Any male have breast cancer No   Any ONE woman have BOTH breast AND ovarian cancer No   Any woman with breast cancer before 50yrs No   2 or more relatives with breast AND/OR ovarian cancer No   2 or more relatives with breast AND/OR bowel cancer No            ASCVD Risk   The ASCVD Risk score (Juan A LUCIO, et al., 2019) failed to calculate for the following reasons:    Risk score cannot be calculated because patient has a medical history suggesting prior/existing ASCVD            Reviewed and updated as needed this visit by Provider                      Current providers sharing in care for this patient include:  Patient Care Team:  Jonn Downing MD as PCP - General (Internal Medicine)  Jonn Downing MD as Assigned PCP  Edwardo Ac MD as MD (Allergy & Immunology)  Corin Curtis MD as Assigned Neuroscience Provider    The following health maintenance items are reviewed in Epic and correct as of today:  Health Maintenance   Topic Date Due    ANNUAL REVIEW OF  ORDERS  Never done    ASTHMA ACTION PLAN  02/11/2014    COVID-19 Vaccine (8 - 2024-25 season) 02/28/2025    BMP  04/13/2025    ASTHMA CONTROL TEST  08/13/2025    MEDICARE ANNUAL WELLNESS VISIT  02/13/2026    FALL RISK ASSESSMENT  02/13/2026    DTAP/TDAP/TD IMMUNIZATION (3 - Td or Tdap) 05/03/2026    GLUCOSE  04/13/2027    ADVANCE CARE PLANNING  01/24/2028    COLORECTAL CANCER SCREENING  02/05/2029    LIPID   "02/08/2029    DEXA  02/20/2039    HEPATITIS C SCREENING  Completed    PHQ-2 (once per calendar year)  Completed    INFLUENZA VACCINE  Completed    Pneumococcal Vaccine: 50+ Years  Completed    ZOSTER IMMUNIZATION  Completed    RSV VACCINE  Completed    HPV IMMUNIZATION  Aged Out    MENINGITIS IMMUNIZATION  Aged Out    MAMMO SCREENING  Discontinued            Objective    Exam  Pulse 84   Temp 96.8  F (36  C)   Resp 16   Ht 1.727 m (5' 8\")   Wt 68 kg (150 lb)   SpO2 98%   BMI 22.81 kg/m     Estimated body mass index is 22.81 kg/m  as calculated from the following:    Height as of this encounter: 1.727 m (5' 8\").    Weight as of this encounter: 68 kg (150 lb).    Physical Exam           2/13/2025   Mini Cog   Clock Draw Score 2 Normal   3 Item Recall 3 objects recalled   Mini Cog Total Score 5              Signed Electronically by: Jonn Downing MD    "

## 2025-02-13 NOTE — PATIENT INSTRUCTIONS
Patient Education   Preventive Care Advice   This is general advice given by our system to help you stay healthy. However, your care team may have specific advice just for you. Please talk to your care team about your preventive care needs.  Nutrition  Eat 5 or more servings of fruits and vegetables each day.  Try wheat bread, brown rice and whole grain pasta (instead of white bread, rice, and pasta).  Get enough calcium and vitamin D. Check the label on foods and aim for 100% of the RDA (recommended daily allowance).  Lifestyle  Exercise at least 150 minutes each week  (30 minutes a day, 5 days a week).  Do muscle strengthening activities 2 days a week. These help control your weight and prevent disease.  No smoking.  Wear sunscreen to prevent skin cancer.  Have a dental exam and cleaning every 6 months.  Yearly exams  See your health care team every year to talk about:  Any changes in your health.  Any medicines your care team has prescribed.  Preventive care, family planning, and ways to prevent chronic diseases.  Shots (vaccines)   HPV shots (up to age 26), if you've never had them before.  Hepatitis B shots (up to age 59), if you've never had them before.  COVID-19 shot: Get this shot when it's due.  Flu shot: Get a flu shot every year.  Tetanus shot: Get a tetanus shot every 10 years.  Pneumococcal, hepatitis A, and RSV shots: Ask your care team if you need these based on your risk.  Shingles shot (for age 50 and up)  General health tests  Diabetes screening:  Starting at age 35, Get screened for diabetes at least every 3 years.  If you are younger than age 35, ask your care team if you should be screened for diabetes.  Cholesterol test: At age 39, start having a cholesterol test every 5 years, or more often if advised.  Bone density scan (DEXA): At age 50, ask your care team if you should have this scan for osteoporosis (brittle bones).  Hepatitis C: Get tested at least once in your life.  STIs (sexually  transmitted infections)  Before age 24: Ask your care team if you should be screened for STIs.  After age 24: Get screened for STIs if you're at risk. You are at risk for STIs (including HIV) if:  You are sexually active with more than one person.  You don't use condoms every time.  You or a partner was diagnosed with a sexually transmitted infection.  If you are at risk for HIV, ask about PrEP medicine to prevent HIV.  Get tested for HIV at least once in your life, whether you are at risk for HIV or not.  Cancer screening tests  Cervical cancer screening: If you have a cervix, begin getting regular cervical cancer screening tests starting at age 21.  Breast cancer scan (mammogram): If you've ever had breasts, begin having regular mammograms starting at age 40. This is a scan to check for breast cancer.  Colon cancer screening: It is important to start screening for colon cancer at age 45.  Have a colonoscopy test every 10 years (or more often if you're at risk) Or, ask your provider about stool tests like a FIT test every year or Cologuard test every 3 years.  To learn more about your testing options, visit:   .  For help making a decision, visit:   https://bit.ly/hh62955.  Prostate cancer screening test: If you have a prostate, ask your care team if a prostate cancer screening test (PSA) at age 55 is right for you.  Lung cancer screening: If you are a current or former smoker ages 50 to 80, ask your care team if ongoing lung cancer screenings are right for you.  For informational purposes only. Not to replace the advice of your health care provider. Copyright   2023 [facility] [employer]. All rights reserved. Clinically reviewed by the [facility]itions Program. [employer] [id number] - REV 01/24.  Bladder Training: Care Instructions  Your Care Instructions     Bladder training is used to treat urge incontinence and stress incontinence. Urge incontinence means that the need to urinate comes on so fast  that you can't get to a toilet in time. Stress incontinence means that you leak urine because of pressure on your bladder. For example, it may happen when you laugh, cough, or lift something heavy.  Bladder training can increase how long you can wait before you have to urinate. It can also help your bladder hold more urine. And it can give you better control over the urge to urinate.  It is important to remember that bladder training takes a few weeks to a few months to make a difference. You may not see results right away, but don't give up.  Follow-up care is a key part of your treatment and safety. Be sure to make and go to all appointments, and call your doctor if you are having problems. It's also a good idea to know your test results and keep a list of the medicines you take.  How can you care for yourself at home?  Work with your doctor to come up with a bladder training program that is right for you. You may use one or more of the following methods.  Delayed urination  In the beginning, try to keep from urinating for 5 minutes after you first feel the need to go.  While you wait, take deep, slow breaths to relax. Kegel exercises can also help you delay the need to go to the bathroom.  After some practice, when you can easily wait 5 minutes to urinate, try to wait 10 minutes before you urinate.  Slowly increase the waiting period until you are able to control when you have to urinate.  Scheduled urination  Empty your bladder when you first wake up in the morning.  Schedule times throughout the day when you will urinate.  Start by going to the bathroom every hour, even if you don't need to go.  Slowly increase the time between trips to the bathroom.  When you have found a schedule that works well for you, keep doing it.  If you wake up during the night and have to urinate, do it. Apply your schedule to waking hours only.  Kegel exercises  These tighten and strengthen pelvic muscles, which can help you control  "the flow of urine. (If doing these exercises causes pain, stop doing them and talk with your doctor.) To do Kegel exercises:  Squeeze your muscles as if you were trying not to pass gas. Or squeeze your muscles as if you were stopping the flow of urine. Your belly, legs, and buttocks shouldn't move.  Hold the squeeze for 3 seconds, then relax for 5 to 10 seconds.  Start with 3 seconds, then add 1 second each week until you are able to squeeze for 10 seconds.  Repeat the exercise 10 times a session. Do 3 to 8 sessions a day.  When should you call for help?  Watch closely for changes in your health, and be sure to contact your doctor if:    Your incontinence is getting worse.     You do not get better as expected.   Where can you learn more?  Go to https://[email or web address].net/patiented  Enter V684 in the search box to learn more about \"Bladder Training: Care Instructions.\"  Current as of: April 30, 2024  Content Version: 14.3    2024 [employer].   Care instructions adapted under license by your healthcare professional. If you have questions about a medical condition or this instruction, always ask your healthcare professional. [employer] disclaims any warranty or liability for your use of this information.       "

## 2025-02-22 ENCOUNTER — HEALTH MAINTENANCE LETTER (OUTPATIENT)
Age: 75
End: 2025-02-22

## (undated) DEVICE — KIT ENDO TURNOVER/PROCEDURE W/CLEAN A SCOPE LINERS 103888

## (undated) DEVICE — ENDO BITE BLOCK ADULT OLYMPUS LATEX FREE MAJ-1632

## (undated) DEVICE — CATH BALLOON MERIT ESOPH FIVE-STAGE 11X16MMX180CM EX12

## (undated) DEVICE — INFLATION DEVICE BIG 60 ENDO-AN6012

## (undated) RX ORDER — FENTANYL CITRATE 50 UG/ML
INJECTION, SOLUTION INTRAMUSCULAR; INTRAVENOUS
Status: DISPENSED
Start: 2024-02-05

## (undated) RX ORDER — FENTANYL CITRATE 50 UG/ML
INJECTION, SOLUTION INTRAMUSCULAR; INTRAVENOUS
Status: DISPENSED
Start: 2024-05-30

## (undated) RX ORDER — FENTANYL CITRATE 50 UG/ML
INJECTION, SOLUTION INTRAMUSCULAR; INTRAVENOUS
Status: DISPENSED
Start: 2018-09-20